# Patient Record
Sex: MALE | Race: BLACK OR AFRICAN AMERICAN | Employment: UNEMPLOYED | URBAN - METROPOLITAN AREA
[De-identification: names, ages, dates, MRNs, and addresses within clinical notes are randomized per-mention and may not be internally consistent; named-entity substitution may affect disease eponyms.]

---

## 2020-04-23 ENCOUNTER — APPOINTMENT (OUTPATIENT)
Dept: CT IMAGING | Age: 50
DRG: 045 | End: 2020-04-23
Attending: EMERGENCY MEDICINE
Payer: MEDICAID

## 2020-04-23 ENCOUNTER — HOSPITAL ENCOUNTER (INPATIENT)
Age: 50
LOS: 10 days | Discharge: HOME HEALTH | DRG: 045 | End: 2020-05-04
Attending: EMERGENCY MEDICINE | Admitting: INTERNAL MEDICINE
Payer: MEDICAID

## 2020-04-23 DIAGNOSIS — I63.339 CEREBROVASCULAR ACCIDENT (CVA) DUE TO THROMBOSIS OF POSTERIOR CEREBRAL ARTERY, UNSPECIFIED BLOOD VESSEL LATERALITY (HCC): ICD-10-CM

## 2020-04-23 DIAGNOSIS — R42 VERTIGO: Primary | ICD-10-CM

## 2020-04-23 DIAGNOSIS — R47.81 SLURRED SPEECH: ICD-10-CM

## 2020-04-23 DIAGNOSIS — F10.10 ALCOHOL ABUSE: ICD-10-CM

## 2020-04-23 DIAGNOSIS — Q21.12 PFO (PATENT FORAMEN OVALE): ICD-10-CM

## 2020-04-23 DIAGNOSIS — Z86.73 HISTORY OF TIAS: ICD-10-CM

## 2020-04-23 DIAGNOSIS — E78.00 HYPERCHOLESTEREMIA: ICD-10-CM

## 2020-04-23 DIAGNOSIS — R27.0 ATAXIA: ICD-10-CM

## 2020-04-23 DIAGNOSIS — M48.02 CERVICAL SPINAL STENOSIS: ICD-10-CM

## 2020-04-23 DIAGNOSIS — R29.898 BILATERAL LEG WEAKNESS: ICD-10-CM

## 2020-04-23 DIAGNOSIS — I65.23 BILATERAL CAROTID ARTERY STENOSIS: ICD-10-CM

## 2020-04-23 LAB
ALBUMIN SERPL-MCNC: 4.1 G/DL (ref 3.5–5)
ALBUMIN/GLOB SERPL: 1 {RATIO} (ref 1.1–2.2)
ALP SERPL-CCNC: 87 U/L (ref 45–117)
ALT SERPL-CCNC: 28 U/L (ref 12–78)
ANION GAP SERPL CALC-SCNC: 7 MMOL/L (ref 5–15)
AST SERPL-CCNC: 29 U/L (ref 15–37)
BASOPHILS # BLD: 0.1 K/UL (ref 0–0.1)
BASOPHILS NFR BLD: 2 % (ref 0–1)
BILIRUB SERPL-MCNC: 0.2 MG/DL (ref 0.2–1)
BUN SERPL-MCNC: 7 MG/DL (ref 6–20)
BUN/CREAT SERPL: 7 (ref 12–20)
CALCIUM SERPL-MCNC: 8.2 MG/DL (ref 8.5–10.1)
CHLORIDE SERPL-SCNC: 101 MMOL/L (ref 97–108)
CO2 SERPL-SCNC: 30 MMOL/L (ref 21–32)
CREAT SERPL-MCNC: 0.94 MG/DL (ref 0.7–1.3)
DIFFERENTIAL METHOD BLD: ABNORMAL
EOSINOPHIL # BLD: 0 K/UL (ref 0–0.4)
EOSINOPHIL NFR BLD: 0 % (ref 0–7)
ERYTHROCYTE [DISTWIDTH] IN BLOOD BY AUTOMATED COUNT: 14.2 % (ref 11.5–14.5)
ETHANOL SERPL-MCNC: 208 MG/DL
GLOBULIN SER CALC-MCNC: 4.1 G/DL (ref 2–4)
GLUCOSE BLD STRIP.AUTO-MCNC: 86 MG/DL (ref 65–100)
GLUCOSE SERPL-MCNC: 94 MG/DL (ref 65–100)
HCT VFR BLD AUTO: 47.1 % (ref 36.6–50.3)
HGB BLD-MCNC: 15.5 G/DL (ref 12.1–17)
IMM GRANULOCYTES # BLD AUTO: 0 K/UL (ref 0–0.04)
IMM GRANULOCYTES NFR BLD AUTO: 0 % (ref 0–0.5)
INR PPP: 1 (ref 0.9–1.1)
LYMPHOCYTES # BLD: 1.2 K/UL (ref 0.8–3.5)
LYMPHOCYTES NFR BLD: 31 % (ref 12–49)
MCH RBC QN AUTO: 30.6 PG (ref 26–34)
MCHC RBC AUTO-ENTMCNC: 32.9 G/DL (ref 30–36.5)
MCV RBC AUTO: 93.1 FL (ref 80–99)
MONOCYTES # BLD: 0.2 K/UL (ref 0–1)
MONOCYTES NFR BLD: 6 % (ref 5–13)
NEUTS SEG # BLD: 2.2 K/UL (ref 1.8–8)
NEUTS SEG NFR BLD: 61 % (ref 32–75)
NRBC # BLD: 0 K/UL (ref 0–0.01)
NRBC BLD-RTO: 0 PER 100 WBC
PLATELET # BLD AUTO: 268 K/UL (ref 150–400)
PMV BLD AUTO: 9.4 FL (ref 8.9–12.9)
POTASSIUM SERPL-SCNC: 4.2 MMOL/L (ref 3.5–5.1)
PROT SERPL-MCNC: 8.2 G/DL (ref 6.4–8.2)
PROTHROMBIN TIME: 10.2 SEC (ref 9–11.1)
RBC # BLD AUTO: 5.06 M/UL (ref 4.1–5.7)
SERVICE CMNT-IMP: NORMAL
SODIUM SERPL-SCNC: 138 MMOL/L (ref 136–145)
TROPONIN I SERPL-MCNC: <0.05 NG/ML
WBC # BLD AUTO: 3.7 K/UL (ref 4.1–11.1)

## 2020-04-23 PROCEDURE — 74011250636 HC RX REV CODE- 250/636: Performed by: EMERGENCY MEDICINE

## 2020-04-23 PROCEDURE — 74011000250 HC RX REV CODE- 250: Performed by: EMERGENCY MEDICINE

## 2020-04-23 PROCEDURE — 80307 DRUG TEST PRSMV CHEM ANLYZR: CPT

## 2020-04-23 PROCEDURE — 36415 COLL VENOUS BLD VENIPUNCTURE: CPT

## 2020-04-23 PROCEDURE — 80053 COMPREHEN METABOLIC PANEL: CPT

## 2020-04-23 PROCEDURE — 82962 GLUCOSE BLOOD TEST: CPT

## 2020-04-23 PROCEDURE — 85025 COMPLETE CBC W/AUTO DIFF WBC: CPT

## 2020-04-23 PROCEDURE — 70496 CT ANGIOGRAPHY HEAD: CPT

## 2020-04-23 PROCEDURE — 99285 EMERGENCY DEPT VISIT HI MDM: CPT

## 2020-04-23 PROCEDURE — 74011636320 HC RX REV CODE- 636/320: Performed by: EMERGENCY MEDICINE

## 2020-04-23 PROCEDURE — 99218 HC RM OBSERVATION: CPT

## 2020-04-23 PROCEDURE — 0042T CT CODE NEURO PERF W CBF: CPT

## 2020-04-23 PROCEDURE — 96374 THER/PROPH/DIAG INJ IV PUSH: CPT

## 2020-04-23 PROCEDURE — 70450 CT HEAD/BRAIN W/O DYE: CPT

## 2020-04-23 PROCEDURE — 85610 PROTHROMBIN TIME: CPT

## 2020-04-23 PROCEDURE — 84484 ASSAY OF TROPONIN QUANT: CPT

## 2020-04-23 PROCEDURE — 93005 ELECTROCARDIOGRAM TRACING: CPT

## 2020-04-23 RX ORDER — HEPARIN SODIUM 5000 [USP'U]/ML
5000 INJECTION, SOLUTION INTRAVENOUS; SUBCUTANEOUS EVERY 8 HOURS
Status: DISCONTINUED | OUTPATIENT
Start: 2020-04-23 | End: 2020-05-04 | Stop reason: HOSPADM

## 2020-04-23 RX ORDER — ACETAMINOPHEN 325 MG/1
650 TABLET ORAL
Status: DISCONTINUED | OUTPATIENT
Start: 2020-04-23 | End: 2020-04-24

## 2020-04-23 RX ORDER — DIAZEPAM 5 MG/1
10 TABLET ORAL ONCE
Status: ACTIVE | OUTPATIENT
Start: 2020-04-23 | End: 2020-04-24

## 2020-04-23 RX ORDER — DIAZEPAM 5 MG/1
10 TABLET ORAL
Status: DISCONTINUED | OUTPATIENT
Start: 2020-04-23 | End: 2020-04-24

## 2020-04-23 RX ORDER — ASPIRIN 325 MG/1
100 TABLET, FILM COATED ORAL DAILY
Status: DISCONTINUED | OUTPATIENT
Start: 2020-04-23 | End: 2020-04-24

## 2020-04-23 RX ORDER — ACETAMINOPHEN 325 MG/1
650 TABLET ORAL
Status: DISCONTINUED | OUTPATIENT
Start: 2020-04-23 | End: 2020-05-04 | Stop reason: HOSPADM

## 2020-04-23 RX ORDER — LABETALOL HYDROCHLORIDE 5 MG/ML
5 INJECTION, SOLUTION INTRAVENOUS
Status: DISCONTINUED | OUTPATIENT
Start: 2020-04-23 | End: 2020-04-28

## 2020-04-23 RX ORDER — DIAZEPAM 5 MG/1
20 TABLET ORAL ONCE
Status: DISCONTINUED | OUTPATIENT
Start: 2020-04-23 | End: 2020-04-23

## 2020-04-23 RX ORDER — ONDANSETRON 2 MG/ML
4 INJECTION INTRAMUSCULAR; INTRAVENOUS
Status: DISCONTINUED | OUTPATIENT
Start: 2020-04-23 | End: 2020-04-28

## 2020-04-23 RX ORDER — SODIUM CHLORIDE 9 MG/ML
125 INJECTION, SOLUTION INTRAVENOUS CONTINUOUS
Status: DISCONTINUED | OUTPATIENT
Start: 2020-04-23 | End: 2020-04-23

## 2020-04-23 RX ORDER — GUAIFENESIN 100 MG/5ML
81 LIQUID (ML) ORAL DAILY
Status: DISCONTINUED | OUTPATIENT
Start: 2020-04-24 | End: 2020-04-25

## 2020-04-23 RX ORDER — SODIUM CHLORIDE 0.9 % (FLUSH) 0.9 %
10 SYRINGE (ML) INJECTION
Status: COMPLETED | OUTPATIENT
Start: 2020-04-23 | End: 2020-04-23

## 2020-04-23 RX ORDER — ACETAMINOPHEN 650 MG/1
650 SUPPOSITORY RECTAL
Status: DISCONTINUED | OUTPATIENT
Start: 2020-04-23 | End: 2020-04-24

## 2020-04-23 RX ORDER — SODIUM CHLORIDE 9 MG/ML
75 INJECTION, SOLUTION INTRAVENOUS CONTINUOUS
Status: DISCONTINUED | OUTPATIENT
Start: 2020-04-23 | End: 2020-04-23

## 2020-04-23 RX ORDER — DIAZEPAM 5 MG/1
20 TABLET ORAL
Status: DISCONTINUED | OUTPATIENT
Start: 2020-04-23 | End: 2020-04-24

## 2020-04-23 RX ADMIN — Medication 10 ML: at 13:09

## 2020-04-23 RX ADMIN — IOPAMIDOL 100 ML: 755 INJECTION, SOLUTION INTRAVENOUS at 13:09

## 2020-04-23 RX ADMIN — FOLIC ACID: 5 INJECTION, SOLUTION INTRAMUSCULAR; INTRAVENOUS; SUBCUTANEOUS at 16:16

## 2020-04-23 NOTE — ED NOTES
TRANSFER - OUT REPORT:    Verbal report given to Adriana3(name) on Lillie Snell  being transferred to neuro(unit) for routine progression of care       Report consisted of patients Situation, Background, Assessment and   Recommendations(SBAR). Information from the following report(s) SBAR and ED Summary was reviewed with the receiving nurse. Lines:   Peripheral IV 04/23/20 Right Antecubital (Active)   Site Assessment Clean, dry, & intact 4/23/2020 12:59 PM   Phlebitis Assessment 0 4/23/2020 12:59 PM   Infiltration Assessment 0 4/23/2020 12:59 PM        Opportunity for questions and clarification was provided.       Patient transported with:   transport

## 2020-04-23 NOTE — ED PROVIDER NOTES
EMERGENCY DEPARTMENT HISTORY AND PHYSICAL EXAM      Date: 4/23/2020  Patient Name: Marielena Crabtree  Patient Age and Sex: 47 y.o. male     History of Presenting Illness     Chief Complaint   Patient presents with    Altered mental status     , patient states previous hx stroke in Jan 2020    Headache    Alcohol Problem     patient states has been drinking for few days    Fatigue     bilateral general weakness, moving all extremities       History Provided By: Patient, ems    HPI: Marielena Crabtree is a 53-year-old male presenting for headache and dizziness. Patient states that he does have a history of high blood pressure and a history of stroke back in January. Yesterday went to bed without any symptoms and then woke up this morning and felt dizzy like the room was spinning as well as unstable. Also states that he feels like his legs bilaterally are weak. When asked if he has been drinking, patient said to me that his last drink was about 3 days ago. Patient also states that he has been having a headache. EMS reported a high blood pressure in route but a normal glucose. When they did the [place] scale, it was negative. Patient did tell nurse that that he has been drinking for the past couple of days and yesterday had a 40 ounce beer. His last drink was yesterday. There are no other complaints, changes, or physical findings at this time. PCP: None    No current facility-administered medications on file prior to encounter. No current outpatient medications on file prior to encounter. Past History     Past Medical History:  No past medical history on file. Past Surgical History:  No past surgical history on file. Family History:  No family history on file.     Social History:  Social History     Tobacco Use    Smoking status: Never Smoker   Substance Use Topics    Alcohol use: Yes     Comment: social drinker    Drug use: No       Allergies:  No Known Allergies      Review of Systems   Review of Systems   Constitutional: Negative for chills and fever. Respiratory: Negative for cough and shortness of breath. Cardiovascular: Negative for chest pain. Gastrointestinal: Negative for abdominal pain, constipation, diarrhea, nausea and vomiting. Genitourinary: Negative for dysuria, frequency and hematuria. Neurological: Positive for dizziness, weakness and headaches. Negative for numbness. All other systems reviewed and are negative. Physical Exam   Physical Exam  Vitals signs and nursing note reviewed. Constitutional:       Appearance: He is well-developed. HENT:      Head: Normocephalic and atraumatic. Eyes:      Conjunctiva/sclera: Conjunctivae normal.   Neck:      Musculoskeletal: Normal range of motion and neck supple. Cardiovascular:      Rate and Rhythm: Normal rate and regular rhythm. Pulmonary:      Effort: Pulmonary effort is normal. No respiratory distress. Breath sounds: Normal breath sounds. Abdominal:      General: There is no distension. Palpations: Abdomen is soft. Tenderness: There is no abdominal tenderness. Musculoskeletal: Normal range of motion. Skin:     General: Skin is warm and dry. Neurological:      General: No focal deficit present. Mental Status: He is alert and oriented to person, place, and time. Mental status is at baseline. Comments: Patient is alert and oriented. No cranial nerve deficits. 5-5 strength in the upper extremities, no pronator drift of the arms. With his legs, has 4-5 strength in his bilateral lower extremities with slightly worsened on the right.   No tremors noted   Psychiatric:         Mood and Affect: Mood normal.          Diagnostic Study Results     Labs -     Recent Results (from the past 12 hour(s))   CBC WITH AUTOMATED DIFF    Collection Time: 04/23/20  1:26 PM   Result Value Ref Range    WBC 3.7 (L) 4.1 - 11.1 K/uL    RBC 5.06 4.10 - 5.70 M/uL    HGB 15.5 12.1 - 17.0 g/dL HCT 47.1 36.6 - 50.3 %    MCV 93.1 80.0 - 99.0 FL    MCH 30.6 26.0 - 34.0 PG    MCHC 32.9 30.0 - 36.5 g/dL    RDW 14.2 11.5 - 14.5 %    PLATELET 726 553 - 558 K/uL    MPV 9.4 8.9 - 12.9 FL    NRBC 0.0 0  WBC    ABSOLUTE NRBC 0.00 0.00 - 0.01 K/uL    NEUTROPHILS 61 32 - 75 %    LYMPHOCYTES 31 12 - 49 %    MONOCYTES 6 5 - 13 %    EOSINOPHILS 0 0 - 7 %    BASOPHILS 2 (H) 0 - 1 %    IMMATURE GRANULOCYTES 0 0.0 - 0.5 %    ABS. NEUTROPHILS 2.2 1.8 - 8.0 K/UL    ABS. LYMPHOCYTES 1.2 0.8 - 3.5 K/UL    ABS. MONOCYTES 0.2 0.0 - 1.0 K/UL    ABS. EOSINOPHILS 0.0 0.0 - 0.4 K/UL    ABS. BASOPHILS 0.1 0.0 - 0.1 K/UL    ABS. IMM. GRANS. 0.0 0.00 - 0.04 K/UL    DF AUTOMATED     METABOLIC PANEL, COMPREHENSIVE    Collection Time: 04/23/20  1:26 PM   Result Value Ref Range    Sodium 138 136 - 145 mmol/L    Potassium 4.2 3.5 - 5.1 mmol/L    Chloride 101 97 - 108 mmol/L    CO2 30 21 - 32 mmol/L    Anion gap 7 5 - 15 mmol/L    Glucose 94 65 - 100 mg/dL    BUN 7 6 - 20 MG/DL    Creatinine 0.94 0.70 - 1.30 MG/DL    BUN/Creatinine ratio 7 (L) 12 - 20      GFR est AA >60 >60 ml/min/1.73m2    GFR est non-AA >60 >60 ml/min/1.73m2    Calcium 8.2 (L) 8.5 - 10.1 MG/DL    Bilirubin, total 0.2 0.2 - 1.0 MG/DL    ALT (SGPT) 28 12 - 78 U/L    AST (SGOT) 29 15 - 37 U/L    Alk. phosphatase 87 45 - 117 U/L    Protein, total 8.2 6.4 - 8.2 g/dL    Albumin 4.1 3.5 - 5.0 g/dL    Globulin 4.1 (H) 2.0 - 4.0 g/dL    A-G Ratio 1.0 (L) 1.1 - 2.2     TROPONIN I    Collection Time: 04/23/20  1:26 PM   Result Value Ref Range    Troponin-I, Qt. <0.05 <0.05 ng/mL   ETHYL ALCOHOL    Collection Time: 04/23/20  1:26 PM   Result Value Ref Range    ALCOHOL(ETHYL),SERUM 208 (H) <10 MG/DL   GLUCOSE, POC    Collection Time: 04/23/20  1:34 PM   Result Value Ref Range    Glucose (POC) 86 65 - 100 mg/dL    Performed by Amber Ware        Radiologic Studies -   CTA CODE NEURO HEAD AND NECK W CONT   Final Result   Impression:      1. No large vessel occlusion.  No significant vascular disease. 2.  . Right frontal developmental venous anomaly. 3.  Incidental findings as above      CT CODE NEURO PERF W CBF   Final Result   Impression:      1. No large vessel occlusion. No significant vascular disease. 2.  . Right frontal developmental venous anomaly. 3.  Incidental findings as above      CT CODE NEURO HEAD WO CONTRAST   Final Result   IMPRESSION:      No evidence for acute intracranial abnormality         Findings were discussed with the ED        CT Results  (Last 48 hours)               04/23/20 1308  CTA CODE NEURO HEAD AND NECK W CONT Final result    Impression:  Impression:       1. No large vessel occlusion. No significant vascular disease. 2.  . Right frontal developmental venous anomaly. 3.  Incidental findings as above       Narrative:  INDICATION: Code Stroke       EXAMINATION:  CT ANGIOGRAPHY HEAD AND NECK        COMPARISON: None        TECHNIQUE:  Following the uneventful administration of iodinated contrast   material, axial CT angiography of the head and neck was performed. Delayed axial   images through the head were also obtained. Coronal and sagittal reconstructions   were obtained. Manual postprocessing of images was performed. 3-D  Sagittal   maximal intensity projection images were obtained. 3-D Coronal maximal   intensity projections were obtained. CT dose reduction was achieved through use   of a standardized protocol tailored for this examination and automatic exposure   control for dose modulation. CT brain perfusion was performed with generation of hemodynamic maps of multiple   parameters, including cerebral blood flow, cerebral blood volume, and MTT (mean   transit time). FINDINGS:       CTA NECK:       Aortic Arch: No significant abnormality. Right Common Carotid Artery: No significant abnormality. Right Internal Carotid Artery: No significant abnormality. NASCET Right: 0-25%.    Left Common Carotid Artery: No significant abnormality. Left Internal Carotid Artery: No significant abnormality. NASCET Left: 0-25%. .   Carotid stenosis determined using NASCET criteria. Right Vertebral Artery: No significant abnormality. Left Vertebral Artery: No significant abnormality. Cervical Soft Tissues: Mucosal thickening in the bilateral maxillary sinuses. .   Lung Apices: No significant abnormality. Bones: Degenerative changes in cervical spine. .   Additional Comments: N/A.       CTA HEAD:       Posterior Circulation: Bilateral posterior communicating arteries are noted. Sivakumar Larios Anterior Circulation: No flow limiting stenosis or occlusion. Additional Comments: Incidental note is made of a probable developmental venous   anomaly in the right frontal lobe. .       There are no regional areas of elevated Tmax, decreased cerebral blood flow or   blood volume. rCBF < 30% = 0 cc. Tmax > 6 seconds = 0 cc.           04/23/20 1308  CT CODE NEURO PERF W CBF Final result    Impression:  Impression:       1. No large vessel occlusion. No significant vascular disease. 2.  . Right frontal developmental venous anomaly. 3.  Incidental findings as above       Narrative:  INDICATION: Code Stroke       EXAMINATION:  CT ANGIOGRAPHY HEAD AND NECK        COMPARISON: None        TECHNIQUE:  Following the uneventful administration of iodinated contrast   material, axial CT angiography of the head and neck was performed. Delayed axial   images through the head were also obtained. Coronal and sagittal reconstructions   were obtained. Manual postprocessing of images was performed. 3-D  Sagittal   maximal intensity projection images were obtained. 3-D Coronal maximal   intensity projections were obtained. CT dose reduction was achieved through use   of a standardized protocol tailored for this examination and automatic exposure   control for dose modulation.        CT brain perfusion was performed with generation of hemodynamic maps of multiple   parameters, including cerebral blood flow, cerebral blood volume, and MTT (mean   transit time). FINDINGS:       CTA NECK:       Aortic Arch: No significant abnormality. Right Common Carotid Artery: No significant abnormality. Right Internal Carotid Artery: No significant abnormality. NASCET Right: 0-25%. Left Common Carotid Artery: No significant abnormality. Left Internal Carotid Artery: No significant abnormality. NASCET Left: 0-25%. .   Carotid stenosis determined using NASCET criteria. Right Vertebral Artery: No significant abnormality. Left Vertebral Artery: No significant abnormality. Cervical Soft Tissues: Mucosal thickening in the bilateral maxillary sinuses. .   Lung Apices: No significant abnormality. Bones: Degenerative changes in cervical spine. .   Additional Comments: N/A.       CTA HEAD:       Posterior Circulation: Bilateral posterior communicating arteries are noted. Cherl Spinner Anterior Circulation: No flow limiting stenosis or occlusion. Additional Comments: Incidental note is made of a probable developmental venous   anomaly in the right frontal lobe. .       There are no regional areas of elevated Tmax, decreased cerebral blood flow or   blood volume. rCBF < 30% = 0 cc. Tmax > 6 seconds = 0 cc.           04/23/20 1254  CT CODE NEURO HEAD WO CONTRAST Final result    Impression:  IMPRESSION:       No evidence for acute intracranial abnormality           Findings were discussed with the ED       Narrative:  INDICATION:   Generalized weakness, fatigue        EXAM:  HEAD CT WITHOUT CONTRAST       COMPARISON:  None       TECHNIQUE:  Routine noncontrast axial head CT was performed. Coronal and   sagittal multiplanar reconstructions were obtained. CT dose reduction was   achieved through use of a standardized protocol tailored for this examination   and automatic exposure control for dose modulation.         FINDINGS:       The ventricles and cortical sulci are within normal limits. No evidence for acute intracranial hemorrhage, midline shift, or mass effect. Mild bilateral subcortical and periventricular areas of patchy low attenuation   is nonspecific but likely related to changes of chronic small vessel ischemic   disease. The basal cisterns are patent. Mild mucosal thickening in the maxillary sinuses       No calvarial abnormality. CXR Results  (Last 48 hours)    None            Medical Decision Making   I am the first provider for this patient. I reviewed the vital signs, available nursing notes, past medical history, past surgical history, family history and social history. Vital Signs-Reviewed the patient's vital signs. Patient Vitals for the past 12 hrs:   Temp Pulse Resp BP SpO2   04/23/20 1320 98.4 °F (36.9 °C) 94 19 (!) 144/110 100 %       Records Reviewed: Nursing Notes and Old Medical Records    Provider Notes (Medical Decision Making):   Patient with a history of headache and stroke in the past presenting for dizziness described as room spinning and vertigo. Given his risk factors, will do CT and CTA to look for any signs of posterior stroke. Will get labs and alcohol level on him as well to make sure no electrolyte abnormalities or signs of intoxication causing the symptoms. Differential could also be due to malnourishment, Warnicke's encephalopathy, B vitamin deficiency or folic acid deficiency. ED Course:   Initial assessment performed. The patients presenting problems have been discussed, and they are in agreement with the care plan formulated and outlined with them. I have encouraged them to ask questions as they arise throughout their visit. ED Course as of Apr 23 1422   Thu Apr 23, 2020   1256 Received call from Dr. Maria Esther Agosto, Tele-Neurology, who agrees with getting CT and CTA on patient and she will call back with the results and will see patient.     [JS]   [id number] with Dr. Maria Esther Agosto who states that given his history of recent stroke this could potentially be a small stroke versus alcohol withdrawal.  Would recommend admission and agrees with banana bag. [JS]   0872 CIWA score 4    [JS]      ED Course User Index  [JS] Garrett Haddad MD     Critical Care Time:   0    Disposition:    Admission Note:  Patient is being admitted to the hospital by Dr. Shari Shah, Service: Hospitalist.  The results of their tests and reasons for their admission have been discussed with them and available family. They convey agreement and understanding for the need to be admitted and for their admission diagnosis. Diagnosis     Clinical Impression:   1. Vertigo    2. Slurred speech    3. Alcohol abuse        Attestations:    Curry Llamas M.D. Please note that this dictation was completed with [employer], the computer voice recognition software. Quite often unanticipated grammatical, syntax, homophones, and other interpretive errors are inadvertently transcribed by the computer software. Please disregard these errors. Please excuse any errors that have escaped final proofreading. Thank you.

## 2020-04-23 NOTE — H&P
Hospitalist Admission Note    NAME:  So Carmen   :   1970   MRN:   [id number]     Date of admit: 2020    PCP: None    Assessment/Plan:     Possible stroke versus peripheral vertigo POA  Ataxia and vertigo x1 day POA  Reports history of stroke in [place] 2020 with similar symptoms  Denies taking aspirin or blood pressure medicines at present time  1 day of feeling like he is staggering when he walks(ETOH 208), intermittent vertigo  Bilateral leg weakness, but no right or left-sided weakness, no visual changes or speech changes  Emergency room head CT scan and CTA negative  Tele neurology recommended admission for MRI and evaluation  Telemetry to rule out atrial fibrillation  Permissive hypertension, PRN labetalol  Start aspirin  Start Lipitor  Check MRI of the brain check   Echocardiogram  Neurology consult  Dysphagia evaluation before diet, speech consult  PT and OT consults    Essential hypertension POA  Not currently on therapy, elevated at admission 144/110  PRN  labetalol    Alcohol abuse POA  Patient states he is not \"not heavy\", said last drink 3 days ago  Alcohol level 208 in ED, then admitted to drinking a 40 ounce beer  P.o. thiamine  CIWA scale    Right frontal developmental venous anomaly noted on CTA  Unclear significance, suspect nothing to do  Neurology input    Homelessness  As case management for shelter referrals    Very poor dentition  Multiple caries and missing teeth, discussed with patient getting dental care     Body mass index is 23.79 kg/m². Given the patient's current clinical presentation, I have a high level of concern for decompensation if discharged from the emergency department. My assessment of this patient's clinical condition and my plan of care is as noted above.     DVT prophylaxis with heparin SQ    Code status: Full code  NOK: brother Tona Bucio    History     CHIEF COMPLAINT: I felt like I was staggering today when I walked    HISTORY OF PRESENT ILLNESS:    70-year-old -American male    Originally from [place], currently is homeless    Says he had a stroke in [facility] in January 2020  Presented with similar symptoms to today  Says he is not currently taking an aspirin or any prescription medications    Awoke this morning suddenly felt unsteady when he walked \"like I was staggering\"  Initially denied drinking but had an alcohol level of 208 in the emergency room  No headache  No speech changes or visual changes  No focal weakness but did admit to bilateral leg weakness  No sensory changes  No neck or back pain  No fevers chills, cough, shortness of breath, chest pain, nausea vomiting, diarrhea, abdominal pain    Called EMS because of the unsteady gait    Emergency room neuro exam nonfocal  Hemodynamically stable  Head CT scan negative  CTA head and neck with no large vessel occlusion and no significant vascular disease   Right frontal developmental venous anomaly  Tele neurology consulted, recommended admission for MRI and further evaluation  We will call    Past medical history:  1. Possible stroke January 2020 in [facility]  2. Essential hypertension  3.   Alcohol use      Social History     Tobacco Use    Smoking status: Never Smoker   Substance Use Topics    Alcohol use: Yes     Comment: social drinker   Says \"not a heavy drinker\" not daily initially said last drink was 3 days ago  We discussed alcohol level he corrected that he did have a 40 ounce beer the night before admission  Currently homeless    Family history:  No children of own  No family history of stroke    No Known Allergies     Prior to Admission medications       Takes no prescription medications    Review of symptoms:     POSITIVE= Bold  Negative = not bold  General:  fever, chills, sweats,  Eyes:    blurred vision, eye pain, double vision  ENT:    Coryza, sore throat, trouble swallowing  Respiratory:   cough, sputum, SOB  Cardiology:   chest pain, orthopnea, PND, edema  Gastrointestinal:  abdominal pain , N/V, diarrhea, constipation, melena or BRBPR  Genitourinary:  Urgency, dysuria, hematuria  Muskuloskeletal :  Joint redness, swelling or acute joint pain, myalgias  Hematology:  easy bruising, nose or gum bleeding  Dermatological: rash, ulceration  Endocrine:   Polyuria or polydipsia, heat or hold intolerance  Neurological:  Headache, bilateral leg weakness     Speech difficulties, memory loss, unsteady gait     Vertigo  Psychological: depression, agitation      Objective:   VITALS:    Patient Vitals for the past 24 hrs:   Temp Pulse Resp BP SpO2   20 2352 98.7 °F (37.1 °C) 73 16 115/83 97 %   20 2019 98.7 °F (37.1 °C) 83 16 157/90 96 %   20 1855 99.3 °F (37.4 °C) 97 16 167/82 96 %   20 1745  (!) 103 14 (!) 148/93 94 %   20 1320 98.4 °F (36.9 °C) 94 19 (!) 144/110 100 %     Temp (24hrs), Av.4 °F (36.9 °C), Min:98.4 °F (36.9 °C), Max:98.4 °F (36.9 °C)      O2 Device: Room air    Wt Readings from Last 12 Encounters:   20 77.4 kg (170 lb 9 oz)   05/13/10 98.9 kg (218 lb 0.6 oz)         PHYSICAL EXAM:   General:    Alert, cooperative in no distress     HEENT: Normocephalic, atraumatic    PERRL,  Sclera no icterus    Nasal mucosa without masses or discharge  Hearing intact to voice    Oropharynx without erythema or exudate  Very poor dentition with missing teeth and caries  Neck:  No meningismus, trachea midline, no carotid bruits     Thyroid not enlarged, no nodules or tenderness  Lungs:   Clear to auscultation bilaterally. No wheezing or rales    No accessory muscle use or retractions. Heart:   Regular rate and rhythm,  no murmur or gallop. No LE edema  Abdomen:   Soft, non-tender. Not distended. Bowel sounds normal.     No masses, No Hepatosplenomegaly, No Rebound or guarding  Lymph nodes: No cervical or inguinal UMANG  Musculoskeletal:  No Joint swelling, erythema, warmth.  No Cyanosis or clubbing  Skin:      No rashes     Not Jaundiced   No nodules or thickening  Neurologic: Alert and oriented X 3, follows commands     Cranial nerves 2 to 12 intact, smile symmetric    Symmetric motor strength bilaterally, no pronator drift    F to N without dysmetria       LAB DATA REVIEWED:    Recent Results (from the past 12 hour(s))   CBC WITH AUTOMATED DIFF    Collection Time: 04/23/20  1:26 PM   Result Value Ref Range    WBC 3.7 (L) 4.1 - 11.1 K/uL    RBC 5.06 4.10 - 5.70 M/uL    HGB 15.5 12.1 - 17.0 g/dL    HCT 47.1 36.6 - 50.3 %    MCV 93.1 80.0 - 99.0 FL    MCH 30.6 26.0 - 34.0 PG    MCHC 32.9 30.0 - 36.5 g/dL    RDW 14.2 11.5 - 14.5 %    PLATELET 629 841 - 971 K/uL    MPV 9.4 8.9 - 12.9 FL    NRBC 0.0 0  WBC    ABSOLUTE NRBC 0.00 0.00 - 0.01 K/uL    NEUTROPHILS 61 32 - 75 %    LYMPHOCYTES 31 12 - 49 %    MONOCYTES 6 5 - 13 %    EOSINOPHILS 0 0 - 7 %    BASOPHILS 2 (H) 0 - 1 %    IMMATURE GRANULOCYTES 0 0.0 - 0.5 %    ABS. NEUTROPHILS 2.2 1.8 - 8.0 K/UL    ABS. LYMPHOCYTES 1.2 0.8 - 3.5 K/UL    ABS. MONOCYTES 0.2 0.0 - 1.0 K/UL    ABS. EOSINOPHILS 0.0 0.0 - 0.4 K/UL    ABS. BASOPHILS 0.1 0.0 - 0.1 K/UL    ABS. IMM. GRANS. 0.0 0.00 - 0.04 K/UL    DF AUTOMATED     METABOLIC PANEL, COMPREHENSIVE    Collection Time: 04/23/20  1:26 PM   Result Value Ref Range    Sodium 138 136 - 145 mmol/L    Potassium 4.2 3.5 - 5.1 mmol/L    Chloride 101 97 - 108 mmol/L    CO2 30 21 - 32 mmol/L    Anion gap 7 5 - 15 mmol/L    Glucose 94 65 - 100 mg/dL    BUN 7 6 - 20 MG/DL    Creatinine 0.94 0.70 - 1.30 MG/DL    BUN/Creatinine ratio 7 (L) 12 - 20      GFR est AA >60 >60 ml/min/1.73m2    GFR est non-AA >60 >60 ml/min/1.73m2    Calcium 8.2 (L) 8.5 - 10.1 MG/DL    Bilirubin, total 0.2 0.2 - 1.0 MG/DL    ALT (SGPT) 28 12 - 78 U/L    AST (SGOT) 29 15 - 37 U/L    Alk.  phosphatase 87 45 - 117 U/L    Protein, total 8.2 6.4 - 8.2 g/dL    Albumin 4.1 3.5 - 5.0 g/dL    Globulin 4.1 (H) 2.0 - 4.0 g/dL    A-G Ratio 1.0 (L) 1.1 - 2.2     TROPONIN I Collection Time: 04/23/20  1:26 PM   Result Value Ref Range    Troponin-I, Qt. <0.05 <0.05 ng/mL   ETHYL ALCOHOL    Collection Time: 04/23/20  1:26 PM   Result Value Ref Range    ALCOHOL(ETHYL),SERUM 208 (H) <10 MG/DL   GLUCOSE, POC    Collection Time: 04/23/20  1:34 PM   Result Value Ref Range    Glucose (POC) 86 65 - 100 mg/dL    Performed by Marcelo Gregg        EKG as read by me shows normal sinus rhythm rate 90 normal axis, no hypertrophy,   no ST-T wave changes, normal intervals    CT scan head read by radiology FINDINGS:  The ventricles and cortical sulci are within normal limits. No evidence for acute intracranial hemorrhage, midline shift, or mass effect. Mild bilateral subcortical and periventricular areas of patchy low attenuation  is nonspecific but likely related to changes of chronic small vessel ischemic  disease. The basal cisterns are patent. Mild mucosal thickening in the maxillary sinuses  No calvarial abnormality. IMPRESSION:  No evidence for acute intracranial abnormality    CTA head and neck read by radiology FINDINGS:  CTA NECK:  Aortic Arch: No significant abnormality. Right Common Carotid Artery: No significant abnormality. Right Internal Carotid Artery: No significant abnormality. NASCET Right: 0-25%. Left Common Carotid Artery: No significant abnormality. Left Internal Carotid Artery: No significant abnormality. NASCET Left: 0-25%. .  Carotid stenosis determined using NASCET criteria. Right Vertebral Artery: No significant abnormality. Left Vertebral Artery: No significant abnormality. Cervical Soft Tissues: Mucosal thickening in the bilateral maxillary sinuses. .  Lung Apices: No significant abnormality. Bones: Degenerative changes in cervical spine. .  Additional Comments: N/A.  CTA HEAD:  Posterior Circulation: Bilateral posterior communicating arteries are noted. Blanchie Bevels Anterior Circulation: No flow limiting stenosis or occlusion.   Additional Comments: Incidental note is made of a probable developmental venous  anomaly in the right frontal lobe. .  There are no regional areas of elevated Tmax, decreased cerebral blood flow or  blood volume. rCBF < 30% = 0 cc. Tmax > 6 seconds = 0 cc. Impression:  1. No large vessel occlusion. No significant vascular disease.     2. . Right frontal developmental venous anomaly.     3. Incidental findings as above      I saw the patient personally, took a history and did a complete physical exam at the bedside. I performed complex decision making in coming up with a diagnostic and treatment plan for the patient. I reviewed the patient's past medical records, current laboratory and radiology results, and actual Xray films/EKG. I have also discussed this case with the involved ED physician.     Care Plan discussed with:    Patient,  ED Doc    Risk of deterioration:  High    Total Time Coordinating Admission: 60    minutes    Total Critical Care Time:         Joe Ag MD

## 2020-04-23 NOTE — PROGRESS NOTES
Please fill out MRI screening sheet and fax to MRI department once completed and signed.  Augustine Gabriel 12

## 2020-04-23 NOTE — PROGRESS NOTES
Nurse Clarification of the Prior to Admission Medication Regimen     The patient was interviewed regarding clarification of the prior to admission medication regimen. The individual(s) listed above were questioned regarding the patient's use of any other inhalers, topical products, over the counter medications, herbal medications, vitamin products or ophthalmic/nasal/otic medication use. Information Obtained From: Patient    Recommendations/Findings:    The following amendments were made to the patient's active medication list on file at [facility]:     1) Additions:    None    2) Removals:    None    3) Changes:   None       PTA medication list was corrected to the following:     None        Thank you,  Pa Chaudhry RN

## 2020-04-24 ENCOUNTER — APPOINTMENT (OUTPATIENT)
Dept: MRI IMAGING | Age: 50
DRG: 045 | End: 2020-04-24
Attending: INTERNAL MEDICINE
Payer: MEDICAID

## 2020-04-24 ENCOUNTER — APPOINTMENT (OUTPATIENT)
Dept: NON INVASIVE DIAGNOSTICS | Age: 50
DRG: 045 | End: 2020-04-24
Attending: INTERNAL MEDICINE
Payer: MEDICAID

## 2020-04-24 ENCOUNTER — APPOINTMENT (OUTPATIENT)
Dept: VASCULAR SURGERY | Age: 50
DRG: 045 | End: 2020-04-24
Attending: PSYCHIATRY & NEUROLOGY
Payer: MEDICAID

## 2020-04-24 PROBLEM — E78.00 HYPERCHOLESTEREMIA: Status: ACTIVE | Noted: 2020-04-24

## 2020-04-24 PROBLEM — F10.10 ALCOHOL ABUSE: Status: ACTIVE | Noted: 2020-04-24

## 2020-04-24 PROBLEM — R27.0 ATAXIA: Status: ACTIVE | Noted: 2020-04-24

## 2020-04-24 PROBLEM — I10 HTN (HYPERTENSION): Status: ACTIVE | Noted: 2020-04-24

## 2020-04-24 PROBLEM — Z86.73 HX-TIA (TRANSIENT ISCHEMIC ATTACK): Status: ACTIVE | Noted: 2020-04-24

## 2020-04-24 PROBLEM — I65.23 BILATERAL CAROTID ARTERY STENOSIS: Status: ACTIVE | Noted: 2020-04-24

## 2020-04-24 LAB
25(OH)D3 SERPL-MCNC: <9 NG/ML (ref 30–100)
APPEARANCE UR: CLEAR
ATRIAL RATE: 90 BPM
BACTERIA URNS QL MICRO: ABNORMAL /HPF
BILIRUB UR QL CFM: NEGATIVE
CALCULATED P AXIS, ECG09: 53 DEGREES
CALCULATED R AXIS, ECG10: 66 DEGREES
CALCULATED T AXIS, ECG11: 73 DEGREES
CHOLEST SERPL-MCNC: 204 MG/DL
CK SERPL-CCNC: 140 U/L (ref 39–308)
COLOR UR: ABNORMAL
DIAGNOSIS, 93000: NORMAL
ECHO AO ROOT DIAM: 3.14 CM
ECHO LV E' LATERAL VELOCITY: 14.72 CM/S
ECHO LV E' SEPTAL VELOCITY: 10.96 CM/S
ECHO RA AREA 4C: 17.43 CM2
EPITH CASTS URNS QL MICRO: ABNORMAL /LPF
ERYTHROCYTE [SEDIMENTATION RATE] IN BLOOD: 3 MM/HR (ref 0–20)
EST. AVERAGE GLUCOSE BLD GHB EST-MCNC: 120 MG/DL
FOLATE SERPL-MCNC: 15.1 NG/ML (ref 5–21)
GLUCOSE UR STRIP.AUTO-MCNC: NEGATIVE MG/DL
HBA1C MFR BLD: 5.8 % (ref 4–5.6)
HDLC SERPL-MCNC: 109 MG/DL
HDLC SERPL: 1.9 {RATIO} (ref 0–5)
HEMOCCULT STL QL: NEGATIVE
HGB UR QL STRIP: NEGATIVE
HYALINE CASTS URNS QL MICRO: ABNORMAL /LPF (ref 0–5)
KETONES UR QL STRIP.AUTO: ABNORMAL MG/DL
LDLC SERPL CALC-MCNC: 83.4 MG/DL (ref 0–100)
LEFT CCA DIST DIAS: 25.6 CM/S
LEFT CCA DIST SYS: 90.4 CM/S
LEFT CCA PROX DIAS: 25.6 CM/S
LEFT CCA PROX SYS: 131.8 CM/S
LEFT ECA DIAS: 15.3 CM/S
LEFT ECA SYS: 61.9 CM/S
LEFT ICA DIST DIAS: 22.4 CM/S
LEFT ICA DIST SYS: 69.8 CM/S
LEFT ICA MID DIAS: 25.6 CM/S
LEFT ICA MID SYS: 69.7 CM/S
LEFT ICA PROX DIAS: 15.3 CM/S
LEFT ICA PROX SYS: 82.6 CM/S
LEFT ICA/CCA SYS: 0.9
LEFT SUBCLAVIAN DIAS: 20.5 CM/S
LEFT SUBCLAVIAN SYS: 186.2 CM/S
LEFT VERTEBRAL DIAS: 12.7 CM/S
LEFT VERTEBRAL SYS: 51.5 CM/S
LEUKOCYTE ESTERASE UR QL STRIP.AUTO: NEGATIVE
LIPID PROFILE,FLP: ABNORMAL
NITRITE UR QL STRIP.AUTO: NEGATIVE
P-R INTERVAL, ECG05: 144 MS
PH UR STRIP: 8 [PH] (ref 5–8)
PROT UR STRIP-MCNC: 30 MG/DL
Q-T INTERVAL, ECG07: 376 MS
QRS DURATION, ECG06: 98 MS
QTC CALCULATION (BEZET), ECG08: 459 MS
RBC #/AREA URNS HPF: ABNORMAL /HPF (ref 0–5)
RIGHT CCA DIST DIAS: 21.7 CM/S
RIGHT CCA DIST SYS: 87.6 CM/S
RIGHT CCA PROX DIAS: 10.8 CM/S
RIGHT CCA PROX SYS: 81 CM/S
RIGHT ECA DIAS: 15.1 CM/S
RIGHT ECA SYS: 85.4 CM/S
RIGHT ICA DIST DIAS: 17.3 CM/S
RIGHT ICA DIST SYS: 57.4 CM/S
RIGHT ICA MID DIAS: 12.9 CM/S
RIGHT ICA MID SYS: 59 CM/S
RIGHT ICA PROX DIAS: 12.9 CM/S
RIGHT ICA PROX SYS: 43.7 CM/S
RIGHT ICA/CCA SYS: 0.7
RIGHT SUBCLAVIAN DIAS: 0 CM/S
RIGHT SUBCLAVIAN SYS: 113.9 CM/S
RIGHT VERTEBRAL DIAS: 10.8 CM/S
RIGHT VERTEBRAL SYS: 43.7 CM/S
SP GR UR REFRACTOMETRY: 1.03 (ref 1–1.03)
TRIGL SERPL-MCNC: 58 MG/DL (ref ?–150)
TSH SERPL DL<=0.05 MIU/L-ACNC: 3.69 UIU/ML (ref 0.36–3.74)
UROBILINOGEN UR QL STRIP.AUTO: 1 EU/DL (ref 0.2–1)
VENTRICULAR RATE, ECG03: 90 BPM
VIT B12 SERPL-MCNC: 568 PG/ML (ref 193–986)
VLDLC SERPL CALC-MCNC: 11.6 MG/DL
WBC URNS QL MICRO: ABNORMAL /HPF (ref 0–4)

## 2020-04-24 PROCEDURE — 74011250637 HC RX REV CODE- 250/637: Performed by: INTERNAL MEDICINE

## 2020-04-24 PROCEDURE — 93880 EXTRACRANIAL BILAT STUDY: CPT

## 2020-04-24 PROCEDURE — 82550 ASSAY OF CK (CPK): CPT

## 2020-04-24 PROCEDURE — 97116 GAIT TRAINING THERAPY: CPT

## 2020-04-24 PROCEDURE — 85652 RBC SED RATE AUTOMATED: CPT

## 2020-04-24 PROCEDURE — 65660000000 HC RM CCU STEPDOWN

## 2020-04-24 PROCEDURE — 82784 ASSAY IGA/IGD/IGG/IGM EACH: CPT

## 2020-04-24 PROCEDURE — 97161 PT EVAL LOW COMPLEX 20 MIN: CPT

## 2020-04-24 PROCEDURE — 70551 MRI BRAIN STEM W/O DYE: CPT

## 2020-04-24 PROCEDURE — 99218 HC RM OBSERVATION: CPT

## 2020-04-24 PROCEDURE — 82746 ASSAY OF FOLIC ACID SERUM: CPT

## 2020-04-24 PROCEDURE — 87086 URINE CULTURE/COLONY COUNT: CPT

## 2020-04-24 PROCEDURE — 97165 OT EVAL LOW COMPLEX 30 MIN: CPT

## 2020-04-24 PROCEDURE — 82607 VITAMIN B-12: CPT

## 2020-04-24 PROCEDURE — 83036 HEMOGLOBIN GLYCOSYLATED A1C: CPT

## 2020-04-24 PROCEDURE — 97535 SELF CARE MNGMENT TRAINING: CPT

## 2020-04-24 PROCEDURE — 82306 VITAMIN D 25 HYDROXY: CPT

## 2020-04-24 PROCEDURE — 97530 THERAPEUTIC ACTIVITIES: CPT

## 2020-04-24 PROCEDURE — 93306 TTE W/DOPPLER COMPLETE: CPT

## 2020-04-24 PROCEDURE — 81001 URINALYSIS AUTO W/SCOPE: CPT

## 2020-04-24 PROCEDURE — 80061 LIPID PANEL: CPT

## 2020-04-24 PROCEDURE — 84443 ASSAY THYROID STIM HORMONE: CPT

## 2020-04-24 PROCEDURE — 82272 OCCULT BLD FECES 1-3 TESTS: CPT

## 2020-04-24 PROCEDURE — 72141 MRI NECK SPINE W/O DYE: CPT

## 2020-04-24 PROCEDURE — 92610 EVALUATE SWALLOWING FUNCTION: CPT

## 2020-04-24 PROCEDURE — 36415 COLL VENOUS BLD VENIPUNCTURE: CPT

## 2020-04-24 PROCEDURE — 86038 ANTINUCLEAR ANTIBODIES: CPT

## 2020-04-24 PROCEDURE — 74011250636 HC RX REV CODE- 250/636: Performed by: INTERNAL MEDICINE

## 2020-04-24 PROCEDURE — 80307 DRUG TEST PRSMV CHEM ANLYZR: CPT

## 2020-04-24 PROCEDURE — 96372 THER/PROPH/DIAG INJ SC/IM: CPT

## 2020-04-24 RX ORDER — METOPROLOL TARTRATE 25 MG/1
12.5 TABLET, FILM COATED ORAL EVERY 12 HOURS
Status: DISCONTINUED | OUTPATIENT
Start: 2020-04-24 | End: 2020-05-04 | Stop reason: HOSPADM

## 2020-04-24 RX ORDER — ATORVASTATIN CALCIUM 40 MG/1
40 TABLET, FILM COATED ORAL
Status: DISCONTINUED | OUTPATIENT
Start: 2020-04-24 | End: 2020-05-04 | Stop reason: HOSPADM

## 2020-04-24 RX ORDER — DIAZEPAM 5 MG/1
5 TABLET ORAL
Status: DISCONTINUED | OUTPATIENT
Start: 2020-04-24 | End: 2020-05-04 | Stop reason: HOSPADM

## 2020-04-24 RX ORDER — THERA TABS 400 MCG
1 TAB ORAL DAILY
Status: DISCONTINUED | OUTPATIENT
Start: 2020-04-24 | End: 2020-05-04 | Stop reason: HOSPADM

## 2020-04-24 RX ORDER — DIAZEPAM 5 MG/1
2.5 TABLET ORAL
Status: DISCONTINUED | OUTPATIENT
Start: 2020-04-24 | End: 2020-05-04 | Stop reason: HOSPADM

## 2020-04-24 RX ORDER — FOLIC ACID 1 MG/1
1 TABLET ORAL DAILY
Status: DISCONTINUED | OUTPATIENT
Start: 2020-04-24 | End: 2020-05-04 | Stop reason: HOSPADM

## 2020-04-24 RX ORDER — ASPIRIN 325 MG/1
100 TABLET, FILM COATED ORAL DAILY
Status: DISCONTINUED | OUTPATIENT
Start: 2020-04-24 | End: 2020-05-04 | Stop reason: HOSPADM

## 2020-04-24 RX ADMIN — METOPROLOL TARTRATE 12.5 MG: 25 TABLET, FILM COATED ORAL at 21:00

## 2020-04-24 RX ADMIN — HEPARIN SODIUM 5000 UNITS: 5000 INJECTION INTRAVENOUS; SUBCUTANEOUS at 21:00

## 2020-04-24 RX ADMIN — HEPARIN SODIUM 5000 UNITS: 5000 INJECTION INTRAVENOUS; SUBCUTANEOUS at 00:02

## 2020-04-24 RX ADMIN — HEPARIN SODIUM 5000 UNITS: 5000 INJECTION INTRAVENOUS; SUBCUTANEOUS at 17:25

## 2020-04-24 RX ADMIN — FOLIC ACID 1 MG: 1 TABLET ORAL at 09:47

## 2020-04-24 RX ADMIN — HEPARIN SODIUM 5000 UNITS: 5000 INJECTION INTRAVENOUS; SUBCUTANEOUS at 08:30

## 2020-04-24 RX ADMIN — ATORVASTATIN CALCIUM 40 MG: 40 TABLET, FILM COATED ORAL at 21:00

## 2020-04-24 RX ADMIN — THERA TABS 1 TABLET: TAB at 09:47

## 2020-04-24 RX ADMIN — ASPIRIN 81 MG 81 MG: 81 TABLET ORAL at 08:29

## 2020-04-24 RX ADMIN — METOPROLOL TARTRATE 12.5 MG: 25 TABLET, FILM COATED ORAL at 09:46

## 2020-04-24 RX ADMIN — THIAMINE HCL TAB 100 MG 100 MG: 100 TAB at 09:46

## 2020-04-24 NOTE — PROGRESS NOTES
Problem: Self Care Deficits Care Plan (Adult)  Goal: *Acute Goals and Plan of Care (Insert Text)  Description: FUNCTIONAL STATUS PRIOR TO ADMISSION: Patient was independent and active without use of DME. Pt reports being previously independent in ADLs, does not own any equipment, and has few resources. HOME SUPPORT: Patient is homeless and reports no local supports available. Pt has no equipment    Occupational Therapy Goals  Initiated 4/24/2020   1. Patient will perform standing BUE grooming with modified independence within 7 day(s). 2.  Patient will perform toilet transfers with modified independence within 7 day(s). 3.  Patient will perform all aspects of toileting with independence within 7 day(s). 4.  Patient will participate in upper extremity therapeutic exercise/activities with independence for 10 minutes within 7 day(s). 5.  Patient will utilize energy conservation techniques during functional activities with verbal cues within 7 day(s). Outcome: Progressing Towards Goal     OCCUPATIONAL THERAPY EVALUATION  Patient: Marichuy Tavares (18 y.o. male)  Date: 4/24/2020  Primary Diagnosis: Vertigo [R42]  Vertigo [R42]        Precautions: Seizure    ASSESSMENT  Based on the objective data described below, the patient presents with generalized weakness (proximal > distal UEs and Les), impaired balance, B hand tremor, and slight dysmetria noted during UE coordination testing. Seated EOB, pt able to adjust B socks with increased time and difficulty, CGA for balance. Pt upper body strength is sufficient for brief upper body ADLs, but generally decreased  with LUE being slightly weaker than RUE. Pt able to complete bathroom mobility and stand at sink to brush his teeth with CGA and RW for stability. Pt requiring verbal cues for appropriate use of RW during mobility and for safe hand placements during transfer.  No anticipated OT needs at discharge, but agree that pt would benefit from PT pending pt disposition and available resources. Current Level of Function Impacting Discharge (ADLs/self-care): supervision for bed mobility; CGA for functional mobility and transfers with RW; generally unsteady but no overt LOB    Functional Outcome Measure: The patient scored Total A-D  Total A-D (Motor Function): 57/66 on the Fugl-Perez Assessment which is indicative of mild impairment in upper extremity functional status. Other factors to consider for discharge: homeless, disposition unknown     Patient will benefit from skilled therapy intervention to address the above noted impairments. PLAN :  Recommendations and Planned Interventions: functional mobility training, therapeutic exercise, balance training, endurance activities, neuromuscular re-education, patient education, and home safety training    Frequency/Duration: Patient will be followed by occupational therapy 4 times a week to address goals. Recommendation for discharge: (in order for the patient to meet his/her long term goals)  To be determined: Pt is currently homeless     This discharge recommendation:  Has not yet been discussed the attending provider and/or case management    IF patient discharges home will need the following DME: TBD       SUBJECTIVE:   Patient stated my hands are shaking.     OBJECTIVE DATA SUMMARY:   HISTORY:   Past Medical History:   Diagnosis Date    Alcohol abuse     Bilateral leg weakness     Developmental venous anomaly     History of TIAs    History reviewed. No pertinent surgical history.   Expanded or extensive additional review of patient history:     Home Situation  Home Environment: Other (comment)(homeless)  One/Two Story Residence: Other (Comment)(homeless)  Living Alone: Yes  Support Systems: None(pt unable to verbalize local supports)  Patient Expects to be Discharged to[de-identified] Shelter  Current DME Used/Available at Home: None    Hand dominance: Right    EXAMINATION OF PERFORMANCE DEFICITS:  Cognitive/Behavioral Status:  Neurologic State: Alert; Appropriate for age  Orientation Level: Oriented X4  Cognition: Follows commands; Appropriate for age attention/concentration; Appropriate decision making  Perception: Appears intact  Perseveration: No perseveration noted  Safety/Judgement: Awareness of environment; Fall prevention; Insight into deficits    Hearing: Auditory  Auditory Impairment: None    Vision/Perceptual:    Acuity: Within Defined Limits    Corrective Lenses: Glasses    Range of Motion:  AROM: Generally decreased, functional  PROM: Generally decreased, functional    Strength:  Strength: Generally decreased, functional(grossly 3+/5 except Df 4/5)     Coordination:  Coordination: Generally decreased, functional  Fine Motor Skills-Upper: Left Impaired;Right Impaired    Gross Motor Skills-Upper: Left Intact; Right Intact    Balance:  Sitting: Intact  Standing: Impaired  Standing - Static: Constant support; Fair  Standing - Dynamic : Constant support; Fair    Functional Mobility and Transfers for ADLs:  Bed Mobility:  Rolling: Independent  Supine to Sit: Supervision  Scooting: Supervision    Transfers:  Sit to Stand: Contact guard assistance (cues for hand placement on stable surface)  Stand to Sit: Contact guard assistance (cues for hand placement on stable surface)  Bed to Chair: Contact guard assistance  Bathroom Mobility: Contact guard assistance  Toilet Transfer : Contact guard assistance    ADL Assessment:  Feeding: Setup;Stand-by assistance (2/2 fine motor deficits)    Oral Facial Hygiene/Grooming: Setup;Contact guard assistance (standing at sink)    Upper Body Dressing: Stand-by assistance    Lower Body Dressing: Contact guard assistance; Additional time (seated EOB)    Toileting: Contact guard assistance    ADL Intervention and task modifications:   Cognitive Retraining  Safety/Judgement: Awareness of environment; Fall prevention; Insight into deficits    Functional Measure:  Fugl-Perez Assessment of Motor Recovery after Stroke:   Reflex Activity  Flexors/Biceps/Fingers: Can be elicited  Extensors/Triceps: Can be elicited  Reflex Subtotal: 4    Volitional Movement Within Synergies  Shoulder Retraction: Partial  Shoulder Elevation: Partial  Shoulder Abduction (90 degrees): Partial  Shoulder External Rotation: Full  Elbow Flexion: Full  Forearm Supination: Full  Shoulder Adduction/Internal Rotation: Full  Elbow Extension: Full  Forearm Pronation: Full  Subtotal: 15    Volitional Movement Mixing Synergies  Hand to Lumbar Spine: Full  Shoulder Flexion (0-90 degrees): Full  Pronation-Supination: Full  Subtotal: 6    Volitional Movement With Little or No Synergy  Shoulder Abduction (0-90 degrees): Partial  Shoulder Flexion ( degrees): Partial  Pronation/Supination: Full  Subtotal : 4    Normal Reflex Activity  Biceps, Triceps, Finger Flexors: Full  Subtotal : 2    Upper Extremity Total   Upper Extremity Total: 31    Wrist  Stability at 15 Degree Dorsiflexion: Full  Repeated Dorsiflexion/ Volar Flexion: Full  Stability at 15 Degree Dorsiflexion: Full  Repeated Dorsiflexion/ Volar Flexion: Full  Circumduction: Full  Wrist Total: 10    Hand  Mass Flexion: Full  Mass Extension: Full  Grasp A: Partial  Grasp B: Full  Grasp C: Full  Grasp D: Full  Grasp E: Full  Hand Total: 13    Coordination/Speed  Tremor: Slight  Dysmetria: Slight  Time: 2-5s  Coordination/Speed Total : 3    Total A-D  Total A-D (Motor Function): 57/66     This is a reliable/valid measure of arm function after a neurological event. It has established value to characterize functional status and for measuring spontaneous and therapy-induced recovery; tests proximal and distal motor functions. Fugl-Perez Assessment  UE scores recorded between five and 30 days post neurologic event can be used to predict UE recovery at six months post neurologic event.   Severe = 0-21 points   Moderately Severe = 22-33 points   Moderate = 34-47 points Mild = 48-66 points  KATI Rob, HAYLEY Heard, & MITUL Munoz (1992). Measurement of motor recovery after stroke: Outcome assessment and sample size requirements. Stroke, 23, pp. 4205-9949. Occupational Therapy Evaluation Charge Determination   History Examination Decision-Making   LOW Complexity : Brief history review  LOW Complexity : 1-3 performance deficits relating to physical, cognitive , or psychosocial skils that result in activity limitations and / or participation restrictions  LOW Complexity : No comorbidities that affect functional and no verbal or physical assistance needed to complete eval tasks       Based on the above components, the patient evaluation is determined to be of the following complexity level: LOW   Pain Rating:  Pt not reporting pain this session. Activity Tolerance:   Good  Please refer to the flowsheet for vital signs taken during this treatment. After treatment patient left in no apparent distress:    Sitting in chair, Call bell within reach, and Bed / chair alarm activated    COMMUNICATION/EDUCATION:   The patients plan of care was discussed with: Physical therapist and Registered nurse. Home safety education was provided and the patient/caregiver indicated understanding., Patient/family have participated as able in goal setting and plan of care. , and Patient/family agree to work toward stated goals and plan of care.     Thank you for this referral.  Finesse Pandey OT  Time Calculation: 29 mins

## 2020-04-24 NOTE — PROGRESS NOTES
SPEECH PATHOLOGY BEDSIDE SWALLOW EVALUATION/DISCHARGE  Patient: Clyde Vallejo (46 y.o. male)  Date: 4/24/2020  Primary Diagnosis: Vertigo [R42]       Precautions:        ASSESSMENT :  Based on the objective data described below, the patient presents with no oral or pharyngeal dysphagia and no overt s/s aspiration observed. Skilled acute therapy provided by a speech-language pathologist is not indicated at this time. PLAN :  Recommendations:  -Continue regular/thin liquid diet, straws ok, meds as tolerated  Discharge Recommendations: None     SUBJECTIVE:   Patient denied decline in swallowing, speech, language, or cognition. Oriented x4. OBJECTIVE:   No past medical history on file. No past surgical history on file. Prior Level of Function/Home Situation:   Home Situation  Home Environment: Other (comment)(homeless)  One/Two Story Residence: Other (Comment)(homeless)  Living Alone: Yes  Support Systems: Family member(s)  Patient Expects to be Discharged to[de-identified] Shelter  Current DME Used/Available at Home: None  Diet prior to admission: regular/thin  Current Diet:  Regular/thin   Cognitive and Communication Status:  Neurologic State: Alert, Appropriate for age  Orientation Level: Oriented X4  Cognition: Follows commands  Perception: Appears intact  Perseveration: No perseveration noted  Safety/Judgement: Awareness of environment  Oral Assessment:  Oral Assessment  Labial: No impairment  Dentition: Natural  Oral Hygiene: moist oral mucosa  Lingual: No impairment  Velum: No impairment  Mandible: No impairment  P.O. Trials:  Patient Position: upright in bed  Vocal quality prior to P.O.: No impairment  Consistency Presented: Thin liquid; Solid  How Presented: Self-fed/presented;Cup/sip;Straw;Successive swallows     Bolus Acceptance: No impairment  Bolus Formation/Control: No impairment     Propulsion: No impairment  Oral Residue: None  Initiation of Swallow: No impairment  Laryngeal Elevation: Functional  Aspiration Signs/Symptoms: None  Pharyngeal Phase Characteristics: No impairment, issues, or problems   Effective Modifications: None  Cues for Modifications: None       Oral Phase Severity: No impairment  Pharyngeal Phase Severity : No impairment  NOMS:   The NOMS functional outcome measure was used to quantify this patient's level of swallowing impairment. Based on the NOMS, the patient was determined to be at level 7 for swallow function     NOMS Swallowing Levels:  Level 1 (CN): NPO  Level 2 (CM): NPO but takes consistency in therapy  Level 3 (CL): Takes less than 50% of nutrition p.o. and continues with nonoral feedings; and/or safe with mod cues; and/or max diet restriction  Level 4 (CK): Safe swallow but needs mod cues; and/or mod diet restriction; and/or still requires some nonoral feeding/supplements  Level 5 (CJ): Safe swallow with min diet restriction; and/or needs min cues  Level 6 (CI): Independent with p.o.; rare cues; usually self cues; may need to avoid some foods or needs extra time  Level 7 ([address] [place] [address]): Independent for all p.o.  [place]. (2003). National Outcomes Measurement System (NOMS): Adult Speech-Language Pathology User's Guide. Pain:           After treatment:   Patient left in no apparent distress in bed, Call bell within reach and Nursing notified    COMMUNICATION/EDUCATION:   The patient's plan of care including recommendations, planned interventions, and recommended diet changes were discussed with: Registered nurse.      Thank you for this referral.  Jordy Benavidez SLP  Time Calculation: 15 mins

## 2020-04-24 NOTE — PROGRESS NOTES

## 2020-04-24 NOTE — PROGRESS NOTES
Please complete MRI screening form and fax to [phone] when complete. Will call you with a time once the form is completed and faxed.  tacos BOO

## 2020-04-24 NOTE — PROGRESS NOTES
Problem: Mobility Impaired (Adult and Pediatric)  Goal: *Acute Goals and Plan of Care (Insert Text)  Description:   FUNCTIONAL STATUS PRIOR TO ADMISSION: Patient is homeless, unclear where he had been sleeping and does not have a car. Patient reports he ambulated without AD up until day of hospitalization. HOME SUPPORT PRIOR TO ADMISSION: Patient report no friends or family to provide assistance. Physical Therapy Goals  Initiated 4/24/2020    1. Patient will transfer from bed to chair and chair to bed with supervision/set-up using the least restrictive device within 7 day(s). 2.  Patient will perform sit to stand with supervision/set-up within 7 day(s). 3.  Patient will ambulate with supervision/set-up for 200 feet with the least restrictive device within 7 day(s). 4.  Patient will improve Monson Balance score by 7 points within 7 days. Outcome: Not Met   PHYSICAL THERAPY EVALUATION- NEURO POPULATION  Patient: Marcihuy Tavares (35 y.o. male)  Date: 4/24/2020  Primary Diagnosis: Vertigo [R42]  Vertigo [R42]        Precautions:  Seizure      ASSESSMENT  Based on the objective data described below, the patient presents with weakness of LEs, balance deficits which increase falls risk, ataxic gait, and decreased ability to safety transfer and ambulate. Patient received in bed and agreeable to participate, able to demonstrate bed mobility at indep level and sitting balance is intact. Patient came to stand with CGA and wide CHIVO and ambulated into bathroom with hand held assist and unsteady gait, mild ataxia noted. Patient educated on use of RW and was able to use it coming out of bathroom, required cuing for correct sequencing but gait steadier. Patient left sitting up in chair with call bell in reach and chair alarm. Patient will likely require PT post discharge, but at this time he is homeless so therapy  recommendations will depend on where he will be discharging.      Current Level of Function Impacting Discharge (mobility/balance): CGA for mobility with RW due to mild ataxia    Functional Outcome Measure: The patient scored Total: 26/56 on the Monson Balance Assessment which is indicative of moderate fall risk. Other factors to consider for discharge: currently homeless, falls risk     Patient will benefit from skilled therapy intervention to address the above noted impairments. PLAN :  Recommendations and Planned Interventions: bed mobility training, transfer training, gait training, therapeutic exercises, neuromuscular re-education, patient and family training/education, and therapeutic activities      Frequency/Duration: Patient will be followed by physical therapy:  4 times a week to address goals. Recommendation for discharge: (in order for the patient to meet his/her long term goals)  To be determined: will depend on where patient is going to live     This discharge recommendation:  A follow-up discussion with the attending provider and/or case management is planned    IF patient discharges home will need the following DME: rolling walker         SUBJECTIVE:   Patient stated my legs feel weak.     OBJECTIVE DATA SUMMARY:   HISTORY:    Past Medical History:   Diagnosis Date    Alcohol abuse     Bilateral leg weakness     Developmental venous anomaly     History of TIAs    History reviewed. No pertinent surgical history.     Personal factors and/or comorbidities impacting plan of care: ETOH+, no family support, homeless    Home Situation  Home Environment: Other (comment)(homeless)  One/Two Story Residence: Other (Comment)(homeless)  Living Alone: Yes  Support Systems: None(pt unable to verbalize local supports)  Patient Expects to be Discharged to[de-identified] Shelter  Current DME Used/Available at Home: None    EXAMINATION/PRESENTATION/DECISION MAKING:   Critical Behavior:  Neurologic State: Alert, Appropriate for age  Orientation Level: Oriented X4  Cognition: Follows commands, Appropriate for age attention/concentration, Appropriate decision making  Safety/Judgement: Awareness of environment, Fall prevention, Insight into deficits  Hearing: Auditory  Auditory Impairment: None  Range Of Motion:  AROM: Generally decreased, functional           PROM: Generally decreased, functional           Strength:    Strength: Generally decreased, functional(grossly 3+/5 except Df 4/5)                    Tone & Sensation:                                  Coordination:  Coordination: Generally decreased, functional  Vision:   Acuity: Within Defined Limits  Corrective Lenses: Glasses  Functional Mobility:  Bed Mobility:  Rolling: Independent  Supine to Sit: Independent        Transfers:  Sit to Stand: Contact guard assistance  Stand to Sit: Contact guard assistance        Bed to Chair: Contact guard assistance              Balance:   Sitting: Intact  Standing: Impaired  Standing - Static: Constant support; Fair  Standing - Dynamic : Constant support; Fair  Ambulation/Gait Training:                                                         Stairs:               Therapeutic Exercises:   Instructed in LAQ and seated marching    Functional Measure  Monson Balance Test:    Sitting to Standing: 3  Standing Unsupported: 2  Sitting with Back Unsupported: 4  Standing to Sitting: 3  Transfers: 3  Standing Unsupported with Eyes Closed: 2  Standing Unsupported with Feet Together: 2  Reach Forward with Outstretched Arm: 3   Object: 1  Turn to Look Over Shoulders: 2  Turn 360 Degrees: 1  Alternate Foot on Step/Stool: 0  Standing Unsupported One Foot in Front: 0  Stand on One Le  Total: 26/56         56=Maximum possible score;   0-20=High fall risk  21-40=Moderate fall risk   41-56=Low fall risk        Physical Therapy Evaluation Charge Determination   History Examination Presentation Decision-Making   LOW Complexity : Zero comorbidities / personal factors that will impact the outcome / POC LOW Complexity : 1-2 Standardized tests and measures addressing body structure, function, activity limitation and / or participation in recreation  LOW Complexity : Stable, uncomplicated  LOW Complexity : FOTO score of       Based on the above components, the patient evaluation is determined to be of the following complexity level: LOW     Pain Rating:      Activity Tolerance:   Good  Please refer to the flowsheet for vital signs taken during this treatment. After treatment patient left in no apparent distress:   Sitting in chair, Call bell within reach, and Bed / chair alarm activated    COMMUNICATION/EDUCATION:   The patients plan of care was discussed with: Occupational therapist and Registered nurse. Patient was educated regarding his deficit(s) of LE weakness and balance deficits. He demonstrated Good understanding as evidenced by discussion. Fall prevention education was provided and the patient/caregiver indicated understanding. and Patient/family agree to work toward stated goals and plan of care.     Thank you for this referral.  Adriel Granado, PT   Time Calculation: 32 mins

## 2020-04-24 NOTE — PROGRESS NOTES
Initial Nutrition Assessment:    INTERVENTIONS/RECOMMENDATIONS:   · Continue Regular diet     ASSESSMENT:   Patient medically noted for vertigo/ataxia, alcohol abuse, HTN. PMH Stroke. Receiving a regular diet. Patient reports eating well at lunch today. Appetite improved compared to PTA. He knows he has lost weight but is unsure of amount or timeframe. Discussed availability of supplements as needed. For now, patient feels he is eating well. Encouraged intake of meals. Diet Order: Regular  % Eaten:  No data found. Pertinent Medications: [x]Reviewed []Other: Atorvastatin, Folic Acid, Lopressor, MVI, Thiamine   Pertinent Labs: [x]Reviewed []Other:   Food Allergies: [x]None []Yes:    Last BM: 4/23   []Active     []Hyperactive  []Hypoactive       [] Absent BS  Skin:    [x] Intact   [] Incision  [] Breakdown: [] Edema []Other:    Anthropometrics:   Height: 5' 11\" (180.3 cm) Weight: 77.1 kg (170 lb)   IBW (%IBW):   ( ) UBW (%UBW):   (  %)   Last Weight Metrics:  Weight Loss Metrics 4/24/2020 5/13/2010   Today's Wt 170 lb 218 lb 0.6 oz   BMI 23.71 kg/m2 30.42 kg/m2       BMI: Body mass index is 23.71 kg/m². This BMI is indicative of:   []Underweight    [x]Normal    []Overweight    [] Obesity   [] Extreme Obesity (BMI>40)     Estimated Nutrition Needs (Based on):   2148 Kcals/day(BMR (1652) x 1. 3AF) , 77 g(-92g (1.0-1.2 g/kg bw)) Protein  Carbohydrate:  At Least 130 g/day  Fluids: 2150 mL/day (1ml/kcal)    Pt expected to meet estimated nutrient needs: [x]Yes []No    NUTRITION DIAGNOSES:   Problem:  No nutritional diagnosis at this time      Etiology: related to       Signs/Symptoms: as evidenced by        NUTRITION INTERVENTIONS:  Meals/Snacks: General/healthful diet                  GOAL:   PO intake >70% of meals next 5-7 days    LEARNING NEEDS (Diet, Food/Nutrient-Drug Interaction):    [x] None Identified   [] Identified and Education Provided/Documented   [] Identified and Pt declined/was not appropriate Cultural, Methodist, OR Ethnic Dietary Needs:    [x] None Identified   [] Identified and Addressed     [x] Interdisciplinary Care Plan Reviewed/Documented    [x] Discharge Planning:   Regular diet     MONITORING /EVALUATION:   Food/Nutrient Intake Outcomes:  Total energy intake  Physical Signs/Symptoms Outcomes: Weight/weight change, Electrolyte and renal profile    NUTRITION RISK:    [] Patient At Nutritional Risk              [x] Patient Not at Nutritional Risk    PT SEEN FOR:    []  MD Consult: []Calorie Count      []Diabetic Diet Education        []Diet Education     []Electrolyte Management     []General Nutrition Management and Supplements     []Management of Tube Feeding     []TPN Recommendations    [x]  RN Referral:  [x]MST score >=2     []Enteral/Parenteral Nutrition PTA     []Pregnant: Gestational DM or Multigestation     []Pressure Ulcer/Wound Care needs        []  Low BMI  []  LOS Saddie Kehr [phone]  Pager [phone]    Weekend Pager [phone]

## 2020-04-24 NOTE — PHYSICIAN ADVISORY
Letter of Status Determination:  
Recommend hospitalization status upgraded from OBSERVATION  to INPATIENT  Status Pt Name:  Mercedez Talavera MR#  
LETITIA # [id number] / 
[id number] Payor: Sandy Yasemin / Plan: [address] / Product Type: Managed Care Medicaid /   
[facility]#  [id number] Room and Hospital  3121/01  @ [facility] Hospitalization date  4/23/2020 12:46 PM  
Current Attending Physician  Tuan Gloria MD  
Principal diagnosis  Ataxia Clinicals  54-year-old -American male Originally from [facility], currently is homeless Says he had a stroke in [facility] in January 2020 Presented with similar symptoms to today Says he is not currently taking an aspirin or any prescription medications Awoke this morning suddenly felt unsteady when he walked \"like I was staggering\" Initially denied drinking but had an alcohol level of 208 in the emergency room No headache No speech changes or visual changes No focal weakness but did admit to bilateral leg weakness No sensory changes No neck or back pain No fevers chills, cough, shortness of breath, chest pain, nausea vomiting, diarrhea, abdominal pain Called EMS because of the unsteady gait 
 Hemodynamically stable Head CT scan negative CTA head and neck with no large vessel occlusion and no significant vascular disease Right frontal developmental venous anomaly Tele neurology consulted, recommended admission for MRI and further evaluation 
likely alcohol and BPPV,  Was acutely intoxicated on admit. risk of withdrawal.  For now continue CIWA protocol with low dose PO ativan. Sz precautions. Cannot discharge as he may be high risk. Goody vitamins PO. Advise cessation. [place] (MCG) criteria Does  NOT apply STATUS DETERMINATION  INPATIENT The final decision of the patient's hospitalization status depends on the attending physician's judgment Additional comments Payor: [facility] MEDICAID / Plan: [address] / Product Type: Managed Care Medicaid /   
  
 
Greg Camejo MD 
Cell: [phone] Physician Advisor

## 2020-04-24 NOTE — PROGRESS NOTES
Spiritual Care Assessment/Progress Note  [facility]      NAME: Yevgeniy Forrest      MRN: [id number]  AGE: 47 y.o.  SEX: male  Worship Affiliation: Rastafari   Language: English     4/24/2020     Total Time (in minutes): 40     Spiritual Assessment begun in [facility] NEUROSCIENCE TELEMETRY through conversation with:         [x]Patient        [] Family    [] Friend(s)        Reason for Consult: Advance medical directive consult     Spiritual beliefs: (Please include comment if needed)     [] Identifies with a tariq tradition:         [] Supported by a tariq community:            [] Claims no spiritual orientation:           [] Seeking spiritual identity:                [x] Adheres to an individual form of spirituality:           [] Not able to assess:                           Identified resources for coping:      [x] Prayer                               [] Music                  [] Guided Imagery     [] Family/friends                 [] Pet visits     [] Devotional reading                         [] Unknown     [] Other:                                            Interventions offered during this visit: (See comments for more details)    Patient Interventions: Advance medical directive consult, Affirmation of tariq, End of life issues discussed, Iconic (affirming the presence of God/Higher Power)           Plan of Care:     [x] Support spiritual and/or cultural needs    [x] Support AMD and/or advance care planning process      [] Support grieving process   [] Coordinate Rites and/or Rituals    [] Coordination with community clergy   [] No spiritual needs identified at this time   [] Detailed Plan of Care below (See Comments)  [] Make referral to Music Therapy  [] Make referral to Pet Therapy     [] Make referral to Addiction services  [] Make referral to [facility]  [] Make referral to Spiritual Care Partner  [] No future visits requested        [x] Follow up visits as needed     Comments: Responded to in-basket request for advance medical directive consult. No visitors present. Consulted with nurse, Cassandra Moulton [phone]. Explained advance medical directive to patient and answered questions he had. Patient has never considered end of life wishes. He also has not thought who he would want to be his medical decision maker. Left document for his review and encouraged him to have end of life conversation with his siblings. Patient shared he has good spiritual support, but currently has no place to live. He was receptive to assurance of prayer.     Nelsy Guzman, MPS, [address] [facility], [address] [place] [address] [employer] Paging Service  [phone])

## 2020-04-24 NOTE — PROGRESS NOTES
Spiritual Care Assessment/Progress Note [address] 
 
 
NAME: Ed Ding      MRN: [id number] AGE: 47 y.o. SEX: male Temple Affiliation: [employer]  
Language: [place] 4/24/2020     Total Time (in minutes): 1 Spiritual Assessment begun in [facility] NEUROSCIENCE TELEMETRY through conversation with: 
  
    []Patient        [] Family    [] Friend(s) Reason for Consult: Advance medical directive consult Spiritual beliefs: (Please include comment if needed) 
   [] Identifies with a tariq tradition:     
   [] Supported by a tariq community:        
   [] Claims no spiritual orientation:       
   [] Seeking spiritual identity:            
   [] Adheres to an individual form of spirituality:       
   [x] Not able to assess:                   
 
    
Identified resources for coping:  
   [] Prayer                           
   [] Music                  [] Guided Imagery 
   [] Family/friends                 [] Pet visits [] Devotional reading                         [x] Unknown 
   [] Other:                                         
 
 
Interventions offered during this visit: (See comments for more details) Patient Interventions: Initial visit, Other (comment)(consulted with Ankit Dempsey RN) Plan of Care: 
 
 [x] Support spiritual and/or cultural needs  
 [] Support AMD and/or advance care planning process    
 [] Support grieving process 
 [] Coordinate Rites and/or Rituals  
 [] Coordination with community clergy [] No spiritual needs identified at this time 
 [] Detailed Plan of Care below (See Comments)  [] Make referral to Music Therapy 
[] Make referral to Pet Therapy    
[] Make referral to Addiction services 
[] Make referral to [facility] 
[] Make referral to Spiritual Care Partner 
[] No future visits requested       
[x] Follow up visits as needed Comments:   Attempted Advance Medical Directive consult in Neuro unit. Consulted with Niya Haas RN. Patient is off unit at this time. Left AMD document for patient with note to have Niya Haas page  if he wants assistance completing the document. Chaplains will follow as able and/or as needed. GUICHO Quinteros, [facility], Staff  [facility]  Paging Service  [phone])

## 2020-04-24 NOTE — PROGRESS NOTES
[facility]    Medical Progress Note      NAME: Josesito Brown   :  1970  MRM:  [id number]    Date/Time of service 2020  8:19 AM          Assessment and Plan:     Ataxia / Vertigo - POA, likely alcohol and BPPV. May have some degree of malingering. He reports CVA based on MRI in January, we will repeat it. Neurology consult. Continue ASA, atorvastatin. Check ECHO. Check A1c, TSH and lipids panel. PT/OT eval.      Alcohol abuse - Was acutely intoxicated on admit. Unclear risk of withdrawal.  For now continue CIWA protocol with low dose PO ativan. Sz precautions. Cannot discharge as he may be high risk. Goody vitamins PO. Advise cessation. HTN (hypertension) - Held meds for permissive HTN. Resume metoprolol today. Hypercholesteremia - Continue atovastatin    Non compliance - Consult CM. He is homeless and could not afford his medications and has no follow up. Subjective:     Chief Complaint:  Still reports left leg is a bit weak    ROS:  (bold if positive, if negative)    Tolerating some PT  Tolerating Diet        Objective:     Last 24hrs VS reviewed since prior progress note.  Most recent are:    Visit Vitals  /82   Pulse 62   Temp 97.8 °F (36.6 °C)   Resp 18   Ht 5' 11\" (1.803 m)   Wt 77.4 kg (170 lb 9 oz)   SpO2 97%   BMI 23.79 kg/m²     SpO2 Readings from Last 6 Encounters:   20 97%        No intake or output data in the 24 hours ending 20 0819     Physical Exam:    Gen:  Well-developed, well-nourished, in no acute distress  HEENT:  Pink conjunctivae, PERRL, hearing intact to voice, moist mucous membranes  Neck:  Supple, without masses, thyroid non-tender  Resp:  No accessory muscle use, clear breath sounds without wheezes rales or rhonchi  Card:  No murmurs, normal S1, S2 without thrills, bruits or peripheral edema  Abd:  Soft, non-tender, non-distended, normoactive bowel sounds are present, no mass  Lymph:  No cervical or inguinal adenopathy  Musc:  No cyanosis or clubbing  Skin:  No rashes or ulcers, skin turgor is good  Neuro:  Cranial nerves are grossly intact, no focal motor weakness, follows commands   Psych:  Poor insight, oriented to person, place and time, alert    Telemetry reviewed:   normal sinus rhythm  __________________________________________________________________  Medications Reviewed: (see below)  Medications:     Current Facility-Administered Medications   Medication Dose Route Frequency    atorvastatin (LIPITOR) tablet 40 mg  40 mg Oral QHS    thiamine mononitrate (B-1) tablet 100 mg  100 mg Oral DAILY    folic acid (FOLVITE) tablet 1 mg  1 mg Oral DAILY    therapeutic multivitamin (THERAGRAN) tablet 1 Tab  1 Tab Oral DAILY    acetaminophen (TYLENOL) tablet 650 mg  650 mg Oral Q6H PRN    ondansetron (ZOFRAN) injection 4 mg  4 mg IntraVENous Q6H PRN    labetaloL (NORMODYNE;TRANDATE) injection 5 mg  5 mg IntraVENous Q10MIN PRN    acetaminophen (TYLENOL) tablet 650 mg  650 mg Oral Q4H PRN    aspirin chewable tablet 81 mg  81 mg Oral DAILY    heparin (porcine) injection 5,000 Units  5,000 Units SubCUTAneous Q8H    thiamine mononitrate (B-1) tablet 100 mg  100 mg Oral DAILY    diazePAM (VALIUM) tablet 10 mg  10 mg Oral Q1H PRN    diazePAM (VALIUM) tablet 20 mg  20 mg Oral Q1H PRN        Lab Data Reviewed: (see below)  Lab Review:     Recent Labs     04/23/20  1326   WBC 3.7*   HGB 15.5   HCT 47.1        Recent Labs     04/23/20  1624 04/23/20  1326   NA  --  138   K  --  4.2   CL  --  101   CO2  --  30   GLU  --  94   BUN  --  7   CREA  --  0.94   CA  --  8.2*   ALB  --  4.1   TBILI  --  0.2   SGOT  --  29   ALT  --  28   INR 1.0  --      Lab Results   Component Value Date/Time    Glucose (POC) 86 04/23/2020 01:34 PM     No results for input(s): PH, PCO2, PO2, HCO3, FIO2 in the last 72 hours.   Recent Labs     04/23/20  1624   INR 1.0     All Micro Results     Procedure Component Value Units Date/Time CULTURE, URINE [[id number]]     Order Status:  Sent Specimen:  Urine from Clean catch           Other pertinent lab: none    Total time spent with patient: 39 Minutes I personally reviewed chart, notes, data and current medications in the medical record. I have personally examined and treated the patient at bedside during this period.                  Care Plan discussed with: Patient, Care Manager, Nursing Staff, Consultant/Specialist and >50% of time spent in counseling and coordination of care    Discussed:  Care Plan and D/C Planning    Prophylaxis:  H2B/PPI    Disposition:   PT, OT, RN           ___________________________________________________    Attending Physician: Alberto Ovalle MD

## 2020-04-24 NOTE — PROGRESS NOTES
CM attempted to speak to patient regarding discharge needs. No phone in patient's room - nursing getting a phone from maintenance so that CM can follow patient. CM providing care via telephone due to Covid-19 restrictions.     Salvatore Stephens, RN, BSN, [address] [place] [address]    Coordinator  [phone]

## 2020-04-24 NOTE — ROUTINE PROCESS
Bedside and Verbal shift change report given to Ruth (oncoming nurse) by Roberto Fernando (offgoing nurse). Report included the following information SBAR, Kardex, Intake/Output and MAR. Zone Phone:   [phone] Significant changes during shift:  none Patient Information Carlos Castaneda 
47 y.o. 
4/23/2020 12:46 PM by Kecia Buenrostro MD. Carlos Castaneda was admitted from Home 
 
Problem List 
 
Patient Active Problem List  
 Diagnosis Date Noted  Alcohol abuse 04/24/2020  
 HTN (hypertension) 04/24/2020  Hypercholesteremia 04/24/2020  Ataxia 04/24/2020  Bilateral carotid artery stenosis 04/24/2020  
 Hx-TIA (transient ischemic attack) 04/24/2020  
 History of TIAs  Bilateral leg weakness  Vertigo 04/23/2020 Past Medical History:  
Diagnosis Date  Alcohol abuse  Bilateral leg weakness  Developmental venous anomaly  History of TIAs Core Measures: CVA: Yes Yes Activity Status: OOB to Chair Yes Ambulated this shift Yes Bed Rest No 
 
DVT prophylaxis: DVT prophylaxis Med- Yes DVT prophylaxis SCD or CARMITA- No  
 
Wounds: (If Applicable) Wounds- No 
 
Patient Safety: 
 
Falls Score Total Score: 3 Safety Level_______ Bed Alarm On? Yes Sitter? No 
 
Plan for upcoming shift: safety, neuro checks, Discharge Plan: Yes when ready Active Consults: 
IP CONSULT TO NEUROLOGY 
IP CONSULT TO NEUROSURGERY

## 2020-04-24 NOTE — PROGRESS NOTES
Patient has severe spinal stenosis at C4-5, with cord edema, and this most likely is causing his leg weakness and paraplegia and patient is going to need surgery because he is clearly symptomatic. He has a questionable vascular small lacunar infarct in the posterior splenium of corpus callosum, which is a very strange place to have a stroke, and may actually be more metabolic but even so he can just continue the aspirin and statin, his main problem is the spinal stenosis causing the wobbly and weak legs and unsteady gait  His carotid Dopplers were normal.  We will follow as needed, this is more a neurosurgical problem and he can just empirically take the aspirin and statin for his questionable microinfarct  We can see him back in the office in 2 to 4 weeks in the stroke clinic.

## 2020-04-24 NOTE — PROGRESS NOTES
Reason for Admission:  Bilateral lower extremity weakness; ataxia                    RUR Score:          9%           Plan for utilizing home health:      Patient currently homeless; requesting shelter or Medical Respite    PCP: First and Last name:  None   Name of Practice:    Are you a current patient: Yes/No:    Approximate date of last visit:                     Current Advanced Directive/Advance Care Plan: Patient has no current ACP -                          Transition of Care Plan:        Patient currently homeless;  States he lost his job \"when they closed the doors due to [employer]". Patient states he does not have a job or a place to live - patient states he is open to going to homeless shelter, but feels getting some strengthening and help prior to shelter would be beneficial.  Patient open to Medical Respite if accepted. YVONNE Plan:  1) [facility] - referral to be sent once discharge summary available  2) [facility] if not accepted to Medical Respite  3) Patient has active Medicaid to pay for all medications  4) Patient will need transportation at discharge    Care Management Interventions  PCP Verified by CM: No  Transition of Care Consult (CM Consult):  Other(CM Assessment - homeless)  Current Support Network: Mk Castillo RN, BSN, [address] [place] [address]    Coordinator  [phone]

## 2020-04-24 NOTE — CONSULTS
Consult  REFERRED BY:  None    CHIEF COMPLAINT: Bilateral leg weakness      Subjective:     Sosa Garcia is a 47 y.o. right-handed male seen as a new patient to us, for evaluation of new problem bilateral leg weakness and unsteadiness that has been present for the last 2 to 3 days, and he was admitted to the hospital had a normal CT of the head and CTA of the head last night on admission. He denies any weakness in his arms, but does have a history of drinking somewhat heavily at times, said he stopped 2 days ago but still had a blood alcohol level of 208. He is scheduled for an MRI scan carotid Dopplers and MRA of the brain. He does have hyperactive reflexes in his legs, and for that reason we will check an MRI of the cervical spine and a B12 level. Patient did have a history of a TIA 1 year ago in January 2019, that was thought to be due to dehydration, alcohol, and not eating properly. Patient denies any chest pain or palpitations, denies any fever or meningismus, denies any head trauma, and denies any major neck pain, denies any other cause of his symptoms. Patient was supposed to be on a baby aspirin but has not been taking his medications. He has no history of atrial fibrillation    No past medical history on file. No past surgical history on file. No family history on file.    Social History     Tobacco Use    Smoking status: Never Smoker   Substance Use Topics    Alcohol use: Yes     Comment: social drinker         Current Facility-Administered Medications:     atorvastatin (LIPITOR) tablet 40 mg, 40 mg, Oral, QHS, Reyes Toribio MD    thiamine mononitrate (B-1) tablet 100 mg, 100 mg, Oral, DAILY, Monroe Griffiths MD, 100 mg at 51/43/33 4899    folic acid (FOLVITE) tablet 1 mg, 1 mg, Oral, DAILY, Monroe Griffiths MD, 1 mg at 04/24/20 0947    therapeutic multivitamin [facility]) tablet 1 Tab, 1 Tab, Oral, DAILY, Monroe Griffiths MD, 1 Tab at 04/24/20 0947    metoprolol tartrate (LOPRESSOR) tablet 12.5 mg, 12.5 mg, Oral, Q12H, Surinder White MD, 12.5 mg at 04/24/20 0946    diazePAM (VALIUM) tablet 2.5 mg, 2.5 mg, Oral, Q1H PRN, Surinder White MD    diazePAM (VALIUM) tablet 5 mg, 5 mg, Oral, Q1H PRN, Surinder White MD    acetaminophen (TYLENOL) tablet 650 mg, 650 mg, Oral, Q6H PRN, Tim Leo MD    ondansetron [facility]) injection 4 mg, 4 mg, IntraVENous, Q6H PRN, Robbie Toribio MD    labetaloL (NORMODYNE;TRANDATE) injection 5 mg, 5 mg, IntraVENous, Q10MIN PRN, Robbie Montoya MD    aspirin chewable tablet 81 mg, 81 mg, Oral, DAILY, Robbie Toribio MD, 81 mg at 04/24/20 0829    heparin (porcine) injection 5,000 Units, 5,000 Units, SubCUTAneous, Q8H, Robbie Toribio MD, 5,000 Units at 04/24/20 0830        No Known Allergies   MRI Results (most recent):  No results found for this or any previous visit. No results found for this or any previous visit. Review of Systems:  A comprehensive review of systems was negative except for: Neurological: positive for paresthesia, coordination problems, gait problems and weakness  Behvioral/Psych: positive for anxiety and depression   Vitals:    04/23/20 2352 04/24/20 0518 04/24/20 0756 04/24/20 1104   BP: 115/83 158/68 171/82 171/82   Pulse: 73 64 62    Resp: 16 18 18    Temp: 98.7 °F (37.1 °C) 98.3 °F (36.8 °C) 97.8 °F (36.6 °C)    SpO2: 97% 98% 97%    Weight:    170 lb (77.1 kg)   Height:    5' 11\" (1.803 m)     Objective:     I      NEUROLOGICAL EXAM:    Appearance: The patient is fairly developed and nourished, provides a fair history and is in no acute distress. Mental Status: Oriented to time, place and person, and the president, cognitive function is normal and speech is fluent and no aphasia or dysarthria. Mood and affect appropriate. Cranial Nerves:   Intact visual fields. Fundi are benign, disc are flat, no lesions seen on funduscopy. MUSHTAQ, EOM's full, no nystagmus, no ptosis.  Facial sensation is normal. Corneal reflexes are not tested. Facial movement is symmetric. Hearing is normal bilaterally. Palate is midline with normal sternocleidomastoid and trapezius muscles are normal. Tongue is midline. Neck without meningismus or bruits  Temporal arteries are not tender or enlarged  TMJ areas are not tender on palpation   Motor:  4/5 strength in upper and lower proximal and distal muscles. Normal bulk and tone. No fasciculations. Rapid alternating movement is symmetric and intact bilaterally   Reflexes:   Deep tendon reflexes 2+/4 and symmetrical in the upper extremity, and 3+ knee jerks bilaterally, and 1+ ankle jerks bilaterally. No babinski or clonus present   Sensory:   Abnormal to touch, pinprick and vibration and temperature in his feet. DSS is intact   Gait:  Abnormal gait with patient mildly weak and unsteady with wide-based gait. Tremor:   No tremor noted. Cerebellar:   Mildly abnormal cerebellar signs present on Romberg and tandem testing and finger-nose-finger exam.   Neurovascular:  Normal heart sounds and regular rhythm, peripheral pulses decreased, and no carotid bruits. Assessment:       ICD-10-CM ICD-9-CM    1. Vertigo R42 780.4    2. Slurred speech R47.81 784.59    3. Alcohol abuse F10.10 305.00      Active Problems:    Vertigo (4/23/2020)      Alcohol abuse (4/24/2020)      HTN (hypertension) (4/24/2020)      Hypercholesteremia (4/24/2020)      Ataxia (4/24/2020)        Plan:     Patient with some brisk reflexes at his knees, which raises the possibility of some type of cervical problem for his leg weakness will include an MRI of the neck to rule out spinal stenosis. Patient most likely has the weakness in his leg secondary to poor nutrition, and alcohol excess and dehydration. We will check an MRI of the brain and carotid Dopplers just to make sure.   We will check his metabolic parameters N72 thyroid looking for any type of nutritional causes for his symptoms. PT OT and speech therapy to see the patient, continue the aspirin therapy, and possibly a statin. Complete neurovascular and neurologic evaluation ordered. He may have a component of mild cerebellar ataxia secondary to his drinking. We will follow carefully with you.   Continue vitamin supplementation and watch for DTs    Signed By: Rosalie Nunez MD     April 24, 2020       CC: None  FAX: None

## 2020-04-24 NOTE — PROGRESS NOTES
Radiology  MRI Head  IMPRESSION:   1. Focal restricted diffusion and T2 hyperintensity in the splenium of the corpus callosum. See above. 2. Mild generalized volume loss. 3. No other acute intracranial abnormality demonstrated.

## 2020-04-25 ENCOUNTER — APPOINTMENT (OUTPATIENT)
Dept: ULTRASOUND IMAGING | Age: 50
DRG: 045 | End: 2020-04-25
Attending: INTERNAL MEDICINE
Payer: MEDICAID

## 2020-04-25 PROBLEM — M48.02 CERVICAL SPINAL STENOSIS: Status: ACTIVE | Noted: 2020-04-25

## 2020-04-25 PROBLEM — Q21.12 PFO (PATENT FORAMEN OVALE): Status: ACTIVE | Noted: 2020-04-25

## 2020-04-25 LAB
AMPHETAMINES UR QL SCN: NEGATIVE NG/ML
ANA SER QL: NEGATIVE
BACTERIA SPEC CULT: NORMAL
BARBITURATES UR QL SCN: NEGATIVE NG/ML
BENZODIAZ UR QL: NEGATIVE NG/ML
BZE UR QL: NEGATIVE NG/ML
CANNABINOIDS UR QL SCN: NEGATIVE NG/ML
OPIATES UR QL: NEGATIVE NG/ML
PCP UR QL: NEGATIVE NG/ML
SERVICE CMNT-IMP: NORMAL
VIT B12 SERPL-MCNC: 515 PG/ML (ref 193–986)

## 2020-04-25 PROCEDURE — 74011250637 HC RX REV CODE- 250/637: Performed by: NEUROLOGICAL SURGERY

## 2020-04-25 PROCEDURE — 93970 EXTREMITY STUDY: CPT

## 2020-04-25 PROCEDURE — 74011250637 HC RX REV CODE- 250/637: Performed by: INTERNAL MEDICINE

## 2020-04-25 PROCEDURE — 65660000000 HC RM CCU STEPDOWN

## 2020-04-25 PROCEDURE — 36415 COLL VENOUS BLD VENIPUNCTURE: CPT

## 2020-04-25 PROCEDURE — 74011250636 HC RX REV CODE- 250/636: Performed by: INTERNAL MEDICINE

## 2020-04-25 PROCEDURE — 82607 VITAMIN B-12: CPT

## 2020-04-25 RX ORDER — ERGOCALCIFEROL 1.25 MG/1
50000 CAPSULE ORAL
Status: DISCONTINUED | OUTPATIENT
Start: 2020-04-25 | End: 2020-05-04 | Stop reason: HOSPADM

## 2020-04-25 RX ORDER — ASPIRIN 81 MG/1
81 TABLET ORAL DAILY
Status: DISCONTINUED | OUTPATIENT
Start: 2020-04-25 | End: 2020-04-25

## 2020-04-25 RX ADMIN — ATORVASTATIN CALCIUM 40 MG: 40 TABLET, FILM COATED ORAL at 21:59

## 2020-04-25 RX ADMIN — FOLIC ACID 1 MG: 1 TABLET ORAL at 08:35

## 2020-04-25 RX ADMIN — ASPIRIN 81 MG: 81 TABLET ORAL at 16:27

## 2020-04-25 RX ADMIN — METOPROLOL TARTRATE 12.5 MG: 25 TABLET, FILM COATED ORAL at 21:59

## 2020-04-25 RX ADMIN — THIAMINE HCL TAB 100 MG 100 MG: 100 TAB at 08:36

## 2020-04-25 RX ADMIN — THERA TABS 1 TABLET: TAB at 08:36

## 2020-04-25 RX ADMIN — METOPROLOL TARTRATE 12.5 MG: 25 TABLET, FILM COATED ORAL at 08:35

## 2020-04-25 RX ADMIN — HEPARIN SODIUM 5000 UNITS: 5000 INJECTION INTRAVENOUS; SUBCUTANEOUS at 08:36

## 2020-04-25 RX ADMIN — HEPARIN SODIUM 5000 UNITS: 5000 INJECTION INTRAVENOUS; SUBCUTANEOUS at 16:27

## 2020-04-25 RX ADMIN — ERGOCALCIFEROL 50000 UNITS: 1.25 CAPSULE ORAL at 08:36

## 2020-04-25 NOTE — CONSULTS
Nico Camacho is a 47 y.o. male who was seen by synchronous (real-time) audio-video technology on 4/25/2020      Date of  Admission: 4/23/2020 12:46 PM     Admission type:Emergency    Consult for:  PFO, ? CVA  Consulted by: Dr. Dodie Peabody     Subjective:     Gallo Cortez is a 47 y.o. male admitted for unsteady gait. Dr. Brian Spencer of neurology consulted and felt that most of his symptoms were consistent with severe cervical spinal stenosis which was confirmed by imaging. His brain imaging did reveal a questionable small CVA in a very atypical location. It sounds like Dr. Dior Rodriguez did not feel that this was necessarily an active problem and that it was indeed questionable at all. For that he just recommended aspirin and statin. We are consulted because echocardiogram with bubble study revealed evidence of a patent foramen ovale by saline contrast study. We are consulted for management of PFO in the setting of questionable CVA. The patient denies chest pain/ shortness of breath, orthopnea, PND, LE edema, palpitations, syncope, or presyncope.       Patient Active Problem List    Diagnosis Date Noted    PFO (patent foramen ovale) 04/25/2020    Cervical spinal stenosis 04/25/2020    Alcohol abuse 04/24/2020    HTN (hypertension) 04/24/2020    Hypercholesteremia 04/24/2020    Ataxia 04/24/2020    Bilateral carotid artery stenosis 04/24/2020    Hx-TIA (transient ischemic attack) 04/24/2020    History of TIAs     Bilateral leg weakness     Vertigo 04/23/2020      None  Past Medical History:   Diagnosis Date    Alcohol abuse     Bilateral leg weakness     Developmental venous anomaly     History of TIAs        Social History     Socioeconomic History    Marital status: SINGLE     Spouse name: Not on file    Number of children: Not on file    Years of education: Not on file    Highest education level: Not on file   Tobacco Use    Smoking status: Never Smoker    Smokeless tobacco: Never Used   Substance and Sexual Activity    Alcohol use: Yes     Comment: social drinker    Drug use: No     No Known Allergies   Family History   Problem Relation Age of Onset    Lung Cancer Mother     COPD Mother     Lung Cancer Father     COPD Father       Current Facility-Administered Medications   Medication Dose Route Frequency    ergocalciferol capsule 50,000 Units  50,000 Units Oral Q7D    atorvastatin (LIPITOR) tablet 40 mg  40 mg Oral QHS    thiamine mononitrate (B-1) tablet 100 mg  100 mg Oral DAILY    folic acid (FOLVITE) tablet 1 mg  1 mg Oral DAILY    therapeutic multivitamin (THERAGRAN) tablet 1 Tab  1 Tab Oral DAILY    metoprolol tartrate (LOPRESSOR) tablet 12.5 mg  12.5 mg Oral Q12H    diazePAM (VALIUM) tablet 2.5 mg  2.5 mg Oral Q1H PRN    diazePAM (VALIUM) tablet 5 mg  5 mg Oral Q1H PRN    acetaminophen (TYLENOL) tablet 650 mg  650 mg Oral Q6H PRN    ondansetron (ZOFRAN) injection 4 mg  4 mg IntraVENous Q6H PRN    labetaloL (NORMODYNE;TRANDATE) injection 5 mg  5 mg IntraVENous Q10MIN PRN    heparin (porcine) injection 5,000 Units  5,000 Units SubCUTAneous Q8H         Review of Symptoms:  11 systems reviewed, negative other than as stated in HPI     Subjective:        Physical Exam:    Vitals:    04/25/20 0735 04/25/20 1125 04/25/20 1530 04/25/20 1830   BP: 149/80 (!) 151/101 (!) 154/98 (!) 151/95   Pulse: (!) 53 60 62 65   Resp: 16 16 16 16   Temp: 98.4 °F (36.9 °C) 98 °F (36.7 °C) 98.4 °F (36.9 °C) 96.8 °F (36 °C)   SpO2: 97% 98% 99% 100%   Weight:       Height:         Body mass index is 23.71 kg/m². General PE  Gen:  NAD  Mental Status - Alert. General Appearance - Not in acute distress. HEENT:  PER, EOM, no JVD  Chest and Lung Exam   Inspection: Accessory muscles - No use of accessory muscles in breathing. No audible wheezing. Normal effort in breathing. Symmetric excursion.   Cardiovascular:  No JVD  Upper Extremity: Inspection - Bilateral - No Cyanotic nailbeds or Digital clubbing. Lower Extremity:   Palpation: Edema - Bilateral - No edema. Neuro: A&O times 3, CN and motor grossly WNL  Skin: no rashes or lesions on exposed skin, dry, intact  Psych: normal mood, affect. Speech clear. Data Review:   Recent Labs     04/23/20  1326   WBC 3.7*   HGB 15.5   HCT 47.1        Recent Labs     04/23/20  1624 04/23/20  1326   NA  --  138   K  --  4.2   CL  --  101   CO2  --  30   GLU  --  94   BUN  --  7   CREA  --  0.94   CA  --  8.2*   ALB  --  4.1   TBILI  --  0.2   SGOT  --  29   ALT  --  28   INR 1.0  --        Recent Labs     04/24/20  0916 04/23/20  1326   TROIQ  --  <0.05     --        Lab Results   Component Value Date/Time    Cholesterol, total 204 (H) 04/24/2020 04:08 AM    HDL Cholesterol 109 04/24/2020 04:08 AM    LDL, calculated 83.4 04/24/2020 04:08 AM    VLDL, calculated 11.6 04/24/2020 04:08 AM    Triglyceride 58 04/24/2020 04:08 AM    CHOL/HDL Ratio 1.9 04/24/2020 04:08 AM           Intake/Output Summary (Last 24 hours) at 4/25/2020 1957  Last data filed at 4/25/2020 1144  Gross per 24 hour   Intake 120 ml   Output 450 ml   Net -330 ml        Cardiographics    Telemetry: will review tomorrow    ECG: normal EKG, normal sinus rhythm    Echocardiogram:  · Normal cavity size, systolic function (ejection fraction normal) and diastolic function. Moderate concentric hypertrophy. Estimated left ventricular ejection fraction is 55 - 60%. · There was a right to left shunt, with provocative maneuvers to increase right atrial pressure. · Mild mitral valve regurgitation is present. · Saline contrast was given to evaluate for intracardiac shunt. · Bubble study was positive.      Assessment:       Principal Problem:    Bilateral leg weakness ()    Active Problems:    Vertigo (4/23/2020)      Alcohol abuse (4/24/2020)      HTN (hypertension) (4/24/2020)      Hypercholesteremia (4/24/2020)      Ataxia (4/24/2020)      History of TIAs ()      Bilateral carotid artery stenosis (4/24/2020)      Hx-TIA (transient ischemic attack) (4/24/2020)      PFO (patent foramen ovale) (4/25/2020)      Overview: Echo 02/24/20 - positive bubble study       Cervical spinal stenosis (4/25/2020)         Plan:     Bianca Loco is a 47 y.o. male admitted for unsteady gait. Dr. Vanna Yu of neurology consulted and felt that most of his symptoms were consistent with severe cervical spinal stenosis which was confirmed by imaging. His brain imaging did reveal a questionable small CVA in a very atypical location. It sounds like Dr. Rao Figueroa did not feel that this was necessarily an active problem and that it was indeed questionable at all. For that he just recommended aspirin and statin. We are consulted because echocardiogram with bubble study revealed evidence of a patent foramen ovale by saline contrast study. We are consulted for management of PFO in the setting of questionable CVA. Check lower extremity venous Dopplers to rule out DVT which could be a source for paradoxical embolus. Low clinical suspicion with no leg pain or edema. I will discuss with the neurologist on call tomorrow whether he is concerned about evidence of any significant embolic stroke. If symptoms are most likely explained by cervical spinal stenosis and not felt to be consistent with any significant stroke, and if stroke is questionable, I would agree with aspirin and statin only, no indication for closure of PFO. If there is high suspicion for a potentially cardioembolic stroke via paradoxical embolism, the patient may qualify for PFO closure. Please recall the patent foramen ovale is patent in 25% of all patients, and in the average patient is more likely to be an innocent bystander than an actual cause of CVA. This virtual visit was conducted with audiovisual connection using [employer] services.     Pursuant to the emergency declaration under the [address] Emergencies Act, 1135 waiver authority and the Coronavirus Preparedness and Response Supplemental Appropriations Act, this Virtual Visit was conducted, with patient's consent, to reduce the patient's risk of exposure to COVID-19 and provide continuity of care for an established patient. Services were provided through a video synchronous discussion virtually to substitute for in-person visit. Received consult for questionable small CVA with PFO visible on transthoracic echocardiogram.  It appears per Dr. Jessica Del Angel note that the primary problem is cervical spinal stenosis and that the stroke is have questionable significance. Will discuss further with neurology. Will check for lower extremity DVTs, although he has no swelling or leg pain to suggest that.

## 2020-04-25 NOTE — PROGRESS NOTES
Progress Note          Pt Name  Mercedez Talavera   Date of Birth 1970   Medical Record Number  [id number]      Age  47 y.o. PCP None   Admit date:  4/23/2020    Room Number  3121/01  @ [facility]   Date of Service  4/25/2020     Admission Diagnoses:  Vertigo      History of present illness (copied from H&P)  \" 26-year-old -American male     Originally from [place], currently is homeless     Says he had a stroke in [facility] in January 2020  Presented with similar symptoms to today  Says he is not currently taking an aspirin or any prescription medications     Awoke this morning suddenly felt unsteady when he walked \"like I was staggering\"  Initially denied drinking but had an alcohol level of 208 in the emergency room  No headache  No speech changes or visual changes  No focal weakness but did admit to bilateral leg weakness  No sensory changes  No neck or back pain  No fevers chills, cough, shortness of breath, chest pain, nausea vomiting, diarrhea, abdominal pain     Called EMS because of the unsteady gait     Emergency room neuro exam nonfocal  Hemodynamically stable  Head CT scan negative  CTA head and neck with no large vessel occlusion and no significant vascular disease              Right frontal developmental venous anomaly  Tele neurology consulted, recommended admission for MRI and further evaluation  We will call \"       Assessment and plan:     Present on Admission:   Vertigo   Alcohol abuse   HTN (hypertension)   Hypercholesteremia   Ataxia   History of TIAs   Bilateral leg weakness   Bilateral carotid artery stenosis   Hx-TIA (transient ischemic attack)       Acute CVA  Presenting with   Ataxia / Vertigo   CIWA scores are normal.   Echo shows PFO with positive bubble study   Appreciate help and guidance from neurologist     Paraperesis   Pt reports improvement in symptoms      Alcohol abuse - Was acutely intoxicated on admit.   Unclear risk of withdrawal.  For now continue CIWA protocol with low dose PO ativan. Sz precautions. Cannot discharge as he may be high risk. Goody vitamins PO. Advise cessation.     HTN (hypertension) - Held meds for permissive HTN. Resume metoprolol today.     Hypercholesteremia - Continue atovastatin     Non compliance - Consult CM. He is homeless and could not afford his medications and has no follow up. Body mass index is 23.71 kg/m². -             CODE STATUS   Full   Functional Status  Pt resided in [facility] ; worked as a  at a [employer]       Surrogate decision maker:  His brother who resides in [address] [place] [address] Close   Discharge Plan:  SNF/LTC,  ?      Misc   Benefit:  Payor: [facility] MEDICAID / Plan: Brandon William / Product Type: [employer] Medicaid /    Isolation :  There are currently no Active Isolations   ADT status:  INPATIENT      Query   None noted today    Prognosis      Social issues  Date  Comment                        Subjective Data     \"I feel a lot better \"  Review of Systems - History obtained from the patient  Respiratory ROS: no cough, shortness of breath, or wheezing  Cardiovascular ROS: no chest pain or dyspnea on exertion  Neurological ROS: improved strength of both lower extremities     Objective Data       Comments  Pleasant gentleman lying in bed in no distress      Patient Vitals for the past 24 hrs:   BP   04/25/20 1125 (!) 151/101   04/25/20 0735 149/80   04/25/20 0345 155/88   04/24/20 2355 156/85   04/24/20 1949 156/84   04/24/20 1523 150/85      Patient Vitals for the past 24 hrs:   Pulse   04/25/20 1125 60   04/25/20 0735 (!) 53   04/25/20 0345 (!) 58   04/24/20 2355 63   04/24/20 1949 62   04/24/20 1523 61      Patient Vitals for the past 24 hrs:   Resp   04/25/20 1125 16   04/25/20 0735 16   04/25/20 0345 16   04/24/20 2355 16   04/24/20 1949 16   04/24/20 1523 16      Patient Vitals for the past 24 hrs:   Temp   04/25/20 1125 98 °F (36.7 °C)   04/25/20 0735 98.4 °F (36.9 °C)   04/25/20 0345 98.2 °F (36.8 °C)   04/24/20 2355 98.3 °F (36.8 °C)   04/24/20 1949 98.6 °F (37 °C)   04/24/20 1523 98 °F (36.7 °C)        SpO2 Readings from Last 6 Encounters:   04/25/20 98%          O2 Device: Room air Body mass index is 23.71 kg/m². -  Wt Readings from Last 10 Encounters:   04/24/20 77.1 kg (170 lb)   05/13/10 98.9 kg (218 lb 0.6 oz)        Physical Exam:             General:  Alert, cooperative,   well noursished,   well developed,   appears stated age    Ears/Eyes:  Hearing intact  Sclera anicteric.    Pupils equal   Mouth/Throat:  Mucous membranes normal pink and moist     Neck:     Lungs:  Trachea midline  Chest excursion symmetrical   Auscultation B/L Symmetrical with   Vesicular breath sounds     No Crepitations noted          CVS:  Regular rate and rhythm   no  murmur,   No click, rub or gallop  S1 normal   S2 normal      Abdomen:    Soft, non-tender  Bowel sounds normal     Extremities:    No cyanosis, jaundice  No edema noted   No sign of DVT/cord like lesion on palpation  No sign of acute trauma    Skin:    Skin color, texture, turgor normal. no acute rash or lesions    Lymph nodes:     Musculoskeletal Muscle bulk B/L symmetrical   Neuro Cranial nerves are intact,   motor movement b/l symmetrical,   Sensory evaluation b/l symmetrical    Psych:  Alert and oriented,   normal mood & affect          Medications reviewed     Current Facility-Administered Medications   Medication Dose Route Frequency    ergocalciferol capsule 50,000 Units  50,000 Units Oral Q7D    atorvastatin (LIPITOR) tablet 40 mg  40 mg Oral QHS    thiamine mononitrate (B-1) tablet 100 mg  100 mg Oral DAILY    folic acid (FOLVITE) tablet 1 mg  1 mg Oral DAILY    therapeutic multivitamin (THERAGRAN) tablet 1 Tab  1 Tab Oral DAILY    metoprolol tartrate (LOPRESSOR) tablet 12.5 mg  12.5 mg Oral Q12H    diazePAM (VALIUM) tablet 2.5 mg  2.5 mg Oral Q1H PRN    diazePAM (VALIUM) tablet 5 mg  5 mg Oral Q1H PRN    acetaminophen (TYLENOL) tablet 650 mg  650 mg Oral Q6H PRN    ondansetron (ZOFRAN) injection 4 mg  4 mg IntraVENous Q6H PRN    labetaloL (NORMODYNE;TRANDATE) injection 5 mg  5 mg IntraVENous Q10MIN PRN    heparin (porcine) injection 5,000 Units  5,000 Units SubCUTAneous Q8H       Relevant other informations: Other medical conditions listed in [facility] hospital problem list section; all of these and other pertinent data were taken into consideration when treatment plan is developed and customized to this patient's unique overall circumstances and needs. We have reviewed available old medical records within the constraints of this admission process. Data Review:   Recent Days:  All Micro Results     Procedure Component Value Units Date/Time    CULTURE, URINE [[id number]] Collected:  04/24/20 1113    Order Status:  Completed Specimen:  Urine from Clean catch Updated:  04/24/20 1746          Recent Labs     04/23/20  1326   WBC 3.7*   HGB 15.5   HCT 47.1        Recent Labs     04/23/20  1624 04/23/20  1326   NA  --  138   K  --  4.2   CL  --  101   CO2  --  30   GLU  --  94   BUN  --  7   CREA  --  0.94   CA  --  8.2*   ALB  --  4.1   TBILI  --  0.2   SGOT  --  29   ALT  --  28   INR 1.0  --       Lab Results   Component Value Date/Time    TSH 3.69 04/24/2020 09:16 AM            Care Plan discussed with:Patient/Family and Nurse   Other medical conditions are listed in the active hospital problem list section; these and other pertinent data were taken into consideration when the treatment plan was developed and customized to this patient's unique overall circumstances and needs. High complexity decision making was performed for this patient who is at high risk for decompensation with multiple organ involvement.    Today total floor/unit time was 35 minutes while caring for this patient and greater than 50% of that time was spent with patient (and/or family) coordinating patients clinical issues; this includes time spent during multidisciplinary rounds.         Toni Jefferson MD MPH FACP    4/25/2020

## 2020-04-25 NOTE — PROGRESS NOTES
Pt seen and examined. Consult dictated. 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 Will send b12. Will need surgical decompression. Feels better since admission.

## 2020-04-25 NOTE — PROGRESS NOTES
Bedside and Verbal shift change report given to Sherif (oncoming nurse) by Samuel Nelson (offgoing nurse). Report included the following information SBAR, Kardex, Intake/Output and MAR.     Zone Phone:   [phone]        Significant changes during shift:  none           Patient Information     Rupinder Marquez  47 y.o.  4/23/2020 12:46 PM by Marsha Lopez MD. Rupinder Marquez was admitted from Home     Problem List          Patient Active Problem List     Diagnosis Date Noted    Alcohol abuse 04/24/2020    HTN (hypertension) 04/24/2020    Hypercholesteremia 04/24/2020    Ataxia 04/24/2020    Bilateral carotid artery stenosis 04/24/2020    Hx-TIA (transient ischemic attack) 04/24/2020    History of TIAs      Bilateral leg weakness      Vertigo 04/23/2020           Past Medical History:   Diagnosis Date    Alcohol abuse      Bilateral leg weakness      Developmental venous anomaly      History of TIAs              Core Measures:     CVA: Yes Yes     Activity Status:     OOB to Chair Yes  Ambulated this shift Yes   Bed Rest No     DVT prophylaxis:     DVT prophylaxis Med- Yes  DVT prophylaxis SCD or CARMITA- No      Wounds: (If Applicable)     Wounds- No     Patient Safety:     Falls Score Total Score: 3  Safety Level_______  Bed Alarm On? Yes  Sitter?  No     Plan for upcoming shift: safety, neuro checks,            Discharge Plan: Yes when ready     Active Consults:  IP CONSULT TO NEUROLOGY  IP CONSULT TO NEUROSURGERY

## 2020-04-26 PROCEDURE — 65660000000 HC RM CCU STEPDOWN

## 2020-04-26 PROCEDURE — 74011250637 HC RX REV CODE- 250/637: Performed by: EMERGENCY MEDICINE

## 2020-04-26 PROCEDURE — 74011250636 HC RX REV CODE- 250/636: Performed by: INTERNAL MEDICINE

## 2020-04-26 PROCEDURE — 74011250637 HC RX REV CODE- 250/637: Performed by: INTERNAL MEDICINE

## 2020-04-26 RX ADMIN — METOPROLOL TARTRATE 12.5 MG: 25 TABLET, FILM COATED ORAL at 08:56

## 2020-04-26 RX ADMIN — FOLIC ACID 1 MG: 1 TABLET ORAL at 08:56

## 2020-04-26 RX ADMIN — METOPROLOL TARTRATE 12.5 MG: 25 TABLET, FILM COATED ORAL at 22:55

## 2020-04-26 RX ADMIN — ACETAMINOPHEN 650 MG: 325 TABLET ORAL at 10:47

## 2020-04-26 RX ADMIN — HEPARIN SODIUM 5000 UNITS: 5000 INJECTION INTRAVENOUS; SUBCUTANEOUS at 17:07

## 2020-04-26 RX ADMIN — ATORVASTATIN CALCIUM 40 MG: 40 TABLET, FILM COATED ORAL at 22:55

## 2020-04-26 RX ADMIN — THERA TABS 1 TABLET: TAB at 08:56

## 2020-04-26 RX ADMIN — HEPARIN SODIUM 5000 UNITS: 5000 INJECTION INTRAVENOUS; SUBCUTANEOUS at 08:56

## 2020-04-26 RX ADMIN — HEPARIN SODIUM 5000 UNITS: 5000 INJECTION INTRAVENOUS; SUBCUTANEOUS at 00:47

## 2020-04-26 RX ADMIN — THIAMINE HCL TAB 100 MG 100 MG: 100 TAB at 08:56

## 2020-04-26 NOTE — ROUTINE PROCESS
Bedside and Verbal shift change report given to [facility] by Kylah (offgoing nurse). Report included the following information SBAR, Kardex, Intake/Output and MAR. 
  
Zone Phone:   [phone] 
  
  
Significant changes during shift:  one episode of dizziness, lightheadedness and HA 
  
  
  
Patient Information 
  
Yevgeniy Forrest 
[address] y.o. 
4/23/2020 12:46 PM by Angelo Richards MD. Nick JADA Iain Hurst admitted from Home 
  
Problem List 
  
       
Patient Active Problem List  
  Diagnosis Date Noted  Alcohol abuse 04/24/2020  
 HTN (hypertension) 04/24/2020  Hypercholesteremia 04/24/2020  Ataxia 04/24/2020  Bilateral carotid artery stenosis 04/24/2020  
 Hx-TIA (transient ischemic attack) 04/24/2020  
 History of TIAs    
 Bilateral leg weakness    
 Vertigo 04/23/2020  
  
       
Past Medical History:  
Diagnosis Date  Alcohol abuse    
 Bilateral leg weakness    
 Developmental venous anomaly    
 History of TIAs    
  
  
  
Core Measures: 
  
CVA: Yes Yes 
  
Activity Status: 
  
OOB to Mazree Ambulated this shift Yes  
Bed Rest No 
  
DVT prophylaxis: 
  
DVT prophylaxis Med- Yes DVT prophylaxis SCD or CARMITA- No  
  
Wounds: (If Applicable) 
  
Wounds- No 
  
Patient Safety: 
  
Falls Score Total Score: 3 Safety Level_______ Bed Alarm On? Yes Sitter? No 
  
Plan for upcoming shift: safety, neuro checks,  
  
  
  
Discharge Plan: Yes when ready 
  
Active Consults: 
IP CONSULT TO NEUROLOGY 
IP CONSULT TO NEUROSURGERY

## 2020-04-26 NOTE — PROGRESS NOTES
Problem: Falls - Risk of  Goal: *Absence of Falls  Description: Document Brian Rangel Fall Risk and appropriate interventions in the flowsheet.   Outcome: Progressing Towards Goal  Note: Fall Risk Interventions:  Mobility Interventions: Bed/chair exit alarm         Medication Interventions: Bed/chair exit alarm    Elimination Interventions: Call light in reach    History of Falls Interventions: Bed/chair exit alarm         Problem: Patient Education: Go to Patient Education Activity  Goal: Patient/Family Education  Outcome: Progressing Towards Goal

## 2020-04-26 NOTE — PROGRESS NOTES
Progress Note          Pt Name  Marielena Crabtree   Date of Birth 1970   Medical Record Number  [id number]      Age  47 y.o. PCP None   Admit date:  4/23/2020    Room Number  3121/01  @ [facility]   Date of Service  4/26/2020     Admission Diagnoses:  Vertigo      History of present illness (copied from H&P)  \" 58-year-old -American male     Originally from [place], currently is homeless     Says he had a stroke in [facility] in January 2020  Presented with similar symptoms to today  Says he is not currently taking an aspirin or any prescription medications     Awoke this morning suddenly felt unsteady when he walked \"like I was staggering\"  Initially denied drinking but had an alcohol level of 208 in the emergency room  No headache  No speech changes or visual changes  No focal weakness but did admit to bilateral leg weakness  No sensory changes  No neck or back pain  No fevers chills, cough, shortness of breath, chest pain, nausea vomiting, diarrhea, abdominal pain     Called EMS because of the unsteady gait     Emergency room neuro exam nonfocal  Hemodynamically stable  Head CT scan negative  CTA head and neck with no large vessel occlusion and no significant vascular disease              Right frontal developmental venous anomaly  Tele neurology consulted, recommended admission for MRI and further evaluation  We will call \"       Assessment and plan:      Acute CVA  Presenting with   Ataxia / Vertigo   CIWA scores are normal.   Echo shows PFO with positive bubble study - appreciate guidance from Dr. Mojgan Martinez help and guidance from neurologist     Paraperesis   Pt reports improvement in symptoms      Alcohol abuse - Was acutely intoxicated on admit. Unclear risk of withdrawal.  For now continue CIWA protocol with low dose PO ativan. Sz precautions. Cannot discharge as he may be high risk. Goody vitamins PO.  Advise cessation.     HTN (hypertension) - Held meds for permissive HTN. Resume metoprolol today.     Hypercholesteremia - Continue atovastatin     Non compliance - Consult CM. He is homeless and could not afford his medications and has no follow up. Present on Admission:   Vertigo   Alcohol abuse   HTN (hypertension)   Hypercholesteremia   Ataxia   History of TIAs   Bilateral leg weakness   Bilateral carotid artery stenosis   Hx-TIA (transient ischemic attack)   PFO (patent foramen ovale)        Body mass index is 23.71 kg/m². -             CODE STATUS   Full   Functional Status  Pt resided in [facility] ; worked as a  at a [employer]       Surrogate decision maker:  His brother who resides in [address] [place] [address]   Discharge Plan:  SNF/LTC,  ?      Misc   Benefit:  Payor: [facility] MEDICAID / Plan: [employer] / Product Type: [employer] Medicaid /    Isolation :  There are currently no Active Isolations   ADT status:  INPATIENT      Query   None noted today    Prognosis      Social issues  Date  Comment                        Subjective Data     \"I feel a lot better \"  Review of Systems - History obtained from the patient  Respiratory ROS: no cough, shortness of breath, or wheezing  Cardiovascular ROS: no chest pain or dyspnea on exertion  Neurological ROS: improved strength of both lower extremities     Objective Data       Comments  Pleasant gentleman lying in bed in no distress      Patient Vitals for the past 24 hrs:   BP   04/26/20 1122 144/88   04/26/20 1037 (!) 145/92   04/26/20 0748 124/75   04/26/20 0028 131/89   04/25/20 1830 (!) 151/95      Patient Vitals for the past 24 hrs:   Pulse   04/26/20 1122 (!) 59   04/26/20 1037 (!) 57   04/26/20 0748 65   04/26/20 0028 (!) 55   04/25/20 1830 65      Patient Vitals for the past 24 hrs:   Resp   04/26/20 1122 18   04/26/20 1037 18   04/26/20 0748 16   04/26/20 0028 16   04/25/20 1830 16      Patient Vitals for the past 24 hrs:   Temp 04/26/20 1122 98.4 °F (36.9 °C)   04/26/20 1037 98 °F (36.7 °C)   04/26/20 0748 98.1 °F (36.7 °C)   04/26/20 0028 98.2 °F (36.8 °C)   04/25/20 1830 96.8 °F (36 °C)        SpO2 Readings from Last 6 Encounters:   04/26/20 97%          O2 Device: Room air Body mass index is 23.71 kg/m². -  Wt Readings from Last 10 Encounters:   04/24/20 77.1 kg (170 lb)   05/13/10 98.9 kg (218 lb 0.6 oz)        Physical Exam:             General:  Alert, cooperative,   well noursished,   well developed,   appears stated age    Ears/Eyes:  Hearing intact  Sclera anicteric.    Pupils equal   Mouth/Throat:  Mucous membranes normal pink and moist     Neck:     Lungs:  Trachea midline  Chest excursion symmetrical   Auscultation B/L Symmetrical with   Vesicular breath sounds     No Crepitations noted          CVS:  Regular rate and rhythm   no  murmur,   No click, rub or gallop  S1 normal   S2 normal      Abdomen:    Soft, non-tender  Bowel sounds normal     Extremities:    No cyanosis, jaundice  No edema noted   No sign of DVT/cord like lesion on palpation  No sign of acute trauma    Skin:    Skin color, texture, turgor normal. no acute rash or lesions    Lymph nodes:     Musculoskeletal Muscle bulk B/L symmetrical   Neuro Cranial nerves are intact,   motor movement b/l symmetrical,   Sensory evaluation b/l symmetrical    Psych:  Alert and oriented,   normal mood & affect          Medications reviewed     Current Facility-Administered Medications   Medication Dose Route Frequency    ergocalciferol capsule 50,000 Units  50,000 Units Oral Q7D    atorvastatin (LIPITOR) tablet 40 mg  40 mg Oral QHS    thiamine mononitrate (B-1) tablet 100 mg  100 mg Oral DAILY    folic acid (FOLVITE) tablet 1 mg  1 mg Oral DAILY    therapeutic multivitamin (THERAGRAN) tablet 1 Tab  1 Tab Oral DAILY    metoprolol tartrate (LOPRESSOR) tablet 12.5 mg  12.5 mg Oral Q12H    diazePAM (VALIUM) tablet 2.5 mg  2.5 mg Oral Q1H PRN    diazePAM (VALIUM) tablet 5 mg  5 mg Oral Q1H PRN    acetaminophen (TYLENOL) tablet 650 mg  650 mg Oral Q6H PRN    ondansetron (ZOFRAN) injection 4 mg  4 mg IntraVENous Q6H PRN    labetaloL (NORMODYNE;TRANDATE) injection 5 mg  5 mg IntraVENous Q10MIN PRN    heparin (porcine) injection 5,000 Units  5,000 Units SubCUTAneous Q8H       Relevant other informations: Other medical conditions listed in [facility] problem list section; all of these and other pertinent data were taken into consideration when treatment plan is developed and customized to this patient's unique overall circumstances and needs. We have reviewed available old medical records within the constraints of this admission process. Data Review:   Recent Days:  All Micro Results     Procedure Component Value Units Date/Time    CULTURE, URINE [[id number]] Collected:  04/24/20 1113    Order Status:  Completed Specimen:  Urine from Clean catch Updated:  04/25/20 1449     Special Requests: NO SPECIAL REQUESTS        Culture result: No growth (<1,000 CFU/ML)             No results for input(s): WBC, HGB, HCT, PLT, HGBEXT, HCTEXT, PLTEXT, HGBEXT, HCTEXT, PLTEXT in the last 72 hours. Recent Labs     04/23/20  1624   INR 1.0      Lab Results   Component Value Date/Time    TSH 3.69 04/24/2020 09:16 AM            Care Plan discussed with:Patient/Family and Nurse   Other medical conditions are listed in the active hospital problem list section; these and other pertinent data were taken into consideration when the treatment plan was developed and customized to this patient's unique overall circumstances and needs. High complexity decision making was performed for this patient who is at high risk for decompensation with multiple organ involvement.    Today total floor/unit time was 35 minutes while caring for this patient and greater than 50% of that time was spent with patient (and/or family) coordinating patients clinical issues; this includes time spent during multidisciplinary rounds.         Laura Arriaza MD MPH FACP    4/26/2020
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
## 2020-04-26 NOTE — PROGRESS NOTES
Teresa Ma is a 47 y.o. male who was seen by synchronous (real-time) audio-video technology on 4/26/2020 4/26/2020 11:21 AM    Admit Date: 4/23/2020    Admit Diagnosis:   Vertigo [R42]; Vertigo [R42]    Subjective:     Teresa Ma denies chest pain or shortness of breath. Rhythm NSR. Current Facility-Administered Medications   Medication Dose Route Frequency    ergocalciferol capsule 50,000 Units  50,000 Units Oral Q7D    atorvastatin (LIPITOR) tablet 40 mg  40 mg Oral QHS    thiamine mononitrate (B-1) tablet 100 mg  100 mg Oral DAILY    folic acid (FOLVITE) tablet 1 mg  1 mg Oral DAILY    therapeutic multivitamin (THERAGRAN) tablet 1 Tab  1 Tab Oral DAILY    metoprolol tartrate (LOPRESSOR) tablet 12.5 mg  12.5 mg Oral Q12H    diazePAM (VALIUM) tablet 2.5 mg  2.5 mg Oral Q1H PRN    diazePAM (VALIUM) tablet 5 mg  5 mg Oral Q1H PRN    acetaminophen (TYLENOL) tablet 650 mg  650 mg Oral Q6H PRN    ondansetron (ZOFRAN) injection 4 mg  4 mg IntraVENous Q6H PRN    labetaloL (NORMODYNE;TRANDATE) injection 5 mg  5 mg IntraVENous Q10MIN PRN    heparin (porcine) injection 5,000 Units  5,000 Units SubCUTAneous Q8H         Objective:      Physical Exam:    Vitals:    04/25/20 1830 04/26/20 0028 04/26/20 0748 04/26/20 1037   BP: (!) 151/95 131/89 124/75 (!) 145/92   Pulse: 65 (!) 55 65 (!) 57   Resp: 16 16 16 18   Temp: 96.8 °F (36 °C) 98.2 °F (36.8 °C) 98.1 °F (36.7 °C) 98 °F (36.7 °C)   SpO2: 100% 96% 100% 99%   Weight:       Height:         Body mass index is 23.71 kg/m². General PE  Gen:  NAD  Mental Status - Alert. General Appearance - Not in acute distress. HEENT:  PER, EOM, no JVD  Chest and Lung Exam   Inspection: Accessory muscles - No use of accessory muscles in breathing. No audible wheezing. Normal effort in breathing. Symmetric excursion. Cardiovascular:  No JVD  Upper Extremity: Inspection - Bilateral - No Cyanotic nailbeds or Digital clubbing.    Lower Extremity:   Palpation: Edema - Bilateral - No edema. Neuro: A&O times 3, CN and motor grossly WNL  Skin: no rashes or lesions on exposed skin, dry, intact  Psych: normal mood, affect. Speech clear. Data Review:   Recent Labs     04/23/20  1326   WBC 3.7*   HGB 15.5   HCT 47.1        Recent Labs     04/23/20  1624 04/23/20  1326   NA  --  138   K  --  4.2   CL  --  101   CO2  --  30   GLU  --  94   BUN  --  7   CREA  --  0.94   CA  --  8.2*   ALB  --  4.1   TBILI  --  0.2   SGOT  --  29   ALT  --  28   INR 1.0  --        Recent Labs     04/24/20  0916 04/23/20  1326   TROIQ  --  <0.05     --          Intake/Output Summary (Last 24 hours) at 4/26/2020 1121  Last data filed at 4/25/2020 1144  Gross per 24 hour   Intake    Output 125 ml   Net -125 ml        Telemetry: normal sinus rhythm    Assessment:     Principal Problem:    Bilateral leg weakness ()    Active Problems:    Vertigo (4/23/2020)      Alcohol abuse (4/24/2020)      HTN (hypertension) (4/24/2020)      Hypercholesteremia (4/24/2020)      Ataxia (4/24/2020)      History of TIAs ()      Bilateral carotid artery stenosis (4/24/2020)      Hx-TIA (transient ischemic attack) (4/24/2020)      PFO (patent foramen ovale) (4/25/2020)      Overview: Echo 02/24/20 - positive bubble study       Cervical spinal stenosis (4/25/2020)        Plan:     David Morton is a 47 y.o. male admitted for unsteady gait. Dr. Cecilia Rodriguez of neurology consulted and felt that most of his symptoms were consistent with severe cervical spinal stenosis which was confirmed by imaging. His brain imaging did reveal a small CVA in an atypical location. Lower extremity venous Dopplers ruled out DVT.    I will discuss with the neurologist on call tomorrow whether he is concerned about evidence of any significant embolic stroke. I spoke with Dr. Belkis Hall who reviewed imaging and feels the CVA appears real and may warrant consideration of PFO closure. Discussed with Dr. Pema Mccracken.   Continue aspirin and statin in the meantime, no additional anticoagulation needed at this time, and follow-up for virtual visit with Dr. Sanjeev Pride in the near future. Thanks for the consult. We appreciate the opportunity to participate in this patient's care. Follow-up with Dr. Sanjeev Pride within 2 weeks, virtual visit. Added to discharge instructions. Please call if we can assist further during this hospitalization. This virtual visit was conducted with audiovisual connection using [place] [address] telemedicine services. Pursuant to the emergency declaration under the [facility], [facility] waiver authority and the [employer] and [employer] Act, this Virtual Visit was conducted, with patient's consent, to reduce the patient's risk of exposure to COVID-19 and provide continuity of care for an established patient. Services were provided through a video synchronous discussion virtually to substitute for in-person visit.

## 2020-04-26 NOTE — PROGRESS NOTES
Bedside and Verbal shift change report given to Kylah RN (oncoming nurse) by [employer] nurse).  Report included the following information SBAR, Kardex, Intake/Output and MAR.     Zone Phone:   [phone]        Significant changes during shift:  none           Patient Information     Chong Jolley  47 y.o.  4/23/2020 12:46 PM by Damián Norton MD. [facility] admitted from Home     Problem List             Patient Active Problem List     Diagnosis Date Noted    Alcohol abuse 04/24/2020    HTN (hypertension) 04/24/2020    Hypercholesteremia 04/24/2020    Ataxia 04/24/2020    Bilateral carotid artery stenosis 04/24/2020    Hx-TIA (transient ischemic attack) 04/24/2020    History of TIAs      Bilateral leg weakness      Vertigo 04/23/2020              Past Medical History:   Diagnosis Date    Alcohol abuse      Bilateral leg weakness      Developmental venous anomaly      History of TIAs              Core Measures:     CVA: Yes Yes     Activity Status:     OOB to Chair Yes  Ambulated this shift Yes   Bed Rest No     DVT prophylaxis:     DVT prophylaxis Med- Yes  DVT prophylaxis SCD or CARMITA- No      Wounds: (If Applicable)     Wounds- No     Patient Safety:     Falls Score Total Score: 3  Safety Level_______  Bed Alarm On? Yes  Sitter? No     Plan for upcoming shift: safety, neuro checks,            Discharge Plan: Yes when ready     Active Consults:  IP CONSULT TO NEUROLOGY  IP CONSULT TO NEUROSURGERY

## 2020-04-26 NOTE — PROGRESS NOTES
YVONNE Plan:  1)         [facility] - referral to be sent once discharge summary available  2)         [facility] if not accepted to [facility]  3)         Patient has active Medicaid to pay for all medications  4)         Patient will need transportation at discharge    CM acknowledges consult re: can't afford medication or follow up. Per chart review, pt has Medicaid. No further identified CM needs at this time. Referral to [facility] to be placed once discharge summary is available. CM will continue to remain available for support, discharge planning as needed.      Paula Lindsay, MSW, LSW  Supervisee in Social Work  [facility]  [phone]

## 2020-04-27 LAB
IGA SERPL-MCNC: 519 MG/DL (ref 90–386)
IGG SERPL-MCNC: 1226 MG/DL (ref 603–1613)
IGM SERPL-MCNC: 175 MG/DL (ref 20–172)
PROT PATTERN SERPL IFE-IMP: ABNORMAL

## 2020-04-27 PROCEDURE — 74011250637 HC RX REV CODE- 250/637: Performed by: INTERNAL MEDICINE

## 2020-04-27 PROCEDURE — 74011250636 HC RX REV CODE- 250/636: Performed by: INTERNAL MEDICINE

## 2020-04-27 PROCEDURE — 97116 GAIT TRAINING THERAPY: CPT | Performed by: PHYSICAL THERAPIST

## 2020-04-27 PROCEDURE — 65660000000 HC RM CCU STEPDOWN

## 2020-04-27 PROCEDURE — 97535 SELF CARE MNGMENT TRAINING: CPT | Performed by: OCCUPATIONAL THERAPIST

## 2020-04-27 RX ORDER — GUAIFENESIN 100 MG/5ML
81 LIQUID (ML) ORAL DAILY
Status: DISCONTINUED | OUTPATIENT
Start: 2020-04-27 | End: 2020-05-04 | Stop reason: HOSPADM

## 2020-04-27 RX ADMIN — HEPARIN SODIUM 5000 UNITS: 5000 INJECTION INTRAVENOUS; SUBCUTANEOUS at 18:14

## 2020-04-27 RX ADMIN — THERA TABS 1 TABLET: TAB at 10:10

## 2020-04-27 RX ADMIN — METOPROLOL TARTRATE 12.5 MG: 25 TABLET, FILM COATED ORAL at 23:12

## 2020-04-27 RX ADMIN — ASPIRIN 81 MG 81 MG: 81 TABLET ORAL at 12:17

## 2020-04-27 RX ADMIN — ATORVASTATIN CALCIUM 40 MG: 40 TABLET, FILM COATED ORAL at 23:12

## 2020-04-27 RX ADMIN — HEPARIN SODIUM 5000 UNITS: 5000 INJECTION INTRAVENOUS; SUBCUTANEOUS at 02:40

## 2020-04-27 RX ADMIN — HEPARIN SODIUM 5000 UNITS: 5000 INJECTION INTRAVENOUS; SUBCUTANEOUS at 10:10

## 2020-04-27 RX ADMIN — THIAMINE HCL TAB 100 MG 100 MG: 100 TAB at 10:10

## 2020-04-27 RX ADMIN — FOLIC ACID 1 MG: 1 TABLET ORAL at 10:10

## 2020-04-27 NOTE — PROGRESS NOTES
Follow-up Visit    Name Peggy Medley Age 47 y.o. MRN [id number]  1970       Chief Complaint:  stroke    Continues to have trouble walking. Discussed the plan for him after being discharged from the hospital.  Explained the significance of his PFO as well as his cervical stenosis. He reports that his walking has improved since admission. Assesment and Plan    1. Stroke  Risk factors for stroke include hyperlipidemia, hypertension, alcohol use and PFO. Discussed with Dr. Josh Hebert . He would likely benefit from non urgent closure of possible PFO. Continue aspirin      2. Cervical myelopathy  Would recommend postponing cervical decompression for at least 3 months    3. Hyperlipidemia  Continue atorvastatin    4. Ataxia  Continue physical therapy  Would benefit from inpatient physical therapy    5. Alcohol dependence  Continue thiamine    Allergies  Patient has no known allergies.      Medications  Current Facility-Administered Medications   Medication Dose Route Frequency    aspirin chewable tablet 81 mg  81 mg Oral DAILY    ergocalciferol capsule 50,000 Units  50,000 Units Oral Q7D    atorvastatin (LIPITOR) tablet 40 mg  40 mg Oral QHS    thiamine mononitrate (B-1) tablet 100 mg  100 mg Oral DAILY    folic acid (FOLVITE) tablet 1 mg  1 mg Oral DAILY    therapeutic multivitamin (THERAGRAN) tablet 1 Tab  1 Tab Oral DAILY    metoprolol tartrate (LOPRESSOR) tablet 12.5 mg  12.5 mg Oral Q12H    diazePAM (VALIUM) tablet 2.5 mg  2.5 mg Oral Q1H PRN    diazePAM (VALIUM) tablet 5 mg  5 mg Oral Q1H PRN    acetaminophen (TYLENOL) tablet 650 mg  650 mg Oral Q6H PRN    ondansetron (ZOFRAN) injection 4 mg  4 mg IntraVENous Q6H PRN    labetaloL (NORMODYNE;TRANDATE) injection 5 mg  5 mg IntraVENous Q10MIN PRN    heparin (porcine) injection 5,000 Units  5,000 Units SubCUTAneous Q8H        Medical History  Past Medical History:   Diagnosis Date    Alcohol abuse     Bilateral leg weakness     Developmental venous anomaly     History of TIAs        Review of Systems   Constitutional: Negative for chills and fever. HENT: Negative for ear pain. Eyes: Negative for pain and discharge. Respiratory: Negative for cough and hemoptysis. Cardiovascular: Negative for chest pain and claudication. Gastrointestinal: Negative for constipation and diarrhea. Genitourinary: Negative for flank pain and hematuria. Musculoskeletal: Negative for back pain and myalgias. Skin: Negative for itching and rash. Neurological: Positive for sensory change and weakness. Negative for headaches. Endo/Heme/Allergies: Negative for environmental allergies. Does not bruise/bleed easily. Psychiatric/Behavioral: Negative for depression and hallucinations. Exam:  Visit Vitals  /73   Pulse 84   Temp 98.4 °F (36.9 °C)   Resp 16   Ht 5' 11\" (1.803 m)   Wt 170 lb (77.1 kg)   SpO2 99%   BMI 23.71 kg/m²        General: Well developed, well nourished. Patient in no apparent distress   Head: Normocephalic, atraumatic, anicteric sclera   Neck Normal ROM, No thyromegally   Lungs:  Clear to auscultation bilaterally, No wheezes or rubs   Cardiac: Regular rate and rhythm with no murmurs. Abd: Bowel sounds were audible. No tenderness on palpation   Ext: No pedal edema   Skin: Supple no rash     NeurologicExam:  Mental Status: Alert and oriented to person place and time   Speech: Fluent no aphasia or dysarthria. Cranial Nerves:  II - XII Intact   Motor:  Full and symmetric strength of upper and lower ext    Reflexes:   Deep tendon reflexes 3+/4 lower extremities no clonus. Sensory:   Symmetric and intact    Gait:  Gait is ataxic   Tremor:   No tremor noted. Cerebellar:  Has difficult with heel over shin . Neurovascular: No carotid bruits.  No JVD          Lab Review  Lab Results   Component Value Date/Time    WBC 3.7 (L) 04/23/2020 01:26 PM    HCT 47.1 04/23/2020 01:26 PM    HGB 15.5 04/23/2020 01:26 PM PLATELET 947 96/26/9726 01:26 PM       Lab Results   Component Value Date/Time    Sodium 138 04/23/2020 01:26 PM    Potassium 4.2 04/23/2020 01:26 PM    Chloride 101 04/23/2020 01:26 PM    CO2 30 04/23/2020 01:26 PM    Glucose 94 04/23/2020 01:26 PM    BUN 7 04/23/2020 01:26 PM    Creatinine 0.94 04/23/2020 01:26 PM    Calcium 8.2 (L) 04/23/2020 01:26 PM         Lab Results   Component Value Date/Time    Hemoglobin A1c 5.8 (H) 04/24/2020 04:08 AM        Lab Results   Component Value Date/Time    Vitamin B12 515 04/25/2020 05:34 PM    Folate 15.1 04/24/2020 09:16 AM       Lab Results   Component Value Date/Time    Cholesterol, total 204 (H) 04/24/2020 04:08 AM    HDL Cholesterol 109 04/24/2020 04:08 AM    LDL, calculated 83.4 04/24/2020 04:08 AM    VLDL, calculated 11.6 04/24/2020 04:08 AM    Triglyceride 58 04/24/2020 04:08 AM    CHOL/HDL Ratio 1.9 04/24/2020 04:08 AM

## 2020-04-27 NOTE — PROGRESS NOTES
Problem: Mobility Impaired (Adult and Pediatric)  Goal: *Acute Goals and Plan of Care (Insert Text)  Description:   FUNCTIONAL STATUS PRIOR TO ADMISSION: Patient is homeless, unclear where he had been sleeping and does not have a car. Patient reports he ambulated without AD up until day of hospitalization. HOME SUPPORT PRIOR TO ADMISSION: Patient report no friends or family to provide assistance. Physical Therapy Goals  Initiated 4/24/2020    1. Patient will transfer from bed to chair and chair to bed with supervision/set-up using the least restrictive device within 7 day(s). 2.  Patient will perform sit to stand with supervision/set-up within 7 day(s). 3.  Patient will ambulate with supervision/set-up for 200 feet with the least restrictive device within 7 day(s). 4.  Patient will improve Monson Balance score by 7 points within 7 days. Outcome: Progressing Towards Goal   PHYSICAL THERAPY TREATMENT  Patient: Jessica Krishnamurthy (48 y.o. male)  Date: 4/27/2020  Diagnosis: Vertigo [R42]  Vertigo [R42]   Bilateral leg weakness       Precautions: Seizure  Chart, physical therapy assessment, plan of care and goals were reviewed. ASSESSMENT  Patient continues with skilled PT services and is progressing towards goals. Patient limited today by reports of \"light headedness\" but otherwise moving fairly well. No difficulty with bed mobility but needs CGA for ambulation using RW. Initially without difficulty ambulating but reports feelings of \"light headedness\" assisted through walk and requires return to room. BP assessed and WNLs and does not appear to be orthostatic. Suggest patient spend more time out of bed and mobilize as tolerate with nursing. Current Level of Function Impacting Discharge (mobility/balance):  CGA for amb with RW    Other factors to consider for discharge: at risk for falls, currently homeless, has no assist/support at home         PLAN :  Patient continues to benefit from skilled intervention to address the above impairments. Continue treatment per established plan of care. to address goals. Recommendation for discharge: (in order for the patient to meet his/her long term goals)  Physical therapy at least 2 days/week in the home vs none pending progress or DC disposition      IF patient discharges home will need the following DME: rolling walker       SUBJECTIVE:   Patient stated I was feeling fine until we started walking.     OBJECTIVE DATA SUMMARY:   Critical Behavior:  Neurologic State: Alert, Appropriate for age  Orientation Level: Oriented X4  Cognition: Follows commands  Safety/Judgement: Awareness of environment, Fall prevention, Insight into deficits  Functional Mobility Training:  Bed Mobility:  Rolling: Modified independent  Supine to Sit: Modified independent              Transfers:  Sit to Stand: Contact guard assistance  Stand to Sit: Contact guard assistance                             Balance:  Standing: Impaired  Standing - Static: Constant support;Good  Standing - Dynamic : Constant support; Fair  Ambulation/Gait Training:  Distance (ft): 150 Feet (ft)  Assistive Device: Gait belt;Walker, rolling  Ambulation - Level of Assistance: Contact guard assistance        Gait Abnormalities: Decreased step clearance        Base of Support: Narrowed     Speed/Vandana: Pace decreased (<100 feet/min); Slow  Step Length: Left shortened;Right shortened       Pain Rating:  No c/o pain    Activity Tolerance:   Fair and requires rest breaks  Please refer to the flowsheet for vital signs taken during this treatment. After treatment patient left in no apparent distress:   Sitting in chair, Call bell within reach, chair alarm on    COMMUNICATION/COLLABORATION:   The patients plan of care was discussed with: Physical therapist, Occupational therapist, and Registered nurse.      Lucio Arciniega PT, DPT   Time Calculation: 18 mins

## 2020-04-27 NOTE — PROGRESS NOTES
Bedside and Verbal shift change report given to Grace Leos RN (oncoming nurse) by Cole Szymanski RN (offgoing nurse).  Report included the following information SBAR, Kardex, Intake/Output and MAR.     Zone Phone:   [phone]        Significant changes during shift:  None           Patient Information     Lillie Snell  47 y.o.  4/23/2020 12:46 PM by Kin Vega MD. [facility] admitted from Home     Problem List             Patient Active Problem List     Diagnosis Date Noted    Alcohol abuse 04/24/2020    HTN (hypertension) 04/24/2020    Hypercholesteremia 04/24/2020    Ataxia 04/24/2020    Bilateral carotid artery stenosis 04/24/2020    Hx-TIA (transient ischemic attack) 04/24/2020    History of TIAs      Bilateral leg weakness      Vertigo 04/23/2020              Past Medical History:   Diagnosis Date    Alcohol abuse      Bilateral leg weakness      Developmental venous anomaly      History of TIAs              Core Measures:     CVA: Yes Yes     Activity Status:     OOB to Chair Yes  Ambulated this shift Yes   Bed Rest No     DVT prophylaxis:     DVT prophylaxis Med- Yes  DVT prophylaxis SCD or CARMITA- No      Wounds: (If Applicable)     Wounds- No     Patient Safety:     Falls Score Total Score: 3  Safety Level_______  Bed Alarm On? Yes  Sitter? No     Plan for upcoming shift: safety, neuro checks,            Discharge Plan: Yes when ready     Active Consults:  IP CONSULT TO NEUROLOGY  IP CONSULT TO NEUROSURGERY

## 2020-04-27 NOTE — PROGRESS NOTES
* No surgery found *  * No surgery found *  Bedside shift change report given to Nikki (oncoming nurse) by Chitra Mtz (offgoing nurse). Report included the following information SBAR and Kardex. Zone Phone:   [phone]      Significant changes during shift:  No significant changes during shift        Patient Information    Ed Ding  47 y.o.  4/23/2020 12:46 PM by Snehal Carrillo MD. Ed Ding was admitted from ED    Problem List    Patient Active Problem List    Diagnosis Date Noted    PFO (patent foramen ovale) 04/25/2020    Cervical spinal stenosis 04/25/2020    Alcohol abuse 04/24/2020    HTN (hypertension) 04/24/2020    Hypercholesteremia 04/24/2020    Ataxia 04/24/2020    Bilateral carotid artery stenosis 04/24/2020    Hx-TIA (transient ischemic attack) 04/24/2020    History of TIAs     Bilateral leg weakness     Vertigo 04/23/2020     Past Medical History:   Diagnosis Date    Alcohol abuse     Bilateral leg weakness     Developmental venous anomaly     History of TIAs          Core Measures:    CVA: Yes Yes  CHF:No No  PNA:No No    Activity Status:    OOB to Chair Yes  Ambulated this shift Yes   Bed Rest No        DVT prophylaxis:    DVT prophylaxis Med- Yes  DVT prophylaxis SCD or CARMITA- No     Wounds: (If Applicable)    Wounds- No    Location     Patient Safety:    Falls Score Total Score: 3  Safety Level_______  Bed Alarm On? No  Sitter?  No    Plan for upcoming shift: Safety and monitoring         Discharge Plan: Yes discharge TBD    Active Consults:  IP CONSULT TO NEUROLOGY  IP CONSULT TO NEUROSURGERY  IP CONSULT TO CARDIOLOGY

## 2020-04-27 NOTE — PROGRESS NOTES
Problem: Falls - Risk of  Goal: *Absence of Falls  Description: Document Gillian Leung Fall Risk and appropriate interventions in the flowsheet.   Outcome: Progressing Towards Goal  Note: Fall Risk Interventions:  Mobility Interventions: Bed/chair exit alarm         Medication Interventions: Bed/chair exit alarm    Elimination Interventions: Bed/chair exit alarm    History of Falls Interventions: Bed/chair exit alarm         Problem: Patient Education: Go to Patient Education Activity  Goal: Patient/Family Education  Outcome: Progressing Towards Goal

## 2020-04-27 NOTE — PROGRESS NOTES
Problem: Self Care Deficits Care Plan (Adult)  Goal: *Acute Goals and Plan of Care (Insert Text)  Description: FUNCTIONAL STATUS PRIOR TO ADMISSION: Patient was independent and active without use of DME. Pt reports being previously independent in ADLs, does not own any equipment, and has few resources. HOME SUPPORT: Patient is homeless and reports no local supports available. Pt has no equipment    Occupational Therapy Goals  Initiated 4/24/2020   1. Patient will perform standing BUE grooming with modified independence within 7 day(s). 2.  Patient will perform toilet transfers with modified independence within 7 day(s). 3.  Patient will perform all aspects of toileting with independence within 7 day(s). 4.  Patient will participate in upper extremity therapeutic exercise/activities with independence for 10 minutes within 7 day(s). 5.  Patient will utilize energy conservation techniques during functional activities with verbal cues within 7 day(s). Outcome: Progressing Towards Goal   OCCUPATIONAL THERAPY TREATMENT  Patient: Tatiana Hall (85 y.o. male)  Date: 4/27/2020  Diagnosis: Vertigo [R42]  Vertigo [R42]   Bilateral leg weakness       Precautions: Seizure  Chart, occupational therapy assessment, plan of care, and goals were reviewed. ASSESSMENT  Patient continues with skilled OT services and is progressing towards goals. Pt was educated in using RW safely during standing adls and simulated IADLs. Pt demonstrated and verbalized understanding. Pt required supervision/verbal cues and stand by assistance only for adls this date. He reported concern that earlier this date, he felt lightheaded during mobility. No complaints this afternoon. Pt reported that he'd been up in the chair for several hours this afternoon. Pt continues to progress and will likely not need OT services at discharge.        Current Level of Function Impacting Discharge (ADLs): supervision/stand by assistance    Other factors to consider for discharge: pt is homeless         PLAN :  Patient continues to benefit from skilled intervention to address the above impairments. Continue treatment per established plan of care. to address goals. Recommend with staff: continue to encourage OOB mobility with supervision    Recommend next OT session: simulated IADLs    Recommendation for discharge: (in order for the patient to meet his/her long term goals)  No skilled occupational therapy/ follow up rehabilitation needs identified at this time. This discharge recommendation:  Has not yet been discussed the attending provider and/or case management    IF patient discharges home will need the following DME: none       SUBJECTIVE:   Patient stated I got lightheaded today walking down the [place].     OBJECTIVE DATA SUMMARY:   Cognitive/Behavioral Status:  Neurologic State: Alert; Appropriate for age  Orientation Level: Appropriate for age  Cognition: Follows commands  Perception: Appears intact  Perseveration: No perseveration noted  Safety/Judgement: Awareness of environment; Fall prevention;Home safety; Insight into deficits    Functional Mobility and Transfers for ADLs:  Transfers:  Sit to Stand: supervision  Functional Transfers  Bathroom Mobility: Supervision/set up(pt using RW)  Toilet Transfer : Supervision       Balance:  Sitting: Intact  Standing: Impaired  Standing - Static: Constant support;Good  Standing - Dynamic : Constant support;Good;Fair(no LOB-using RW--educated in safe technique)    ADL Intervention:       Grooming  Grooming Assistance: Stand-by assistance  Position Performed: Standing  Washing Hands: Stand-by assistance  Adaptive Equipment: (RW, educated on safe technique for standing adls. )                   Lower Body Dressing Assistance  Socks:  Independent  Leg Crossed Method Used: Yes  Position Performed: Seated in chair    Toileting  Clothing Management: Stand-by assistance  Cues: Verbal cues provided(for safe technique)  Adaptive Equipment: Grab bars    Cognitive Retraining  Safety/Judgement: Awareness of environment; Fall prevention;Home safety; Insight into deficits    Therapeutic Exercises:   Encouraged OOB activities and seated exercises. Pain:  No pain    Pt reports that he has numbness and tingling in B hands/UEs, states that his left hand (dominant) is weaker than his right. Provided yellow and blue resistive sponges and educated in using to increase  strength. Activity Tolerance:   Good--no complaints   Please refer to the flowsheet for vital signs taken during this treatment. After treatment patient left in no apparent distress:   Sitting in chair, Call bell within reach, Bed / chair alarm activated, and nursing notified     COMMUNICATION/COLLABORATION:   The patients plan of care was discussed with: Registered nurse.      SUSHIL Mendez/L  Time Calculation: 28 mins

## 2020-04-27 NOTE — PROGRESS NOTES
Hospitalist Progress Note    NAME: Raisa Ledezma   :  1970   MRN:  [id number]     Interim Hospital Summary: 47 y.o. male whom presented on 2020 with      Assessment / Plan:    CVA  PFO  Ataxia or gait difficulty  -MRI brain:  IMPRESSION:   Focal restricted diffusion and T2 hyperintensity in the splenium of the corpus callosum. Mild generalized volume loss.  -Echo EF 55-60%, positive bubble study demonstrating right to left atrial shunt / PFO  -LE duplex no DVT  -Follow up with Dr Yuri Guerrero for non urgent closure of possible PFO  -needs outpatient or home PT   -ASA    Cervical myelopathy  -MRI C spine  1. C4-C5 severe central spinal canal stenosis, cord compression, and cord  myelomalacia more likely than cord contusion. 2. C6-C7 moderate central spinal canal stenosis. 3. Degenerative disease is superimposed on a congenitally slender cervical  spinal canal. Stenoses are detailed above.  -needs to follow up with NSGY for elective cervical decompression in 3 months    ETOH abuse  -continue thiamine and folate  -he says that he used to drink heavier    HLD  -continue lipitor      18.5 - 24.9 Normal weight / Body mass index is 23.71 kg/m². Code status: Full  Prophylaxis: Hep SQ  Recommended Disposition:  PT, OT, RN       Subjective:     Chief Complaint / Reason for Physician Visit  Follow up of CVA, cervical myelopathy, ETOH, HTN  Chart reviewed in detail. Discussed with RN events overnight. Felt dizzy and unsteady today with PT    Review of Systems:  Symptom Y/N Comments  Symptom Y/N Comments   Fever/Chills    Chest Pain     Poor Appetite    Edema     Cough    Abdominal Pain     Sputum    Joint Pain     SOB/DIAZ    Pruritis/Rash     Nausea/vomit    Tolerating PT/OT     Diarrhea    Tolerating Diet     Constipation    Other       Could NOT obtain due to:      PO intake: No data found.   Objective:     VITALS:   Last 24hrs VS reviewed since prior progress note. Most recent are:  Patient Vitals for the past 24 hrs:   Temp Pulse Resp BP SpO2   04/27/20 1116 98 °F (36.7 °C) 60 16 116/78 98 %   04/27/20 0849 98.1 °F (36.7 °C) 60 16 141/84 98 %   04/27/20 0626 98.1 °F (36.7 °C) (!) 53 20 (!) 139/92 97 %   04/26/20 2253 98.2 °F (36.8 °C) (!) 59 18 123/84    04/26/20 1500 98 °F (36.7 °C) 67 16 148/76      No intake or output data in the 24 hours ending 04/27/20 1132     PHYSICAL EXAM:  General: WD, WN. Alert, cooperative, no acute distress    EENT:  EOMI. Anicteric sclerae. MMM  Resp:  CTA bilaterally, no wheezing or rales. No accessory muscle use  CV:  Regular  rhythm,  No edema  GI:  Soft, Non distended, Non tender.  +Bowel sounds  Neurologic:  Alert and oriented X 3, normal speech, weak LE but non focal.  Feels unsteady  Psych:   Good insight. Not anxious nor agitated  Skin:  No rashes. No jaundice    Reviewed most current lab test results and cultures  YES  Reviewed most current radiology test results   YES  Review and summation of old records today    NO  Reviewed patient's current orders and MAR    YES  PMH/SH reviewed - no change compared to H&P  ________________________________________________________________________  Care Plan discussed with:    Comments   Patient x    Family      RN x    Care Manager     Consultant                        Multidiciplinary team rounds were held today with , nursing, pharmacist and clinical coordinator. Patient's plan of care was discussed; medications were reviewed and discharge planning was addressed.      ________________________________________________________________________  Total NON critical care TIME:   25   Minutes    Total CRITICAL CARE TIME Spent:   Minutes non procedure based      Comments   >50% of visit spent in counseling and coordination of care x     This includes time during multidisciplinary rounds if indicated above   ________________________________________________________________________  Olivier Mckinney Dejon Mondragon MD     Procedures: see electronic medical records for all procedures/Xrays and details which were not copied into this note but were reviewed prior to creation of Plan. LABS:  I reviewed today's most current labs and imaging studies. Pertinent labs include:  No results for input(s): WBC, HGB, HCT, PLT, HGBEXT, HCTEXT, PLTEXT in the last 72 hours. No results for input(s): NA, K, CL, CO2, GLU, BUN, CREA, CA, MG, PHOS, ALB, TBIL, TBILI, SGOT, ALT, INR, INREXT in the last 72 hours.

## 2020-04-27 NOTE — PROGRESS NOTES
Agree with Dr. Anthony Yip. Hold off on cervical decompression if possible for at least 3 months.   Recommend follow up in my office in 3-4 months as outpt

## 2020-04-28 ENCOUNTER — APPOINTMENT (OUTPATIENT)
Dept: GENERAL RADIOLOGY | Age: 50
DRG: 045 | End: 2020-04-28
Attending: INTERNAL MEDICINE
Payer: MEDICAID

## 2020-04-28 PROCEDURE — 97530 THERAPEUTIC ACTIVITIES: CPT | Performed by: OCCUPATIONAL THERAPIST

## 2020-04-28 PROCEDURE — 74011250636 HC RX REV CODE- 250/636: Performed by: INTERNAL MEDICINE

## 2020-04-28 PROCEDURE — 97116 GAIT TRAINING THERAPY: CPT

## 2020-04-28 PROCEDURE — 94760 N-INVAS EAR/PLS OXIMETRY 1: CPT

## 2020-04-28 PROCEDURE — 71045 X-RAY EXAM CHEST 1 VIEW: CPT

## 2020-04-28 PROCEDURE — 74011250637 HC RX REV CODE- 250/637: Performed by: INTERNAL MEDICINE

## 2020-04-28 PROCEDURE — 65270000029 HC RM PRIVATE

## 2020-04-28 RX ORDER — ASPIRIN 325 MG/1
100 TABLET, FILM COATED ORAL DAILY
Qty: 30 TAB | Refills: 1 | Status: SHIPPED | OUTPATIENT
Start: 2020-04-29 | End: 2020-05-04 | Stop reason: SDUPTHER

## 2020-04-28 RX ORDER — ONDANSETRON 4 MG/1
4 TABLET, ORALLY DISINTEGRATING ORAL
Status: DISCONTINUED | OUTPATIENT
Start: 2020-04-28 | End: 2020-05-04 | Stop reason: HOSPADM

## 2020-04-28 RX ORDER — ATORVASTATIN CALCIUM 40 MG/1
40 TABLET, FILM COATED ORAL
Qty: 30 TAB | Refills: 1 | Status: SHIPPED | OUTPATIENT
Start: 2020-04-28 | End: 2020-04-30 | Stop reason: SDUPTHER

## 2020-04-28 RX ORDER — GUAIFENESIN 100 MG/5ML
81 LIQUID (ML) ORAL DAILY
Qty: 30 TAB | Refills: 2 | Status: SHIPPED | OUTPATIENT
Start: 2020-04-29 | End: 2020-05-04 | Stop reason: SDUPTHER

## 2020-04-28 RX ORDER — FOLIC ACID 1 MG/1
1 TABLET ORAL DAILY
Qty: 30 TAB | Refills: 1 | Status: SHIPPED | OUTPATIENT
Start: 2020-04-29 | End: 2020-05-04 | Stop reason: SDUPTHER

## 2020-04-28 RX ORDER — METOPROLOL TARTRATE 25 MG/1
12.5 TABLET, FILM COATED ORAL EVERY 12 HOURS
Qty: 30 TAB | Refills: 1 | Status: SHIPPED | OUTPATIENT
Start: 2020-04-28 | End: 2020-05-04

## 2020-04-28 RX ADMIN — METOPROLOL TARTRATE 12.5 MG: 25 TABLET, FILM COATED ORAL at 21:46

## 2020-04-28 RX ADMIN — THIAMINE HCL TAB 100 MG 100 MG: 100 TAB at 09:30

## 2020-04-28 RX ADMIN — FOLIC ACID 1 MG: 1 TABLET ORAL at 09:30

## 2020-04-28 RX ADMIN — HEPARIN SODIUM 5000 UNITS: 5000 INJECTION INTRAVENOUS; SUBCUTANEOUS at 10:22

## 2020-04-28 RX ADMIN — ATORVASTATIN CALCIUM 40 MG: 40 TABLET, FILM COATED ORAL at 21:46

## 2020-04-28 RX ADMIN — ASPIRIN 81 MG 81 MG: 81 TABLET ORAL at 09:30

## 2020-04-28 RX ADMIN — HEPARIN SODIUM 5000 UNITS: 5000 INJECTION INTRAVENOUS; SUBCUTANEOUS at 05:04

## 2020-04-28 RX ADMIN — HEPARIN SODIUM 5000 UNITS: 5000 INJECTION INTRAVENOUS; SUBCUTANEOUS at 18:22

## 2020-04-28 RX ADMIN — THERA TABS 1 TABLET: TAB at 09:30

## 2020-04-28 RX ADMIN — METOPROLOL TARTRATE 12.5 MG: 25 TABLET, FILM COATED ORAL at 09:29

## 2020-04-28 NOTE — PROGRESS NOTES
* No surgery found *  * No surgery found *  Bedside shift change report given to [facility] (oncoming nurse) by Kandi Cid (offgoing nurse). Report included the following information SBAR and Kardex. Zone Phone:   [phone]      Significant changes during shift:  No significant changes         Patient Information    Gallo Cortez  47 y.o.  4/23/2020 12:46 PM by Mesha Lantigua MD. Gallo Cortez was admitted from Home    Problem List    Patient Active Problem List    Diagnosis Date Noted    PFO (patent foramen ovale) 04/25/2020    Cervical spinal stenosis 04/25/2020    Alcohol abuse 04/24/2020    HTN (hypertension) 04/24/2020    Hypercholesteremia 04/24/2020    Ataxia 04/24/2020    Bilateral carotid artery stenosis 04/24/2020    Hx-TIA (transient ischemic attack) 04/24/2020    History of TIAs     Bilateral leg weakness     Vertigo 04/23/2020     Past Medical History:   Diagnosis Date    Alcohol abuse     Bilateral leg weakness     Developmental venous anomaly     History of TIAs          Core Measures:    CVA: Yes Yes    Activity Status:    OOB to Chair Yes  Ambulated this shift Yes   Bed Rest No    Supplemental O2: (If Applicable)    NC No  NRB No  Venti-mask No      DVT prophylaxis:    DVT prophylaxis Med- Yes  DVT prophylaxis SCD or ACRMITA- No       Patient Safety:    Falls Score Total Score: 3  Safety Level_______  Bed Alarm On? No  Sitter?  No    Plan for upcoming shift: Safety and monitoring         Discharge Plan: Yes going [facility] when ready     Active Consults:  IP CONSULT TO NEUROLOGY  IP CONSULT TO NEUROSURGERY  IP CONSULT TO CARDIOLOGY

## 2020-04-28 NOTE — PROGRESS NOTES
Hospitalist Progress Note    NAME: Sosa Garcia   :  1970   MRN:  [id number]     Interim Hospital Summary: 47 y.o. male whom presented on 2020 with      Assessment / Plan:    CVA  PFO  Ataxia or gait difficulty  -MRI brain:  IMPRESSION:   Focal restricted diffusion and T2 hyperintensity in the splenium of the corpus callosum. Mild generalized volume loss.  -Echo EF 55-60%, positive bubble study demonstrating right to left atrial shunt / PFO  -LE duplex no DVT  -Follow up with Dr Kaylyn Harmon for non urgent closure of possible PFO  -needs outpatient or home PT   -continue ASA  -DC planning. Discussed with CM, patient is homeless and was stating in a hotel prior to this admission. 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 He used to stay at a shelter in [facility]. [facility] the [facility]     Cervical myelopathy  -MRI C spine  1. C4-C5 severe central spinal canal stenosis, cord compression, and cord  myelomalacia more likely than cord contusion. 2. C6-C7 moderate central spinal canal stenosis. 3. Degenerative disease is superimposed on a congenitally slender cervical  spinal canal. Stenoses are detailed above.  -needs to follow up with NSGY for elective cervical decompression in 3 months    ETOH abuse  -continue thiamine and folate. No issues with withdrawal or DT  -he says that he used to drink heavier    HLD  -continue lipitor      18.5 - 24.9 Normal weight / Body mass index is 23.71 kg/m². Code status: Full  Prophylaxis: Hep SQ  Recommended Disposition:  PT, OT, RN       Subjective:     Chief Complaint / Reason for Physician Visit  Follow up of CVA, cervical myelopathy, ETOH, HTN  Chart reviewed in detail. Discussed with RN events overnight. Less dizzy today.   Ambulating with walker    Review of Systems:  Symptom Y/N Comments  Symptom Y/N Comments Fever/Chills    Chest Pain     Poor Appetite    Edema     Cough    Abdominal Pain     Sputum    Joint Pain     SOB/DIAZ    Pruritis/Rash     Nausea/vomit    Tolerating PT/OT     Diarrhea    Tolerating Diet     Constipation    Other       Could NOT obtain due to:      PO intake: No data found. Objective:     VITALS:   Last 24hrs VS reviewed since prior progress note. Most recent are:  Patient Vitals for the past 24 hrs:   Temp Pulse Resp BP SpO2   04/28/20 0727 98.1 °F (36.7 °C) (!) 56 18 142/86 100 %   04/28/20 0442 97.9 °F (36.6 °C) 64 18 131/78 98 %   04/27/20 2306 98 °F (36.7 °C) 86 16 121/71 98 %   04/27/20 2055 97.9 °F (36.6 °C) 63 18 (!) 129/91 98 %   04/27/20 1515 98.4 °F (36.9 °C) 84 16 112/73 99 %   04/27/20 1116 98 °F (36.7 °C) 60 16 116/78 98 %       Intake/Output Summary (Last 24 hours) at 4/28/2020 0943  Last data filed at 4/28/2020 0443  Gross per 24 hour   Intake    Output 1625 ml   Net -1625 ml        PHYSICAL EXAM:  General: WD, WN. Alert, cooperative, no acute distress    EENT:  EOMI. Anicteric sclerae. MMM  Resp:  CTA bilaterally, no wheezing or rales. No accessory muscle use  CV:  Regular  rhythm,  No edema  GI:  Soft, Non distended, Non tender.  +Bowel sounds  Neurologic:  Alert and oriented X 3, normal speech, weak LE but non focal.  Feels unsteady  Psych:   Good insight. Not anxious nor agitated  Skin:  No rashes. No jaundice    Reviewed most current lab test results and cultures  YES  Reviewed most current radiology test results   YES  Review and summation of old records today    NO  Reviewed patient's current orders and MAR    YES  PMH/SH reviewed - no change compared to H&P  ________________________________________________________________________  Care Plan discussed with:    Comments   Patient x    Family      RN x    Care Manager     Consultant                        Multidiciplinary team rounds were held today with , nursing, pharmacist and clinical coordinator. Patient's plan of care was discussed; medications were reviewed and discharge planning was addressed. ________________________________________________________________________  Total NON critical care TIME:   25   Minutes    Total CRITICAL CARE TIME Spent:   Minutes non procedure based      Comments   >50% of visit spent in counseling and coordination of care x     This includes time during multidisciplinary rounds if indicated above   ________________________________________________________________________  Pascual Headings, MD     Procedures: see electronic medical records for all procedures/Xrays and details which were not copied into this note but were reviewed prior to creation of Plan. LABS:  I reviewed today's most current labs and imaging studies. Pertinent labs include:  No results for input(s): WBC, HGB, HCT, PLT, HGBEXT, HCTEXT, PLTEXT, HGBEXT, HCTEXT, PLTEXT in the last 72 hours. No results for input(s): NA, K, CL, CO2, GLU, BUN, CREA, CA, MG, PHOS, ALB, TBIL, TBILI, SGOT, ALT, INR, INREXT, INREXT in the last 72 hours.

## 2020-04-28 NOTE — PROGRESS NOTES
Problem: Self Care Deficits Care Plan (Adult)  Goal: *Acute Goals and Plan of Care (Insert Text)  Description: FUNCTIONAL STATUS PRIOR TO ADMISSION: Patient was independent and active without use of DME. Pt reports being previously independent in ADLs, does not own any equipment, and has few resources. HOME SUPPORT: Patient is homeless and reports no local supports available. Pt has no equipment    Occupational Therapy Goals  Initiated 4/24/2020   1. Patient will perform standing BUE grooming with modified independence within 7 day(s). 2.  Patient will perform toilet transfers with modified independence within 7 day(s). 3.  Patient will perform all aspects of toileting with independence within 7 day(s). 4.  Patient will participate in upper extremity therapeutic exercise/activities with independence for 10 minutes within 7 day(s). 5.  Patient will utilize energy conservation techniques during functional activities with verbal cues within 7 day(s). Outcome: Progressing Towards Goal   OCCUPATIONAL THERAPY TREATMENT  Patient: Chong Jolley (25 y.o. male)  Date: 4/28/2020  Diagnosis: Vertigo [R42]  Vertigo [R42]   Bilateral leg weakness       Precautions: Seizure  Chart, occupational therapy assessment, plan of care, and goals were reviewed. ASSESSMENT  Patient continues with skilled OT services and is progressing towards goals. Pt participated in adl mobility with supervision using the RW. Pt tolerated tx session well and performed the exercises for strengthening with minimal cues. Pt is planning discharge tomorrow. Current Level of Function Impacting Discharge (ADLs): generally supervision    Other factors to consider for discharge: pt with limited social support         PLAN :  Patient continues to benefit from skilled intervention to address the above impairments. Continue treatment per established plan of care. to address goals.     Recommend with staff: ambulate to [facility] for all toileting needs    Recommend next OT session: dressing continue balance challenges    Recommendation for discharge: (in order for the patient to meet his/her long term goals)  Occupational therapy at least 2 days/week  AND ensure assist and/or supervision for safety with adls/mobility     This discharge recommendation:  Has been made in collaboration with the attending provider and/or case management    IF patient discharges home will need the following DME: likely a RW       SUBJECTIVE:   Patient stated oh no, I'm right handed.   (correction from 4/27 tx session)    OBJECTIVE DATA SUMMARY:   Cognitive/Behavioral Status:  Neurologic State: Alert  Orientation Level: Appropriate for age;Oriented X4                Functional Mobility and Transfers for ADLs:         Transfers:  Sit to Stand: Supervision       Using RW  Encouraged pt to stop using the urinal and call for nursing's assistance to ambulate to the bathroom Using RW--educated verbally on technique and pt verbalized understanding. Balance:  Sitting: Intact  Standing: Impaired  Standing - Static: Constant support;Good  Standing - Dynamic : Constant support; Fair  Using RW for support at this time  ADL Intervention:   Pt performed adl mobility. He is able to bend forward from sitting position to his LEs without LOB nor complaint of lightheadedness. Pt educated in safe technique using the RW for toileting and verbalized understanding. Therapeutic Exercises:   Seated exercises for LUE strengthening and to increase sensorimotor input. Used blue sponge and yellow resistive sponge to perform hand exercises--pt with good recall of the exercises and added more for increasing fine motor control. (Pinch and finger adduction)    Pain:  No complaints    Activity Tolerance:   Good  Please refer to the flowsheet for vital signs taken during this treatment.     After treatment patient left in no apparent distress:   Sitting in chair, Call bell within reach, Bed / chair alarm activated, and nursing informed     COMMUNICATION/COLLABORATION:   The patients plan of care was discussed with: Physical therapist, Registered nurse, Physician, and Case management.      Zeinab Byrd, OTR/L  Time Calculation: 26 mins

## 2020-04-28 NOTE — DISCHARGE INSTRUCTIONS
HOSPITALIST DISCHARGE INSTRUCTIONS    NAME: Rupinder Marquez   :  1970   MRN:  [id number]     Date/Time:  2020 10:59 AM    ADMIT DATE: 2020   DISCHARGE DATE: 2020         · It is important that you take the medication exactly as they are prescribed. · Keep your medication in the bottles provided by the pharmacist and keep a list of the medication names, dosages, and times to be taken in your wallet. · Do not take other medications without consulting your doctor. What to do at Home    Recommended diet:  Regular Diet    Recommended activity: PT/OT per Home Health      If you have questions regarding the hospital related prescriptions or hospital related issues please call [facility] at [phone]. You can always direct your questions to your primary care doctor if you are unable to reach your hospital physician; your PCP works as an extension of your hospital doctor just like your hospital doctor is an extension of your PCP for your time at the hospital [facility])    If you experience any of the following symptoms then please call your primary care physician or return to the emergency room if you cannot get hold of your doctor:    Fever, chills, nausea, vomiting, or persistent diarrhea  Worsening weakness or new problems with your speech or balance  Dark stools or visible blood in your stools  New Leg swelling or shortness of breath as these could be signs of a clot    Additional Instructions:                Information obtained by :  I understand that if any problems occur once I am at home I am to contact my physician. I understand and acknowledge receipt of the instructions indicated above.                                                                                                                                            Physician's or R.N.'s Signature                                                                  Date/Time Patient or Representative Signature

## 2020-04-28 NOTE — PROGRESS NOTES
Problem: Falls - Risk of  Goal: *Absence of Falls  Description: Document Kashif Wagner Fall Risk and appropriate interventions in the flowsheet.   Outcome: Progressing Towards Goal  Note: Fall Risk Interventions:  Mobility Interventions: Bed/chair exit alarm         Medication Interventions: Bed/chair exit alarm    Elimination Interventions: Bed/chair exit alarm    History of Falls Interventions: Bed/chair exit alarm         Problem: Patient Education: Go to Patient Education Activity  Goal: Patient/Family Education  Outcome: Progressing Towards Goal     Problem: Patient Education: Go to Patient Education Activity  Goal: Patient/Family Education  Outcome: Progressing Towards Goal     Problem: Patient Education: Go to Patient Education Activity  Goal: Patient/Family Education  Outcome: Progressing Towards Goal

## 2020-04-28 NOTE — PROGRESS NOTES
YVONNE:  1. [facility]  2. Home health PT/OT  3. Medicaid transport at d/c    Referral and clinical documentation sent to Reyes Ma at [address] [place] [address] for review. Pt signed all required signature pages for referral. CM will continue to follow. Lena Senior MSEMIL  Care Manager  [phone] 12:31 PM  Referral received - will take at least 24 hrs for review, per [facility] admissions. Additionally, pt will need a chest XR done per requirements of [facility].  CM requested order from MD.

## 2020-04-28 NOTE — DISCHARGE SUMMARY
Hospitalist Discharge Summary     Patient ID:  Tali Ron  [id number]  47 y.o.  1970    PCP on record: None    Admit date: 4/23/2020  Discharge date and time: 4/28/2020      DISCHARGE DIAGNOSIS:    CVA with ataxia  PFO  Cervical myelopathy due to severe spinal stenosis  ETOH abuse  HLD    CONSULTATIONS:  IP CONSULT TO NEUROLOGY  IP CONSULT TO NEUROSURGERY  IP CONSULT TO CARDIOLOGY    Excerpted HPI from H&P of Tevin Carrasco MD:  CHIEF COMPLAINT: I felt like I was staggering today when I walked     HISTORY OF PRESENT ILLNESS:     58-year-old -American male     Originally from [place], currently is homeless     Says he had a stroke in [facility] in January 2020  Presented with similar symptoms to today  Says he is not currently taking an aspirin or any prescription medications     Awoke this morning suddenly felt unsteady when he walked \"like I was staggering\"  Initially denied drinking but had an alcohol level of 208 in the emergency room  No headache  No speech changes or visual changes  No focal weakness but did admit to bilateral leg weakness  No sensory changes  No neck or back pain  No fevers chills, cough, shortness of breath, chest pain, nausea vomiting, diarrhea, abdominal pain     Called EMS because of the unsteady gait     Emergency room neuro exam nonfocal  Hemodynamically stable  Head CT scan negative  CTA head and neck with no large vessel occlusion and no significant vascular disease              Right frontal developmental venous anomaly  Tele neurology consulted, recommended admission for MRI and further evaluation    ______________________________________________________________________  DISCHARGE SUMMARY/HOSPITAL COURSE:  for full details see H&P, daily progress notes, labs, consult notes. CVA with ataxia  PFO  -MRI brain:  IMPRESSION:   Focal restricted diffusion and T2 hyperintensity in the splenium of the corpus callosum.  Mild generalized volume loss.  -Echo EF 55-60%, positive bubble study demonstrating right to left atrial shunt / PFO  -LE duplex no DVT  -Follow up with Dr Edyta Braxton for non urgent closure of possible PFO  -needs outpatient or home PT   -continue ASA  -DC planning. Discussed with CM, patient is homeless and was stating in a hotel prior to this admission. 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 He used to stay at a shelter in [facility]. [facility]      Cervical myelopathy due to severe spinal stenosis  -MRI C spine  1. C4-C5 severe central spinal canal stenosis, cord compression, and cord  myelomalacia more likely than cord contusion. 2. C6-C7 moderate central spinal canal stenosis. 3. Degenerative disease is superimposed on a congenitally slender cervical  spinal canal. Stenoses are detailed above.  -needs to follow up with NSGY for elective cervical decompression in 3 months     ETOH abuse  -continue thiamine and folate  -he says that he used to drink heavier     HLD  -continue lipitor      _______________________________________________________________________  Patient seen and examined by me on discharge day. Pertinent Findings:  Gen:    Not in distress  Chest: Clear lungs  CVS:   Regular rhythm. No edema  Abd:  Soft, not distended, not tender  Neuro:  Alert, Oriented x 4, grossly non focal exam  _______________________________________________________________________  DISCHARGE MEDICATIONS:   Current Discharge Medication List      START taking these medications    Details   aspirin 81 mg chewable tablet Take 1 Tab by mouth daily for 90 days. Qty: 30 Tab, Refills: 2      atorvastatin (LIPITOR) 40 mg tablet Take 1 Tab by mouth nightly for 60 days. Qty: 30 Tab, Refills: 1      folic acid (FOLVITE) 1 mg tablet Take 1 Tab by mouth daily for 60 days.   Qty: 30 Tab, Refills: 1      metoprolol tartrate (LOPRESSOR) 25 mg tablet Take 0.5 Tabs by mouth every twelve (12) hours for 60 days. Qty: 30 Tab, Refills: 1      thiamine mononitrate (B-1) 100 mg tablet Take 1 Tab by mouth daily for 60 days. Qty: 30 Tab, Refills: 1             My Recommended Diet, Activity, Wound Care, and follow-up labs are listed in the patient's Discharge Insturctions which I have personally completed and reviewed.   Risk of deterioration: Low    Condition at Discharge:  Stable  _____________________________________________________________________    Disposition  Home with family and home health services  ____________________________________________________________________    Care Plan discussed with:   Patient, Family, RN, Care Manager, Consultant    ____________________________________________________________________    Code Status: Full Code  ____________________________________________________________________      Condition at Discharge:  Stable  _____________________________________________________________________  Follow up with:   PCP : None  Follow-up Information     Follow up With Specialties Details Why Contact Info    Edward Boston MD Cardiology, Vascular Surgery, General and Vascular Surgery Schedule an appointment as soon as possible for a visit in 2 weeks for virtual visit [facility]  [phone]      Janene Myers MD Neurosurgery In 3 months to discuss possible neck surgery  [facility]  [phone]                Total time in minutes spent coordinating this discharge (includes going over instructions, follow-up, prescriptions, and preparing report for sign off to her PCP) :  35 minutes    Signed:  Velma Gowers, MD

## 2020-04-28 NOTE — PROGRESS NOTES
Problem: Mobility Impaired (Adult and Pediatric)  Goal: *Acute Goals and Plan of Care (Insert Text)  Description:   FUNCTIONAL STATUS PRIOR TO ADMISSION: Patient is homeless, unclear where he had been sleeping and does not have a car. Patient reports he ambulated without AD up until day of hospitalization. HOME SUPPORT PRIOR TO ADMISSION: Patient report no friends or family to provide assistance. Physical Therapy Goals  Initiated 4/24/2020    1. Patient will transfer from bed to chair and chair to bed with supervision/set-up using the least restrictive device within 7 day(s). 2.  Patient will perform sit to stand with supervision/set-up within 7 day(s). 3.  Patient will ambulate with supervision/set-up for 200 feet with the least restrictive device within 7 day(s). 4.  Patient will improve Monson Balance score by 7 points within 7 days. Outcome: Progressing Towards Goal   PHYSICAL THERAPY TREATMENT  Patient: Mercedez Talavera (62 y.o. male)  Date: 4/28/2020  Diagnosis: Vertigo [R42]  Vertigo [R42]   Bilateral leg weakness       Precautions: Seizure  Chart, physical therapy assessment, plan of care and goals were reviewed. ASSESSMENT  Patient continues with skilled PT services and is progressing towards goals. Patient received up in chair and agreeable to participate, is hoping to discharge tomorrow to [facility] for medical respite. Patient is transferring at supervision level and was able to go up and down 12 steps with rail and instruction for safety and sequencing. Patient ambulated in the hallway x aprox 120 feet with RW and mild ataxia, stability improves with verbal cuing and activation of core. Patient educated on falls prevention and safety and left sitting up in chair with call bell in reach.       Current Level of Function Impacting Discharge (mobility/balance): supervision/CGA    Other factors to consider for discharge: homeless         PLAN :  Patient continues to benefit from skilled intervention to address the above impairments. Continue treatment per established plan of care. to address goals. Recommendation for discharge: (in order for the patient to meet his/her long term goals)  Physical therapy at least 2 days/week in the home     This discharge recommendation:  Has been made in collaboration with the attending provider and/or case management    IF patient discharges home will need the following DME: rolling walker       SUBJECTIVE:   Patient stated I still feel a little light headed at times.     OBJECTIVE DATA SUMMARY:   Critical Behavior:  Neurologic State: Alert  Orientation Level: Appropriate for age, Oriented X4  Cognition: Follows commands  Safety/Judgement: Awareness of environment, Fall prevention, Home safety, Insight into deficits  Functional Mobility Training:  Bed Mobility:                    Transfers:  Sit to Stand: Supervision  Stand to Sit: Supervision        Bed to Chair: Supervision                    Balance:  Sitting: Intact  Standing: Impaired  Standing - Static: Constant support;Good  Standing - Dynamic : Constant support; Fair  Ambulation/Gait Training:  Distance (ft): 120 Feet (ft)  Assistive Device: Gait belt;Walker, rolling  Ambulation - Level of Assistance: Contact guard assistance        Gait Abnormalities: Ataxic;Decreased step clearance        Base of Support: Widened     Speed/Vandana: Pace decreased (<100 feet/min)  Step Length: Left shortened;Right shortened                    Stairs:  Number of Stairs Trained: 12  Stairs - Level of Assistance: Contact guard assistance   Rail Use: Right       Activity Tolerance:   Good  Please refer to the flowsheet for vital signs taken during this treatment. After treatment patient left in no apparent distress:   Sitting in chair, Call bell within reach, and Bed / chair alarm activated    COMMUNICATION/COLLABORATION:   The patients plan of care was discussed with: Occupational therapist and Registered nurse. Trupti Flores, PT   Time Calculation: 29 mins

## 2020-04-28 NOTE — PROGRESS NOTES
Bedside shift change report given to [address] (oncoming nurse) by Vicente Washington RN (offgoing nurse). Report included the following information SBAR and Kardex.     Zone Phone:   [phone]        Significant changes during shift:  No significant changes during shift           Patient Information     Gallo Cortez  47 y.o.  4/23/2020 12:46 PM by Mesha Lantigua MD. Gallo Cortez was admitted from ED     Problem List          Patient Active Problem List     Diagnosis Date Noted    PFO (patent foramen ovale) 04/25/2020    Cervical spinal stenosis 04/25/2020    Alcohol abuse 04/24/2020    HTN (hypertension) 04/24/2020    Hypercholesteremia 04/24/2020    Ataxia 04/24/2020    Bilateral carotid artery stenosis 04/24/2020    Hx-TIA (transient ischemic attack) 04/24/2020    History of TIAs      Bilateral leg weakness      Vertigo 04/23/2020           Past Medical History:   Diagnosis Date    Alcohol abuse      Bilateral leg weakness      Developmental venous anomaly      History of TIAs              Core Measures:     CVA: Yes Yes  CHF:No No  PNA:No No     Activity Status:     OOB to Chair Yes  Ambulated this shift Yes   Bed Rest No           DVT prophylaxis:     DVT prophylaxis Med- Yes  DVT prophylaxis SCD or CARMITA- No      Wounds: (If Applicable)     Wounds- No     Location      Patient Safety:     Falls Score Total Score: 3  Safety Level_______  Bed Alarm On? No  Sitter?  No     Plan for upcoming shift: Safety and monitoring            Discharge Plan: Yes discharge TBD     Active Consults:  IP CONSULT TO NEUROLOGY  IP CONSULT TO NEUROSURGERY  IP CONSULT TO CARDIOLOGY

## 2020-04-29 PROCEDURE — 65270000029 HC RM PRIVATE

## 2020-04-29 PROCEDURE — 74011250636 HC RX REV CODE- 250/636: Performed by: INTERNAL MEDICINE

## 2020-04-29 PROCEDURE — 74011250637 HC RX REV CODE- 250/637: Performed by: HOSPITALIST

## 2020-04-29 PROCEDURE — 74011250637 HC RX REV CODE- 250/637: Performed by: INTERNAL MEDICINE

## 2020-04-29 RX ORDER — BISACODYL 5 MG
10 TABLET, DELAYED RELEASE (ENTERIC COATED) ORAL
Status: COMPLETED | OUTPATIENT
Start: 2020-04-29 | End: 2020-04-29

## 2020-04-29 RX ORDER — FACIAL-BODY WIPES
10 EACH TOPICAL DAILY PRN
Status: DISCONTINUED | OUTPATIENT
Start: 2020-04-29 | End: 2020-05-04 | Stop reason: HOSPADM

## 2020-04-29 RX ORDER — DEXTROMETHORPHAN POLISTIREX 30 MG/5 ML
SUSPENSION, EXTENDED RELEASE 12 HR ORAL AS NEEDED
Status: DISCONTINUED | OUTPATIENT
Start: 2020-04-29 | End: 2020-05-04 | Stop reason: HOSPADM

## 2020-04-29 RX ORDER — DOCUSATE SODIUM 100 MG/1
100 CAPSULE, LIQUID FILLED ORAL 2 TIMES DAILY
Status: DISCONTINUED | OUTPATIENT
Start: 2020-04-29 | End: 2020-05-04 | Stop reason: HOSPADM

## 2020-04-29 RX ADMIN — HEPARIN SODIUM 5000 UNITS: 5000 INJECTION INTRAVENOUS; SUBCUTANEOUS at 10:13

## 2020-04-29 RX ADMIN — DOCUSATE SODIUM 100 MG: 100 CAPSULE, LIQUID FILLED ORAL at 22:51

## 2020-04-29 RX ADMIN — THIAMINE HCL TAB 100 MG 100 MG: 100 TAB at 10:11

## 2020-04-29 RX ADMIN — BISACODYL 10 MG: 5 TABLET, COATED ORAL at 22:51

## 2020-04-29 RX ADMIN — ATORVASTATIN CALCIUM 40 MG: 40 TABLET, FILM COATED ORAL at 21:27

## 2020-04-29 RX ADMIN — HEPARIN SODIUM 5000 UNITS: 5000 INJECTION INTRAVENOUS; SUBCUTANEOUS at 18:00

## 2020-04-29 RX ADMIN — METOPROLOL TARTRATE 12.5 MG: 25 TABLET, FILM COATED ORAL at 21:27

## 2020-04-29 RX ADMIN — ASPIRIN 81 MG 81 MG: 81 TABLET ORAL at 10:11

## 2020-04-29 RX ADMIN — HEPARIN SODIUM 5000 UNITS: 5000 INJECTION INTRAVENOUS; SUBCUTANEOUS at 03:24

## 2020-04-29 RX ADMIN — THERA TABS 1 TABLET: TAB at 10:11

## 2020-04-29 RX ADMIN — FOLIC ACID 1 MG: 1 TABLET ORAL at 10:11

## 2020-04-29 RX ADMIN — METOPROLOL TARTRATE 12.5 MG: 25 TABLET, FILM COATED ORAL at 10:11

## 2020-04-29 NOTE — PROGRESS NOTES
YVONNE:    [facility]- [id number] [facility] [phone]  Medicaid Transport at d/c.      Cirilo Herbert is currently working with pt in the Neuro Unit. CM aware that pt is d/c, and informed CM that referral has not yet been accepted to the [facility]. CM informed that referral was sent to [facility] on 4/28/20, and it can take 24hrs for referral to be reviewed, per DP admission. DP has requested pt's chest xray (completed). CM contact DP, via telephone ( [phone] ext. [phone]), to speak to Artemio Hung (admin coordinator). However, attempt was unsuccessful,and CM left voicemail requesting a return call. CM will inform MD and pt's nurse of the following.     Lex Canavan, MSW, [address] [place] [address]

## 2020-04-29 NOTE — PROGRESS NOTES
Hospitalist Progress Note    NAME: Monster العراقي   :  1970   MRN:  [id number]     Interim Hospital Summary: 47 y.o. male whom presented on 2020 with      Assessment / Plan:    CVA  PFO  Ataxia or gait difficulty  -MRI brain:  IMPRESSION:   Focal restricted diffusion and T2 hyperintensity in the splenium of the corpus callosum. Mild generalized volume loss.  -Echo EF 55-60%, positive bubble study demonstrating right to left atrial shunt / PFO  -LE duplex no DVT  -Follow up with Dr Christine Hyatt for non urgent closure of possible PFO  -needs outpatient or home PT   -continue ASA  -DC planning. Discussed with CM, patient is homeless and was stating in a hotel prior to this admission. 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 He used to stay at a shelter in [facility]. [facility] into [facility] , still awaiting answer from them. If he does not get accepted then only option would be shelter, which is not preferable with his new CVA. Will wait overnight to see if gets accepted at [facility]    Cervical myelopathy  -MRI C spine  1. C4-C5 severe central spinal canal stenosis, cord compression, and cord  myelomalacia more likely than cord contusion. 2. C6-C7 moderate central spinal canal stenosis. 3. Degenerative disease is superimposed on a congenitally slender cervical  spinal canal. Stenoses are detailed above.  -needs to follow up with NSGY for elective cervical decompression in 3 months    ETOH abuse  -continue thiamine and folate. No issues with withdrawal or DT  -he says that he used to drink heavier    HLD  -continue lipitor      18.5 - 24.9 Normal weight / Body mass index is 23.71 kg/m².     Code status: Full  Prophylaxis: Hep SQ  Recommended Disposition:  PT, OT, RN       Subjective:     Chief Complaint / Reason for Physician Visit  Follow up of CVA, cervical myelopathy, ETOH, HTN  Chart reviewed in detail. Discussed with RN events overnight. No issues overnight    Review of Systems:  Symptom Y/N Comments  Symptom Y/N Comments   Fever/Chills    Chest Pain     Poor Appetite    Edema     Cough    Abdominal Pain     Sputum    Joint Pain     SOB/DIAZ    Pruritis/Rash     Nausea/vomit    Tolerating PT/OT     Diarrhea    Tolerating Diet     Constipation    Other       Could NOT obtain due to:      PO intake: No data found. Objective:     VITALS:   Last 24hrs VS reviewed since prior progress note. Most recent are:  Patient Vitals for the past 24 hrs:   Temp Pulse Resp BP SpO2   04/29/20 1119 98.4 °F (36.9 °C) 63 18 114/79 98 %   04/29/20 0726 98 °F (36.7 °C) 63 18 125/81 100 %   04/29/20 0343 97.7 °F (36.5 °C) (!) 56 18 124/77 97 %   04/28/20 2309 98 °F (36.7 °C) (!) 55 18 100/73 99 %   04/28/20 1942 98.1 °F (36.7 °C) 65 18 132/87 98 %   04/28/20 1523 98.6 °F (37 °C) 61 18 142/80 100 %       Intake/Output Summary (Last 24 hours) at 4/29/2020 1440  Last data filed at 4/28/2020 1822  Gross per 24 hour   Intake    Output 1100 ml   Net -1100 ml        PHYSICAL EXAM:  General: WD, WN. Alert, cooperative, no acute distress    EENT:  EOMI. Anicteric sclerae. MMM  Resp:  CTA bilaterally, no wheezing or rales. No accessory muscle use  CV:  Regular  rhythm,  No edema  GI:  Soft, Non distended, Non tender.  +Bowel sounds  Neurologic:  Alert and oriented X 3, normal speech,   Psych:   Good insight. Not anxious nor agitated  Skin:  No rashes.   No jaundice    Reviewed most current lab test results and cultures  YES  Reviewed most current radiology test results   YES  Review and summation of old records today    NO  Reviewed patient's current orders and MAR    YES  PMH/SH reviewed - no change compared to H&P  ________________________________________________________________________  Care Plan discussed with:    Comments   Patient x    Family      RN x    Care Manager     Consultant Multidiciplinary team rounds were held today with , nursing, pharmacist and clinical coordinator. Patient's plan of care was discussed; medications were reviewed and discharge planning was addressed. ________________________________________________________________________  Total NON critical care TIME:   25   Minutes    Total CRITICAL CARE TIME Spent:   Minutes non procedure based      Comments   >50% of visit spent in counseling and coordination of care x     This includes time during multidisciplinary rounds if indicated above   ________________________________________________________________________  Feng Fields MD     Procedures: see electronic medical records for all procedures/Xrays and details which were not copied into this note but were reviewed prior to creation of Plan. LABS:  I reviewed today's most current labs and imaging studies. Pertinent labs include:  No results for input(s): WBC, HGB, HCT, PLT, HGBEXT, HCTEXT, PLTEXT, HGBEXT, HCTEXT, PLTEXT in the last 72 hours. No results for input(s): NA, K, CL, CO2, GLU, BUN, CREA, CA, MG, PHOS, ALB, TBIL, TBILI, SGOT, ALT, INR, INREXT, INREXT in the last 72 hours.

## 2020-04-29 NOTE — PROGRESS NOTES
Bedside shift change report given to [address]) by eleuterio (offgoing nurse).  Report included the following information SBAR and Kardex.     Zone Phone:   [phone]        Significant changes during shift:  No significant changes during shift           Patient Information     Ed Ding  47 y.o.  4/23/2020 12:46 PM by Snehal Carrillo MD. [facility] admitted from ED     Problem List             Patient Active Problem List     Diagnosis Date Noted    PFO (patent foramen ovale) 04/25/2020    Cervical spinal stenosis 04/25/2020    Alcohol abuse 04/24/2020    HTN (hypertension) 04/24/2020    Hypercholesteremia 04/24/2020    Ataxia 04/24/2020    Bilateral carotid artery stenosis 04/24/2020    Hx-TIA (transient ischemic attack) 04/24/2020    History of TIAs      Bilateral leg weakness      Vertigo 04/23/2020              Past Medical History:   Diagnosis Date    Alcohol abuse      Bilateral leg weakness      Developmental venous anomaly      History of TIAs              Core Measures:     CVA: Yes Yes  CHF:No No  PNA:No No     Activity Status:     OOB to Chair Yes  Ambulated this shift Yes   Bed Rest No           DVT prophylaxis:     DVT prophylaxis Med- Yes  DVT prophylaxis SCD or CARMITA- No      Wounds: (If Applicable)     Wounds- No     Location      Patient Safety:     Falls Score Total Score: 3  Safety Level_______  Bed Alarm On? No  Sitter? No     Plan for upcoming shift: Safety and monitoring            Discharge Plan: Yes discharge [facility] to [facility]      Active Consults:  IP CONSULT TO NEUROLOGY  IP CONSULT TO NEUROSURGERY  IP CONSULT TO CARDIOLOGY

## 2020-04-29 NOTE — DISCHARGE SUMMARY
Hospitalist Discharge Summary     Patient ID:  Tali Ron  [id number]  47 y.o.  1970    PCP on record: None    Admit date: 4/23/2020  Discharge date and time: 4/29/2020      DISCHARGE DIAGNOSIS:    CVA with ataxia  PFO  Cervical myelopathy due to severe spinal stenosis  ETOH abuse  HLD    CONSULTATIONS:  IP CONSULT TO NEUROLOGY  IP CONSULT TO NEUROSURGERY  IP CONSULT TO CARDIOLOGY    Excerpted HPI from H&P of Tevin Carrasco MD:  CHIEF COMPLAINT: I felt like I was staggering today when I walked     HISTORY OF PRESENT ILLNESS:     50-year-old -American male     Originally from [place], currently is homeless     Says he had a stroke in [facility] in January 2020  Presented with similar symptoms to today  Says he is not currently taking an aspirin or any prescription medications     Awoke this morning suddenly felt unsteady when he walked \"like I was staggering\"  Initially denied drinking but had an alcohol level of 208 in the emergency room  No headache  No speech changes or visual changes  No focal weakness but did admit to bilateral leg weakness  No sensory changes  No neck or back pain  No fevers chills, cough, shortness of breath, chest pain, nausea vomiting, diarrhea, abdominal pain     Called EMS because of the unsteady gait     Emergency room neuro exam nonfocal  Hemodynamically stable  Head CT scan negative  CTA head and neck with no large vessel occlusion and no significant vascular disease              Right frontal developmental venous anomaly  Tele neurology consulted, recommended admission for MRI and further evaluation    ______________________________________________________________________  DISCHARGE SUMMARY/HOSPITAL COURSE:  for full details see H&P, daily progress notes, labs, consult notes. CVA with ataxia  PFO  -MRI brain:  IMPRESSION:   Focal restricted diffusion and T2 hyperintensity in the splenium of the corpus callosum.  Mild generalized volume loss.  -Echo EF 55-60%, positive bubble study demonstrating right to left atrial shunt / PFO  -LE duplex no DVT  -Follow up with Dr Lauri Trujillo for non urgent closure of possible PFO  -needs outpatient or home PT   -continue ASA  -DC planning. Discussed with CM, patient is homeless and was stating in a hotel prior to this admission. 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 He used to stay at a shelter in [facility]. [facility]      Cervical myelopathy due to severe spinal stenosis  -MRI C spine  1. C4-C5 severe central spinal canal stenosis, cord compression, and cord  myelomalacia more likely than cord contusion. 2. C6-C7 moderate central spinal canal stenosis. 3. Degenerative disease is superimposed on a congenitally slender cervical  spinal canal. Stenoses are detailed above.  -needs to follow up with NSGY for elective cervical decompression in 3 months     ETOH abuse  -continue thiamine and folate  -he says that he used to drink heavier     HLD  -continue lipitor      _______________________________________________________________________  Patient seen and examined by me on discharge day. Pertinent Findings:  Gen:    Not in distress  Chest: Clear lungs  CVS:   Regular rhythm. No edema  Abd:  Soft, not distended, not tender  Neuro:  Alert, Oriented x 4, grossly non focal exam  _______________________________________________________________________  DISCHARGE MEDICATIONS:   Current Discharge Medication List      START taking these medications    Details   aspirin 81 mg chewable tablet Take 1 Tab by mouth daily for 90 days. Qty: 30 Tab, Refills: 2      atorvastatin (LIPITOR) 40 mg tablet Take 1 Tab by mouth nightly for 60 days. Qty: 30 Tab, Refills: 1      folic acid (FOLVITE) 1 mg tablet Take 1 Tab by mouth daily for 60 days.   Qty: 30 Tab, Refills: 1      metoprolol tartrate (LOPRESSOR) 25 mg tablet Take 0.5 Tabs by mouth every twelve (12) hours for 60 days. Qty: 30 Tab, Refills: 1      thiamine mononitrate (B-1) 100 mg tablet Take 1 Tab by mouth daily for 60 days. Qty: 30 Tab, Refills: 1             My Recommended Diet, Activity, Wound Care, and follow-up labs are listed in the patient's Discharge Insturctions which I have personally completed and reviewed.   Risk of deterioration: Low    Condition at Discharge:  Stable  _____________________________________________________________________    Disposition  Home with family and home health services  ____________________________________________________________________    Care Plan discussed with:   Patient, Family, RN, Care Manager, Consultant    ____________________________________________________________________    Code Status: Full Code  ____________________________________________________________________      Condition at Discharge:  Stable  _____________________________________________________________________  Follow up with:   PCP : None  Follow-up Information     Follow up With Specialties Details Why Demetrius Delacruz MD Cardiology, Vascular Surgery, General and Vascular Surgery Schedule an appointment as soon as possible for a visit in 2 weeks for virtual visit [address]  [address] [facility] Ashley Handley MD Neurosurgery In 3 months to discuss possible neck surgery  [facility]  [phone]                Total time in minutes spent coordinating this discharge (includes going over instructions, follow-up, prescriptions, and preparing report for sign off to her PCP) :  35 minutes    Signed:  Kari Lofton MD

## 2020-04-29 NOTE — PROGRESS NOTES
Problem: Falls - Risk of  Goal: *Absence of Falls  Description: Document Karime Perry Fall Risk and appropriate interventions in the flowsheet.   Outcome: Progressing Towards Goal  Note: Fall Risk Interventions:  Mobility Interventions: Bed/chair exit alarm, Patient to call before getting OOB         Medication Interventions: Bed/chair exit alarm, Patient to call before getting OOB    Elimination Interventions: Bed/chair exit alarm, Call light in reach    History of Falls Interventions: Consult care management for discharge planning, Evaluate medications/consider consulting pharmacy, Room close to nurse's station         Problem: Patient Education: Go to Patient Education Activity  Goal: Patient/Family Education  Outcome: Progressing Towards Goal     Problem: Patient Education: Go to Patient Education Activity  Goal: Patient/Family Education  Outcome: Progressing Towards Goal     Problem: Ischemic Stroke: Discharge Outcomes  Goal: *Verbalizes anxiety and depression are reduced or absent  Outcome: Progressing Towards Goal  Goal: *Verbalize understanding of risk factor modification(Stroke Metric)  Outcome: Progressing Towards Goal  Goal: *Hemodynamically stable  Outcome: Progressing Towards Goal  Goal: *Absence of aspiration pneumonia  Outcome: Progressing Towards Goal  Goal: *Aware of needed dietary changes  Outcome: Progressing Towards Goal  Goal: *Verbalize understanding of prescribed medications including anti-coagulants, anti-lipid, and/or anti-platelets(Stroke Metric)  Outcome: Progressing Towards Goal  Goal: *Tolerating diet  Outcome: Progressing Towards Goal  Goal: *Aware of follow-up diagnostics related to anticoagulants  Outcome: Progressing Towards Goal  Goal: *Ability to perform ADLs and demonstrates progressive mobility and function  Outcome: Progressing Towards Goal  Goal: *Absence of DVT(Stroke Metric)  Outcome: Progressing Towards Goal  Goal: *Absence of aspiration  Outcome: Progressing Towards Goal  Goal: *Optimal pain control at patient's stated goal  Outcome: Progressing Towards Goal  Goal: *Home safety concerns addressed  Outcome: Progressing Towards Goal  Goal: *Describes available resources and support systems  Outcome: Progressing Towards Goal  Goal: *Verbalizes understanding of activation of EMS(911) for stroke symptoms(Stroke Metric)  Outcome: Progressing Towards Goal  Goal: *Understands and describes signs and symptoms to report to providers(Stroke Metric)  Outcome: Progressing Towards Goal  Goal: *Neurolgocially stable (absence of additional neurological deficits)  Outcome: Progressing Towards Goal  Goal: *Verbalizes importance of follow-up with primary care physician(Stroke Metric)  Outcome: Progressing Towards Goal  Goal: *Smoking cessation discussed,if applicable(Stroke Metric)  Outcome: Progressing Towards Goal  Goal: *Depression screening completed(Stroke Metric)  Outcome: Progressing Towards Goal     Problem: Patient Education: Go to Patient Education Activity  Goal: Patient/Family Education  Outcome: Progressing Towards Goal     Problem: Patient Education: Go to Patient Education Activity  Goal: Patient/Family Education  Outcome: Progressing Towards Goal

## 2020-04-29 NOTE — PROGRESS NOTES
Bedside shift change report given to [id number] RN (oncoming nurse) by [facility] RN (offgoing nurse).  Report included the following information SBAR and Kardex.     Zone Phone:   [phone]        Significant changes during shift:  No significant changes during shift           Patient Information     Clyde Vallejo  47 y.o.  4/23/2020 12:46 PM by Mary Alice Banks MD. [facility] admitted from ED     Problem List             Patient Active Problem List     Diagnosis Date Noted    PFO (patent foramen ovale) 04/25/2020    Cervical spinal stenosis 04/25/2020    Alcohol abuse 04/24/2020    HTN (hypertension) 04/24/2020    Hypercholesteremia 04/24/2020    Ataxia 04/24/2020    Bilateral carotid artery stenosis 04/24/2020    Hx-TIA (transient ischemic attack) 04/24/2020    History of TIAs      Bilateral leg weakness      Vertigo 04/23/2020              Past Medical History:   Diagnosis Date    Alcohol abuse      Bilateral leg weakness      Developmental venous anomaly      History of TIAs              Core Measures:     CVA: Yes Yes  CHF:No No  PNA:No No     Activity Status:     OOB to Chair Yes  Ambulated this shift Yes   Bed Rest No           DVT prophylaxis:     DVT prophylaxis Med- Yes  DVT prophylaxis SCD or CARMITA- No      Wounds: (If Applicable)     Wounds- No     Location      Patient Safety:     Falls Score Total Score: 3  Safety Level_______  Bed Alarm On? No  Sitter? No     Plan for upcoming shift: Safety and monitoring            Discharge Plan: Yes discharge TBD     Active Consults:  IP CONSULT TO NEUROLOGY  IP CONSULT TO NEUROSURGERY  IP CONSULT TO CARDIOLOGY

## 2020-04-30 PROCEDURE — 74011250637 HC RX REV CODE- 250/637: Performed by: INTERNAL MEDICINE

## 2020-04-30 PROCEDURE — 97110 THERAPEUTIC EXERCISES: CPT | Performed by: OCCUPATIONAL THERAPIST

## 2020-04-30 PROCEDURE — 97110 THERAPEUTIC EXERCISES: CPT

## 2020-04-30 PROCEDURE — 97530 THERAPEUTIC ACTIVITIES: CPT | Performed by: OCCUPATIONAL THERAPIST

## 2020-04-30 PROCEDURE — 74011250637 HC RX REV CODE- 250/637: Performed by: HOSPITALIST

## 2020-04-30 PROCEDURE — 74011250636 HC RX REV CODE- 250/636: Performed by: INTERNAL MEDICINE

## 2020-04-30 PROCEDURE — 94760 N-INVAS EAR/PLS OXIMETRY 1: CPT

## 2020-04-30 PROCEDURE — 65270000029 HC RM PRIVATE

## 2020-04-30 PROCEDURE — 87635 SARS-COV-2 COVID-19 AMP PRB: CPT

## 2020-04-30 PROCEDURE — 97116 GAIT TRAINING THERAPY: CPT

## 2020-04-30 RX ORDER — ATORVASTATIN CALCIUM 40 MG/1
40 TABLET, FILM COATED ORAL
Qty: 30 TAB | Refills: 1 | Status: SHIPPED | OUTPATIENT
Start: 2020-04-30 | End: 2020-05-04

## 2020-04-30 RX ADMIN — THERA TABS 1 TABLET: TAB at 09:11

## 2020-04-30 RX ADMIN — THIAMINE HCL TAB 100 MG 100 MG: 100 TAB at 09:11

## 2020-04-30 RX ADMIN — HEPARIN SODIUM 5000 UNITS: 5000 INJECTION INTRAVENOUS; SUBCUTANEOUS at 02:49

## 2020-04-30 RX ADMIN — FOLIC ACID 1 MG: 1 TABLET ORAL at 09:11

## 2020-04-30 RX ADMIN — METOPROLOL TARTRATE 12.5 MG: 25 TABLET, FILM COATED ORAL at 22:46

## 2020-04-30 RX ADMIN — METOPROLOL TARTRATE 12.5 MG: 25 TABLET, FILM COATED ORAL at 09:10

## 2020-04-30 RX ADMIN — ATORVASTATIN CALCIUM 40 MG: 40 TABLET, FILM COATED ORAL at 22:46

## 2020-04-30 RX ADMIN — ASPIRIN 81 MG 81 MG: 81 TABLET ORAL at 09:11

## 2020-04-30 NOTE — PROGRESS NOTES
Problem: Self Care Deficits Care Plan (Adult)  Goal: *Acute Goals and Plan of Care (Insert Text)  Description: FUNCTIONAL STATUS PRIOR TO ADMISSION: Patient was independent and active without use of DME. Pt reports being previously independent in ADLs, does not own any equipment, and has few resources. HOME SUPPORT: Patient is homeless and reports no local supports available. Pt has no equipment    Occupational Therapy Goals  Initiated 4/24/2020   1. Patient will perform standing BUE grooming with modified independence within 7 day(s). 2.  Patient will perform toilet transfers with modified independence within 7 day(s). 3.  Patient will perform all aspects of toileting with independence within 7 day(s). 4.  Patient will participate in upper extremity therapeutic exercise/activities with independence for 10 minutes within 7 day(s). 5.  Patient will utilize energy conservation techniques during functional activities with verbal cues within 7 day(s). Outcome: Progressing Towards Goal   OCCUPATIONAL THERAPY TREATMENT  Patient: Tatiana Hall (1 y.o. male)  Date: 4/30/2020  Diagnosis: Vertigo [R42]  Vertigo [R42]   Bilateral leg weakness       Precautions: Seizure  Chart, occupational therapy assessment, plan of care, and goals were reviewed. ASSESSMENT  Patient continues with skilled OT services and is progressing towards goals. Pt [facility]. Pt was seated upon arrival and agreeable to therapeutic exercises. Pt reports continued tingling in B hands and compliance with resistive foam block exercises. He reports improvements in L hand / UE strength but continues to feel L hand weakness. Perfromed warm up exercises in BUEs/trunk and provided theraputty and suggested Fine motor activities to increase L hand strength for adls. Good participation and performance with therapeutic exercises to increase strength and endurance for adls/mobility. Current Level of Function Impacting Discharge (ADLs): Supervision/CGA; uses RW    Other factors to consider for discharge: will need community services          PLAN :  Patient continues to benefit from skilled intervention to address the above impairments. Continue treatment per established plan of care. to address goals. Recommend with staff: continue to encourage adl mobility    Recommend next OT session: follow up on exercises program    Recommendation for discharge: (in order for the patient to meet his/her long term goals)  Occupational therapy at least 2 days/week in the home AND ensure assist and/or supervision for safety with adls/mobility     This discharge recommendation:  Has not yet been discussed the attending provider and/or case management    IF patient discharges home will need the following DME: tbd       SUBJECTIVE:   Patient stated It's about 50% less than the right.     OBJECTIVE DATA SUMMARY:   Cognitive/Behavioral Status:      Alert, oriented, cooperative, aware of his situation, insight into deficits                Functional Mobility and Transfers for ADLs:    Transfers:  Sit to Stand: Modified independent          Balance:  Sitting: Intact  Standing: Impaired  Standing - Static: Constant support;Good  Standing - Dynamic : Constant support; Fair    ADL Intervention:     Pt verbalizes supervision to set up for performing adls this date. Therapeutic Exercises:   Handouts provided and reviewed:  6 Pack Basic Hand exercises (for warm up and cool down). FM skills handout, theraputty handout. Worked on PNF patterns exercises seated-10 reps R and L,  B shoulder flexion/abduction pattern-10 reps, reviewed theraputty exercises.   Pt verbalized understanding      Pain:  No complaints of pain    Activity Tolerance:   Good    After treatment patient left in no apparent distress:   Sitting in chair and Call bell within reach    COMMUNICATION/COLLABORATION: The patients plan of care was discussed with: Physical therapist.     Jose Friend OTR/L  Time Calculation: 30 mins

## 2020-04-30 NOTE — PROGRESS NOTES
Problem: Mobility Impaired (Adult and Pediatric)  Goal: *Acute Goals and Plan of Care (Insert Text)  Description:   FUNCTIONAL STATUS PRIOR TO ADMISSION: Patient is homeless, unclear where he had been sleeping and does not have a car. Patient reports he ambulated without AD up until day of hospitalization. HOME SUPPORT PRIOR TO ADMISSION: Patient report no friends or family to provide assistance. Physical Therapy Goals  Initiated 4/24/2020    1. Patient will transfer from bed to chair and chair to bed with supervision/set-up using the least restrictive device within 7 day(s). 2.  Patient will perform sit to stand with supervision/set-up within 7 day(s). 3.  Patient will ambulate with supervision/set-up for 200 feet with the least restrictive device within 7 day(s). 4.  Patient will improve Monson Balance score by 7 points within 7 days. Outcome: Progressing Towards Goal   PHYSICAL THERAPY TREATMENT  Patient: Gallo Cortez (74 y.o. male)  Date: 4/30/2020  Diagnosis: Vertigo [R42]  Vertigo [R42]   Bilateral leg weakness       Precautions: Seizure  Chart, physical therapy assessment, plan of care and goals were reviewed. ASSESSMENT  Patient continues with skilled PT services and is progressing towards goals. Patient received in bed, dressed and agreeable to participate, and demonstrates bed mobility at mod indep level. Patient ambulated in hallway with RW and CGA x approx 200 feet, still with mild ataxia and requires a wide CHIVO for stability. Patient's tolerance of activity is improving, was able to perform balance and strengthening exercises standing at sink without fatigue. Patient is waiting for approval to go to [facility] for medical respite. Current Level of Function Impacting Discharge (mobility/balance):  CGA with RW for ambulation    Other factors to consider for discharge: homeless         PLAN :  Patient continues to benefit from skilled intervention to address the above impairments. Continue treatment per established plan of care. to address goals. Recommendation for discharge: (in order for the patient to meet his/her long term goals)  Outpatient physical therapy follow up recommended for strength and balance     This discharge recommendation:  Has been made in collaboration with the attending provider and/or case management    IF patient discharges home will need the following DME: rolling walker       SUBJECTIVE:   Patient stated I feel a little better today but I didn't sleep well.     OBJECTIVE DATA SUMMARY:   Critical Behavior:  Neurologic State: Alert  Orientation Level: Oriented X4  Cognition: Appropriate decision making  Safety/Judgement: Awareness of environment, Fall prevention, Home safety, Insight into deficits  Functional Mobility Training:  Bed Mobility:  Rolling: Modified independent  Supine to Sit: Modified independent              Transfers:  Sit to Stand: Modified independent  Stand to Sit: Modified independent                             Balance:  Sitting: Intact  Standing: Impaired  Standing - Static: Constant support;Good  Standing - Dynamic : Constant support; Fair  Ambulation/Gait Training:  Distance (ft): 200 Feet (ft)  Assistive Device: Gait belt;Walker, rolling  Ambulation - Level of Assistance: Contact guard assistance        Gait Abnormalities: Ataxic        Base of Support: Widened     Speed/Vandana: Pace decreased (<100 feet/min)                       Stairs: Therapeutic Exercises:   Standing at sink in room with light bilateral UE support:  narrow stance, single leg stance, marching, knee bends, and heel raises x 10  Pain Rating:      Activity Tolerance:   Good  Please refer to the flowsheet for vital signs taken during this treatment.     After treatment patient left in no apparent distress:   Sitting in chair and Call bell within reach    COMMUNICATION/COLLABORATION:   The patients plan of care was discussed with: Shona .     Gayatri Peguero, PT   Time Calculation: 24 mins

## 2020-04-30 NOTE — PROGRESS NOTES
MD contacted via Telemedi on phone as writer unable to submit consult via computer that patient's last bowel movement was on 4/22/20.  Awaiting response from MD at this time

## 2020-04-30 NOTE — PROGRESS NOTES
Problem: Falls - Risk of  Goal: *Absence of Falls  Description: Document Patricia Mello Fall Risk and appropriate interventions in the flowsheet.   Outcome: Progressing Towards Goal  Note: Fall Risk Interventions:  Mobility Interventions: Bed/chair exit alarm         Medication Interventions: Patient to call before getting OOB    Elimination Interventions: Call light in reach, Patient to call for help with toileting needs    History of Falls Interventions: Evaluate medications/consider consulting pharmacy         Problem: Patient Education: Go to Patient Education Activity  Goal: Patient/Family Education  Outcome: Progressing Towards Goal     Problem: Patient Education: Go to Patient Education Activity  Goal: Patient/Family Education  Outcome: Progressing Towards Goal     Problem: Ischemic Stroke: Discharge Outcomes  Goal: *Verbalizes anxiety and depression are reduced or absent  Outcome: Progressing Towards Goal  Goal: *Verbalize understanding of risk factor modification(Stroke Metric)  Outcome: Progressing Towards Goal  Goal: *Hemodynamically stable  Outcome: Progressing Towards Goal  Goal: *Absence of aspiration pneumonia  Outcome: Progressing Towards Goal  Goal: *Aware of needed dietary changes  Outcome: Progressing Towards Goal  Goal: *Verbalize understanding of prescribed medications including anti-coagulants, anti-lipid, and/or anti-platelets(Stroke Metric)  Outcome: Progressing Towards Goal  Goal: *Tolerating diet  Outcome: Progressing Towards Goal  Goal: *Aware of follow-up diagnostics related to anticoagulants  Outcome: Progressing Towards Goal  Goal: *Ability to perform ADLs and demonstrates progressive mobility and function  Outcome: Progressing Towards Goal  Goal: *Absence of DVT(Stroke Metric)  Outcome: Progressing Towards Goal  Goal: *Absence of aspiration  Outcome: Progressing Towards Goal  Goal: *Optimal pain control at patient's stated goal  Outcome: Progressing Towards Goal  Goal: *Home safety concerns addressed  Outcome: Progressing Towards Goal  Goal: *Describes available resources and support systems  Outcome: Progressing Towards Goal  Goal: *Verbalizes understanding of activation of EMS(911) for stroke symptoms(Stroke Metric)  Outcome: Progressing Towards Goal  Goal: *Understands and describes signs and symptoms to report to providers(Stroke Metric)  Outcome: Progressing Towards Goal  Goal: *Neurolgocially stable (absence of additional neurological deficits)  Outcome: Progressing Towards Goal  Goal: *Verbalizes importance of follow-up with primary care physician(Stroke Metric)  Outcome: Progressing Towards Goal  Goal: *Smoking cessation discussed,if applicable(Stroke Metric)  Outcome: Progressing Towards Goal  Goal: *Depression screening completed(Stroke Metric)  Outcome: Progressing Towards Goal     Problem: Patient Education: Go to Patient Education Activity  Goal: Patient/Family Education  Outcome: Progressing Towards Goal     Problem: Patient Education: Go to Patient Education Activity  Goal: Patient/Family Education  Outcome: Progressing Towards Goal

## 2020-04-30 NOTE — PROGRESS NOTES
Nutrition Assessment:    INTERVENTIONS/RECOMMENDATIONS:   Continue regular diet     ASSESSMENT:   Chart reviewed; medically noted for CVA, severe spinal stenosis, and alcohol abuse. Continues on a regular diet. No documented PO intake. Patient unavailable at time of attempted visit. Discharge planning for today noted. Continue regular diet; encourage intake of meals. Diet Order: Regular  % Eaten:  No data found. Pertinent Medications: [x] Reviewed []Other: Atorvastatin, Colace, Folic Acid, Lopressor, MVI, Thiamine, PRN Dulcolax   Pertinent Labs: [x]Reviewed  []Other:  Food Allergies: [x]None []Yes:     Last BM: 4/22   []Active     []Hyperactive  []Hypoactive       [] Absent  BS  Skin:    [x] Intact   [] Incision  [] Breakdown   []Edema   []Other:    Anthropometrics: Height: 5' 11\" (180.3 cm) Weight: 77.1 kg (170 lb)    IBW (%IBW):   ( ) UBW (%UBW):   (  %)    BMI: Body mass index is 23.71 kg/m². This BMI is indicative of:  []Underweight   [x]Normal   []Overweight   [] Obesity   [] Extreme Obesity (BMI>40)  Last Weight Metrics:  Weight Loss Metrics 4/24/2020 5/13/2010   Today's Wt 170 lb 218 lb 0.6 oz   BMI 23.71 kg/m2 30.42 kg/m2       Estimated Nutrition Needs (Based on): 2148 Kcals/day(BMR (1652) x 1. 3AF) , 77 g(-92g (1.0-1.2 g/kg bw)) Protein  Carbohydrate: At Least 130 g/day  Fluids: 2150 mL/day     Pt expected to meet estimated nutrient needs: [x]Yes []No    NUTRITION DIAGNOSES:   Problem:  No nutritional diagnosis at this time      Etiology: related to       Signs/Symptoms: as evidenced by        NUTRITION INTERVENTIONS:  Meals/Snacks: General/healthful diet                  GOAL:   PO intake >70% of meals next 5-7 days    NUTRITION MONITORING AND EVALUATION   Food/Nutrient Intake Outcomes:  Total energy intake  Physical Signs/Symptoms Outcomes: Weight/weight change    Previous Goal Met:   [x] Met              [] Progressing Towards Goal              [] Not Progressing Towards Goal   Previous Recommendations:   [x] Implemented          [] Not Implemented          [] Not Applicable    LEARNING NEEDS (Diet, Food/Nutrient-Drug Interaction):    [x] None Identified   [] Identified and Education Provided/Documented   [] Identified and Pt declined/was not appropriate     Cultural, Restoration, OR Ethnic Dietary Needs:    [x] None Identified   [] Identified and Addressed     [x] Interdisciplinary Care Plan Reviewed/Documented    [x] Discharge Planning: Regular diet    [] Participated in Interdisciplinary Rounds    NUTRITION RISK:    [] Patient At Nutritional Risk             [x] Patient Not At [address]  Ext [phone]  Pager [phone]    Weekend Pager [phone]

## 2020-04-30 NOTE — PROGRESS NOTES
Marie Barton 1527    Per nursing emergent consult    Possible constipation    EMR reviewed. Case discussed w/ nursing. Last BM 4/22/2020. Admitted w/ CVA and severe cervical stenosis w/ myelopathy.  Hx of alcohol abuse.    - dulcolax 10mg po x 1  - colace 100mg BID  - dulcolax suppository prn  - fleets mineral oil enema prn    - as above  - continue plan of care    Heladio Harvey MD  10:04 PM  4/29/2020

## 2020-04-30 NOTE — PROGRESS NOTES
Name Rayne Del Toro Age 47 y.o. MRN [id number]  1970       Chief Complaint:  stroke    Walking has improved significcantly  Again readdresse post hospital care. Insurance for him is a big problem. He is to contact the office after he is discharged for direction and options. All questions were answered. Assesment and Plan    1. Stroke  Risk factors for stroke include hyperlipidemia, hypertension, alcohol use and PFO. Discussed with Dr. Sakina Smart . He would likely benefit from non -rgent closure of possible PFO. Continue aspirin      2. Cervical myelopathy  Would recommend postponing cervical decompression for at least 3 months post stroke    3. Hyperlipidemia  Continue atorvastatin    4. Ataxia  Continue physical therapy  Would benefit from inpatient physical therapy    5. Alcohol dependence  Continue thiamine    Allergies  Patient has no known allergies.      Medications  Current Facility-Administered Medications   Medication Dose Route Frequency    bisacodyL (DULCOLAX) suppository 10 mg  10 mg Rectal DAILY PRN    mineral oil (FLEET) enema   Rectal PRN    docusate sodium (COLACE) capsule 100 mg  100 mg Oral BID    ondansetron (ZOFRAN ODT) tablet 4 mg  4 mg Oral Q6H PRN    aspirin chewable tablet 81 mg  81 mg Oral DAILY    ergocalciferol capsule 50,000 Units  50,000 Units Oral Q7D    atorvastatin (LIPITOR) tablet 40 mg  40 mg Oral QHS    thiamine mononitrate (B-1) tablet 100 mg  100 mg Oral DAILY    folic acid (FOLVITE) tablet 1 mg  1 mg Oral DAILY    therapeutic multivitamin (THERAGRAN) tablet 1 Tab  1 Tab Oral DAILY    metoprolol tartrate (LOPRESSOR) tablet 12.5 mg  12.5 mg Oral Q12H    diazePAM (VALIUM) tablet 2.5 mg  2.5 mg Oral Q1H PRN    diazePAM (VALIUM) tablet 5 mg  5 mg Oral Q1H PRN    acetaminophen (TYLENOL) tablet 650 mg  650 mg Oral Q6H PRN    heparin (porcine) injection 5,000 Units  5,000 Units SubCUTAneous Q8H        Medical History  Past Medical History:   Diagnosis Date    Alcohol abuse     Bilateral leg weakness     Developmental venous anomaly     History of TIAs        Review of Systems   Constitutional: Negative for chills and fever. HENT: Negative for ear pain. Eyes: Negative for pain and discharge. Respiratory: Negative for cough and hemoptysis. Cardiovascular: Negative for chest pain and claudication. Gastrointestinal: Negative for constipation and diarrhea. Genitourinary: Negative for flank pain and hematuria. Musculoskeletal: Negative for back pain and myalgias. Skin: Negative for itching and rash. Neurological: Positive for sensory change and weakness. Negative for headaches. Endo/Heme/Allergies: Negative for environmental allergies. Does not bruise/bleed easily. Psychiatric/Behavioral: Negative for depression and hallucinations. Exam:  Visit Vitals  /81 (BP 1 Location: Left arm, BP Patient Position: At rest;Supine)   Pulse 62   Temp 98.2 °F (36.8 °C)   Resp 16   Ht 5' 11\" (1.803 m)   Wt 170 lb (77.1 kg)   SpO2 98%   BMI 23.71 kg/m²        General: Well developed, well nourished. Patient in no apparent distress   Head: Normocephalic, atraumatic, anicteric sclera   Neck Normal ROM, No thyromegally   Lungs:  Clear to auscultation bilaterally, No wheezes or rubs   Cardiac: Regular rate and rhythm with no murmurs. Abd: Bowel sounds were audible. No tenderness on palpation   Ext: No pedal edema   Skin: Supple no rash     NeurologicExam:  Mental Status: Alert and oriented to person place and time   Speech: Fluent no aphasia or dysarthria. Cranial Nerves:  II - XII Intact   Motor:  Full and symmetric strength of upper and lower ext    Reflexes:   Deep tendon reflexes 3+/4 lower extremities no clonus. Sensory:   Symmetric and intact    Gait:  Gait is wide based    Tremor:   No tremor noted. Cerebellar:  Has difficult with heel over shin . Neurovascular: No carotid bruits.  No JVD          Lab Review  Lab Results   Component Value Date/Time    WBC 3.7 (L) 04/23/2020 01:26 PM    HCT 47.1 04/23/2020 01:26 PM    HGB 15.5 04/23/2020 01:26 PM    PLATELET 719 28/85/3690 01:26 PM       Lab Results   Component Value Date/Time    Sodium 138 04/23/2020 01:26 PM    Potassium 4.2 04/23/2020 01:26 PM    Chloride 101 04/23/2020 01:26 PM    CO2 30 04/23/2020 01:26 PM    Glucose 94 04/23/2020 01:26 PM    BUN 7 04/23/2020 01:26 PM    Creatinine 0.94 04/23/2020 01:26 PM    Calcium 8.2 (L) 04/23/2020 01:26 PM         Lab Results   Component Value Date/Time    Hemoglobin A1c 5.8 (H) 04/24/2020 04:08 AM        Lab Results   Component Value Date/Time    Vitamin B12 515 04/25/2020 05:34 PM    Folate 15.1 04/24/2020 09:16 AM       Lab Results   Component Value Date/Time    Cholesterol, total 204 (H) 04/24/2020 04:08 AM    HDL Cholesterol 109 04/24/2020 04:08 AM    LDL, calculated 83.4 04/24/2020 04:08 AM    VLDL, calculated 11.6 04/24/2020 04:08 AM    Triglyceride 58 04/24/2020 04:08 AM    CHOL/HDL Ratio 1.9 04/24/2020 04:08 AM

## 2020-04-30 NOTE — PROGRESS NOTES
Bedside shift change report given to ASHLEE Buck (oncoming nurse) by ASHLEE Dixon (offgoing nurse).  Report included the following information SBAR and Kardex.     Zone Phone:   [phone]        Significant changes during shift:  New order for bowel regime r/t possible constipation         Patient Information     Tali Ron  47 y.o.  4/23/2020 12:46 PM by Tevin Carrasco MD. [facility] admitted from ED     Problem List             Patient Active Problem List     Diagnosis Date Noted    PFO (patent foramen ovale) 04/25/2020    Cervical spinal stenosis 04/25/2020    Alcohol abuse 04/24/2020    HTN (hypertension) 04/24/2020    Hypercholesteremia 04/24/2020    Ataxia 04/24/2020    Bilateral carotid artery stenosis 04/24/2020    Hx-TIA (transient ischemic attack) 04/24/2020    History of TIAs      Bilateral leg weakness      Vertigo 04/23/2020              Past Medical History:   Diagnosis Date    Alcohol abuse      Bilateral leg weakness      Developmental venous anomaly      History of TIAs              Core Measures:     CVA: Yes Yes  CHF:No No  PNA:No No     Activity Status:     OOB to Chair Yes  Ambulated this shift Yes   Bed Rest No           DVT prophylaxis:     DVT prophylaxis Med- Yes  DVT prophylaxis SCD or CARMITA- No      Wounds: (If Applicable)     Wounds- No     Location      Patient Safety:     Falls Score Total Score: 3  Safety Level_______  Bed Alarm On? No  Sitter? No     Plan for upcoming shift: Safety and monitoring            Discharge Plan: Yes discharge TBD     Active Consults:  IP CONSULT TO NEUROLOGY  IP CONSULT TO NEUROSURGERY  IP CONSULT TO CARDIOLOGY

## 2020-04-30 NOTE — PROGRESS NOTES
Hospitalist Progress Note    NAME: So Carmen   :  1970   MRN:  [id number]     Interim Hospital Summary: 47 y.o. male whom presented on 2020 with      Assessment / Plan:    CVA  PFO  Ataxia or gait difficulty  -MRI brain:  IMPRESSION:   Focal restricted diffusion and T2 hyperintensity in the splenium of the corpus callosum. Mild generalized volume loss.  -Echo EF 55-60%, positive bubble study demonstrating right to left atrial shunt / PFO  -LE duplex no DVT  -Follow up with Dr Kameron Pope for non urgent closure of possible PFO  -needs outpatient or home PT   -continue ASA  -DC planning. Discussed with CM, patient is homeless and was stating in a hotel prior to this admission. 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 He used to stay at a shelter in [facility]. Plan is to discharge to [facility]. Checking COVID-19 as a requirement for placement. Pt does not have signs or symtpoms of COVID-19. No ISOLATION needed. Cervical myelopathy  -MRI C spine  1. C4-C5 severe central spinal canal stenosis, cord compression, and cord  myelomalacia more likely than cord contusion. 2. C6-C7 moderate central spinal canal stenosis. 3. Degenerative disease is superimposed on a congenitally slender cervical  spinal canal. Stenoses are detailed above.  -needs to follow up with NSGY for elective cervical decompression in 3 months    ETOH abuse  -continue thiamine and folate. No issues with withdrawal or DT  -he says that he used to drink heavier    HLD  -continue lipitor      18.5 - 24.9 Normal weight / Body mass index is 23.71 kg/m². Code status: Full  Prophylaxis: Hep SQ  Recommended Disposition:  PT, OT, RN     Plan is to discharge to [facility]. Checking COVID-19 as a requirement for placement. Pt does not have signs or symtpoms of COVID-19. No ISOLATION needed. Subjective:     Chief Complaint / Reason for Physician Visit  Follow up of CVA, cervical myelopathy, ETOH, HTN  Chart reviewed in detail. Discussed with RN events overnight. No issues overnight    Review of Systems:  Symptom Y/N Comments  Symptom Y/N Comments   Fever/Chills    Chest Pain     Poor Appetite    Edema     Cough    Abdominal Pain     Sputum    Joint Pain     SOB/DIAZ    Pruritis/Rash     Nausea/vomit    Tolerating PT/OT     Diarrhea    Tolerating Diet     Constipation    Other       Could NOT obtain due to:      PO intake: No data found. Objective:     VITALS:   Last 24hrs VS reviewed since prior progress note. Most recent are:  Patient Vitals for the past 24 hrs:   Temp Pulse Resp BP SpO2   04/30/20 1124 98.6 °F (37 °C) 60 16 125/79 98 %   04/30/20 0727 98.2 °F (36.8 °C) 62 16 126/81 98 %   04/30/20 0349 98 °F (36.7 °C) (!) 56 18 136/86 99 %   04/30/20 0003 97.9 °F (36.6 °C) (!) 52 18 121/75 99 %   04/29/20 1851 98.1 °F (36.7 °C) 63 18 134/88 100 %     No intake or output data in the 24 hours ending 04/30/20 1515     PHYSICAL EXAM:  General: WD, WN. Alert, cooperative, no acute distress    EENT:  EOMI. Anicteric sclerae. MMM  Resp:  CTA bilaterally, no wheezing or rales. No accessory muscle use  CV:  Regular  rhythm,  No edema  GI:  Soft, Non distended, Non tender.  +Bowel sounds  Neurologic:  Alert and oriented X 3, normal speech,   Psych:   Good insight. Not anxious nor agitated  Skin:  No rashes.   No jaundice    Reviewed most current lab test results and cultures  YES  Reviewed most current radiology test results   YES  Review and summation of old records today    NO  Reviewed patient's current orders and MAR    YES  PMH/SH reviewed - no change compared to H&P  ________________________________________________________________________  Care Plan discussed with:    Comments   Patient x    Family      RN x    Care Manager     Consultant                        Multidiciplinary team rounds were held today with , nursing, pharmacist and clinical coordinator. Patient's plan of care was discussed; medications were reviewed and discharge planning was addressed. ________________________________________________________________________  Total NON critical care TIME:   25   Minutes    Total CRITICAL CARE TIME Spent:   Minutes non procedure based      Comments   >50% of visit spent in counseling and coordination of care x     This includes time during multidisciplinary rounds if indicated above   ________________________________________________________________________  Dorrie Schlatter, MD     Procedures: see electronic medical records for all procedures/Xrays and details which were not copied into this note but were reviewed prior to creation of Plan. LABS:  I reviewed today's most current labs and imaging studies. Pertinent labs include:  No results for input(s): WBC, HGB, HCT, PLT, HGBEXT, HCTEXT, PLTEXT, HGBEXT, HCTEXT, PLTEXT in the last 72 hours. No results for input(s): NA, K, CL, CO2, GLU, BUN, CREA, CA, MG, PHOS, ALB, TBIL, TBILI, SGOT, ALT, INR, INREXT, INREXT in the last 72 hours.

## 2020-05-01 PROCEDURE — 74011250637 HC RX REV CODE- 250/637: Performed by: INTERNAL MEDICINE

## 2020-05-01 PROCEDURE — 97116 GAIT TRAINING THERAPY: CPT

## 2020-05-01 PROCEDURE — 97110 THERAPEUTIC EXERCISES: CPT

## 2020-05-01 PROCEDURE — 65270000029 HC RM PRIVATE

## 2020-05-01 PROCEDURE — 74011250637 HC RX REV CODE- 250/637: Performed by: HOSPITALIST

## 2020-05-01 PROCEDURE — 94760 N-INVAS EAR/PLS OXIMETRY 1: CPT

## 2020-05-01 PROCEDURE — 97535 SELF CARE MNGMENT TRAINING: CPT

## 2020-05-01 RX ADMIN — THERA TABS 1 TABLET: TAB at 09:56

## 2020-05-01 RX ADMIN — METOPROLOL TARTRATE 12.5 MG: 25 TABLET, FILM COATED ORAL at 09:56

## 2020-05-01 RX ADMIN — THIAMINE HCL TAB 100 MG 100 MG: 100 TAB at 09:56

## 2020-05-01 RX ADMIN — DOCUSATE SODIUM 100 MG: 100 CAPSULE, LIQUID FILLED ORAL at 09:56

## 2020-05-01 RX ADMIN — ASPIRIN 81 MG 81 MG: 81 TABLET ORAL at 09:56

## 2020-05-01 RX ADMIN — ATORVASTATIN CALCIUM 40 MG: 40 TABLET, FILM COATED ORAL at 21:48

## 2020-05-01 RX ADMIN — FOLIC ACID 1 MG: 1 TABLET ORAL at 09:56

## 2020-05-01 RX ADMIN — METOPROLOL TARTRATE 12.5 MG: 25 TABLET, FILM COATED ORAL at 21:46

## 2020-05-01 NOTE — PROGRESS NOTES
YVONNE:    [facility]  DME   Transport     UPDATE: 3:55PM    CM received call from Debi, from [employer]. Debi aware that COVID test is currently pending. Once COVID test returns CM was asked to email Debi at: [email or web address].    CM will continue to follow up. VIBHA Javed Arm, [place]        CM: Tello [facility] is currently working with pt in the Neuro Unit. Pt received rolling walker that was ordered on 4/30/20. CM staffed case with MD, and it was reported that the [facility] accepted pt, and are willing to take him on 5/3/20, because they do not have admissions on weekends. CM will continue to follow.   Pt will require transport at the time of d/c.    VIBHA Javed Arm, [address] [place] [address]

## 2020-05-01 NOTE — PROGRESS NOTES
Hospitalist Progress Note    NAME: Gallo Cortez   :  1970   MRN:  [id number]     Interim Hospital Summary: 47 y.o. male whom presented on 2020 with      Assessment / Plan:    CVA  PFO  Ataxia or gait difficulty  -MRI brain:  IMPRESSION:   Focal restricted diffusion and T2 hyperintensity in the splenium of the corpus callosum. Mild generalized volume loss.  -Echo EF 55-60%, positive bubble study demonstrating right to left atrial shunt / PFO  -LE duplex no DVT  -Follow up with Dr Uriel Reid for non urgent closure of possible PFO  -needs outpatient or home PT   -continue ASA  -DC planning. Discussed with CM, patient is homeless and was stating in a hotel prior to this admission. 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 He used to stay at a shelter in [facility]. Plan is to discharge to [facility]. Checking COVID-19 as a possible requirement for placement. Pt does not have signs or symtpoms of COVID-19. No ISOLATION needed usually. But due to hospital Policy ? may need to place on ISOLATION. Cervical myelopathy  -MRI C spine  1. C4-C5 severe central spinal canal stenosis, cord compression, and cord  myelomalacia more likely than cord contusion. 2. C6-C7 moderate central spinal canal stenosis. 3. Degenerative disease is superimposed on a congenitally slender cervical  spinal canal. Stenoses are detailed above.  -needs to follow up with NSGY for elective cervical decompression in 3 months    ETOH abuse  -continue thiamine and folate. No issues with withdrawal or DT  -he says that he used to drink heavier    HLD  -continue lipitor      18.5 - 24.9 Normal weight / Body mass index is 23.71 kg/m². Code status: Full  Prophylaxis: Hep SQ  Recommended Disposition:  PT, OT, RN     Plan is to discharge to [facility]. Appreciate help from .  Pt would benefit from placement at [facility] as its not a safe discharge at send to shelter due to recent CVA and need for Home PT/Outpatient PT.   - has contacted [facility] yesterday to check status and they said they may need COVID test before accepting him. To expedite process , ordered it yesterday. Hopefully DC soon. Elenor Sensor at [facility] at his cell number [phone], they need COVID test before they accept him. They dont accept pt on weekend. So plan for DC on Monday. COVID test  Done this Am, hopefully we should have result tomorrow AM     Subjective:     Chief Complaint / Reason for Physician Visit  Follow up of CVA, cervical myelopathy, ETOH, HTN  Chart reviewed in detail. Discussed with RN events overnight. No issues overnight. Review of Systems:  Symptom Y/N Comments  Symptom Y/N Comments   Fever/Chills n   Chest Pain n    Poor Appetite n   Edema n    Cough n   Abdominal Pain n    Sputum    Joint Pain     SOB/DIAZ    Pruritis/Rash     Nausea/vomit    Tolerating PT/OT     Diarrhea    Tolerating Diet     Constipation    Other       Could NOT obtain due to:      PO intake: No data found. Objective:     VITALS:   Last 24hrs VS reviewed since prior progress note. Most recent are:  Patient Vitals for the past 24 hrs:   Temp Pulse Resp BP SpO2   05/01/20 0740 97.4 °F (36.3 °C) (!) 58 16 112/74 100 %   05/01/20 0428 97.6 °F (36.4 °C) 60 16 116/72 100 %   04/30/20 2329 98 °F (36.7 °C) (!) 59 16 110/64 100 %   04/30/20 1853 98 °F (36.7 °C) 60 16 144/86 97 %   04/30/20 1526 98.4 °F (36.9 °C) (!) 56 16 140/87    04/30/20 1124 98.6 °F (37 °C) 60 16 125/79 98 %     No intake or output data in the 24 hours ending 05/01/20 1039     PHYSICAL EXAM:  General: WD, WN. Alert, cooperative, no acute distress    EENT:  EOMI. Anicteric sclerae. MMM  Resp:  CTA bilaterally, no wheezing or rales.   No accessory muscle use  CV:  Regular  rhythm,  No edema  GI:  Soft, Non distended, Non tender.  +Bowel sounds  Neurologic:  Alert and oriented X 3, normal speech,   Psych:   Good insight. Not anxious nor agitated  Skin:  No rashes. No jaundice    Reviewed most current lab test results and cultures  YES  Reviewed most current radiology test results   YES  Review and summation of old records today    NO  Reviewed patient's current orders and MAR    YES  PMH/SH reviewed - no change compared to H&P  ________________________________________________________________________  Care Plan discussed with:    Comments   Patient x    Family      RN x    Care Manager     Consultant                        Multidiciplinary team rounds were held today with , nursing, pharmacist and clinical coordinator. Patient's plan of care was discussed; medications were reviewed and discharge planning was addressed. ________________________________________________________________________  Total NON critical care TIME:   25   Minutes    Total CRITICAL CARE TIME Spent:   Minutes non procedure based      Comments   >50% of visit spent in counseling and coordination of care x     This includes time during multidisciplinary rounds if indicated above   ________________________________________________________________________  Javy Masterson MD     Procedures: see electronic medical records for all procedures/Xrays and details which were not copied into this note but were reviewed prior to creation of Plan. LABS:  I reviewed today's most current labs and imaging studies. Pertinent labs include:  No results for input(s): WBC, HGB, HCT, PLT, HGBEXT, HCTEXT, PLTEXT, HGBEXT, HCTEXT, PLTEXT in the last 72 hours. No results for input(s): NA, K, CL, CO2, GLU, BUN, CREA, CA, MG, PHOS, ALB, TBIL, TBILI, SGOT, ALT, INR, INREXT, INREXT in the last 72 hours.

## 2020-05-01 NOTE — PROGRESS NOTES
Made aware by Say Barnett that no sample for Covid testing had been received for this patient. Sample had been collected before noon by ED nurse. Sample found left on WOW in patient's room without label,    Contacted Lab, confirmed viability of sample, then labelled and submitted (via tube system) sample.

## 2020-05-01 NOTE — PROGRESS NOTES
Physical Therapy    Chart reviewed and attempted to see patient, he was  dressed and up in chair and reports he has been up ad renae in room without AD. Patient is pending COVID test required to go to [facility], so not able to ambulate in simon today, but patient demonstrated safe ambulation in room. Patient has received RW and I adjusted it to correct height, encouraged him to ambulate in hallway with nursing once COVID test is negative. Plan to sign off today and patient is expected to discharge on Monday.      Flower Thompson

## 2020-05-01 NOTE — PROGRESS NOTES
Notified by Ridge Slaughter that Droplet Plus precautions may be removed, as patient is being ruled out ONLY on the basis of a prerequisite for placement.

## 2020-05-01 NOTE — PROGRESS NOTES
Bedside shift change report given to ASHLEE Buck (oncoming nurse) by ASHLEE Gorman (offgoing nurse).  Report included the following information SBAR and Kardex.     Zone OITKV:   [id number]        Significant changes during shift:  None reports having 4 bowel movements on day shift 4/30/20        Patient Information     Arley Barnett  47 y.o.  4/23/2020 12:46 PM by Tyler Prakash MD. [facility] admitted from ED     Problem List             Patient Active Problem List     Diagnosis Date Noted    PFO (patent foramen ovale) 04/25/2020    Cervical spinal stenosis 04/25/2020    Alcohol abuse 04/24/2020    HTN (hypertension) 04/24/2020    Hypercholesteremia 04/24/2020    Ataxia 04/24/2020    Bilateral carotid artery stenosis 04/24/2020    Hx-TIA (transient ischemic attack) 04/24/2020    History of TIAs      Bilateral leg weakness      Vertigo 04/23/2020              Past Medical History:   Diagnosis Date    Alcohol abuse      Bilateral leg weakness      Developmental venous anomaly      History of TIAs              Core Measures:     CVA: Yes Yes  CHF:No No  PNA:No No     Activity Status:     OOB to Chair Yes  Ambulated this shift Yes   Bed Rest No           DVT prophylaxis:     DVT prophylaxis Med- Yes  DVT prophylaxis SCD or CARMITA- No      Wounds: (If Applicable)     Wounds- No     Location      Patient Safety:     Falls Score Total Score: 3  Safety Level_______  Bed Alarm On? No  Sitter? No     Plan for upcoming shift: Safety and monitoring            Discharge Plan: Yes discharge TBD     Active Consults:  IP CONSULT TO NEUROLOGY  IP CONSULT TO NEUROSURGERY  IP CONSULT TO CARDIOLOGY

## 2020-05-01 NOTE — PROGRESS NOTES
Problem: Self Care Deficits Care Plan (Adult)  Goal: *Acute Goals and Plan of Care (Insert Text)  Description: FUNCTIONAL STATUS PRIOR TO ADMISSION: Patient was independent and active without use of DME. Pt reports being previously independent in ADLs, does not own any equipment, and has few resources. HOME SUPPORT: Patient is homeless and reports no local supports available. Pt has no equipment. Occupational Therapy Goals  Initiated 4/24/2020 - ALL GOALS MET (Pt did not perform all goal tasks, however based on observation of pt functional performance and mobility this session, infer that pt would be able to meet all goals at this time and has no further acute OT needs.)  1. Patient will perform standing BUE grooming with modified independence within 7 day(s). 2.  Patient will perform toilet transfers with modified independence within 7 day(s). 3.  Patient will perform all aspects of toileting with independence within 7 day(s). 4.  Patient will participate in upper extremity therapeutic exercise/activities with independence for 10 minutes within 7 day(s). 5.  Patient will utilize energy conservation techniques during functional activities with verbal cues within 7 day(s). 5/1/2020 1628 by Yoselin Hong OT  Outcome: Resolved/Met     OCCUPATIONAL THERAPY TREATMENT/DISCHARGE  Patient: Rhona Harvey (42 y.o. male)  Date: 5/1/2020  Diagnosis: Vertigo [R42]  Vertigo [R42]   Bilateral leg weakness       Precautions: Seizure  Chart, occupational therapy assessment, plan of care, and goals were reviewed. ASSESSMENT  Patient continues with skilled OT services and has progressed towards goals. This session, pt completely made up his bed. Pt with no LOB noted throughout task, despite not using RW to ambulate around bed. Pt with good dynamic balance, occasionally using bed rail for stability to facilitate greater reach to opposite side of bed. Pt with excellent activity tolerance.  Reviewed and completed HEP for hand strengthening/endurance, using handout as visual reference. Provided pt with additional blue sponge and green sponge to facilitate equivalent bilateral strengthening and to allow pt to progress to greater resistance as able. Pt verbalized and demonstrated good understanding of education provided. Per pt functional performance and mobility observed this session, infer pt would successfully meet all goals and has no further acute OT needs. Per pt chart, plan is to discharge to [facility] on Monday. Will complete OT order at this time. Current Level of Function (ADLs/self-care): modified independent - independent    Other factors to consider for discharge: pt is homeless; has limited resources available (no car, etc.)         PLAN :  Rationale for discharge: Goals achieved  Recommendation for discharge: (in order for the patient to meet his/her long term goals)  Outpatient occupational therapy follow up recommended for greater UE strengthening/endurance and improved work tolerance     This discharge recommendation:  Has been made in collaboration with the attending provider and/or case management    IF patient discharges home will need the following DME:none       SUBJECTIVE:   Patient stated Bella Diehl, this is really good\" when simultaneously completing B hand exercises with blue sponge. OBJECTIVE DATA SUMMARY:   Cognitive/Behavioral Status:  Neurologic State: Alert; Appropriate for age  Orientation Level: Oriented X4  Cognition: Appropriate decision making; Appropriate for age attention/concentration; Appropriate safety awareness; Follows commands  Perception: Appears intact  Perseveration: No perseveration noted  Safety/Judgement: Awareness of environment; Fall prevention;Good awareness of safety precautions; Home safety; Insight into deficits    Functional Mobility and Transfers for ADLs:  Transfers:  Sit to Stand: Modified independent   Stand to Sit: Modified independent    Balance:  Sitting: Intact  Standing: Impaired  Standing - Static: Good  Standing - Dynamic : Fair    ADL Intervention:  Feeding  Feeding Assistance: Independent  Container Management: Independent  Drink to Mouth: Independent    Cognitive Retraining  Safety/Judgement: Awareness of environment; Fall prevention;Good awareness of safety precautions; Home safety; Insight into deficits    Therapeutic Exercises:   Reviewed HEP for hand dexterity and strengthening. Pt reports he has been doing \"some\" of the exercises but \"needs to get better\" at performing the others as well. Pt reports already feeling stronger after several days of exercises. This session, pt able to perform exercises with minimal to no cues needed for successful performance, using handouts as visual reference. To facilitate equivalent bilateral strengthening, provided pt with additional blue sponge so he may perform R and L hand exercises simultaneously. Also provided pt with green sponge so he may begin to incorporate greater resistance into his routine and improve overall hand strength/endurance for improved ADL and IADL participation. Pain:  Pt with no reports of pain. Activity Tolerance:   WNL and Good  Please refer to the flowsheet for vital signs taken during this treatment. After treatment patient left in no apparent distress:   Sitting in chair and Call bell within reach    COMMUNICATION/COLLABORATION:   The patients plan of care was discussed with: Physical therapist and Registered nurse.      Basim Holt OT  Time Calculation: 27 mins

## 2020-05-02 PROCEDURE — 74011250637 HC RX REV CODE- 250/637: Performed by: HOSPITALIST

## 2020-05-02 PROCEDURE — 74011250637 HC RX REV CODE- 250/637: Performed by: INTERNAL MEDICINE

## 2020-05-02 PROCEDURE — 65270000029 HC RM PRIVATE

## 2020-05-02 PROCEDURE — 74011250636 HC RX REV CODE- 250/636: Performed by: INTERNAL MEDICINE

## 2020-05-02 PROCEDURE — 74011250637 HC RX REV CODE- 250/637: Performed by: NEUROLOGICAL SURGERY

## 2020-05-02 RX ADMIN — HEPARIN SODIUM 5000 UNITS: 5000 INJECTION INTRAVENOUS; SUBCUTANEOUS at 10:32

## 2020-05-02 RX ADMIN — FOLIC ACID 1 MG: 1 TABLET ORAL at 09:15

## 2020-05-02 RX ADMIN — ASPIRIN 81 MG 81 MG: 81 TABLET ORAL at 09:16

## 2020-05-02 RX ADMIN — HEPARIN SODIUM 5000 UNITS: 5000 INJECTION INTRAVENOUS; SUBCUTANEOUS at 18:04

## 2020-05-02 RX ADMIN — THIAMINE HCL TAB 100 MG 100 MG: 100 TAB at 09:15

## 2020-05-02 RX ADMIN — ATORVASTATIN CALCIUM 40 MG: 40 TABLET, FILM COATED ORAL at 21:41

## 2020-05-02 RX ADMIN — ERGOCALCIFEROL 50000 UNITS: 1.25 CAPSULE ORAL at 09:15

## 2020-05-02 RX ADMIN — METOPROLOL TARTRATE 12.5 MG: 25 TABLET, FILM COATED ORAL at 09:15

## 2020-05-02 RX ADMIN — THERA TABS 1 TABLET: TAB at 09:15

## 2020-05-02 NOTE — PROGRESS NOTES
Hospitalist Progress Note    NAME: Carlos Castaneda   :  1970   MRN:  [id number]     Interim Hospital Summary: 47 y.o. male whom presented on 2020 with      Assessment / Plan:    CVA  PFO  Ataxia or gait difficulty  -MRI brain:  IMPRESSION:   Focal restricted diffusion and T2 hyperintensity in the splenium of the corpus callosum. Mild generalized volume loss.  -Echo EF 55-60%, positive bubble study demonstrating right to left atrial shunt / PFO  -LE duplex no DVT  -Follow up with Dr Eduardo Yeung for non urgent closure of possible PFO  -needs outpatient or home PT   -continue ASA  -DC planning. Discussed with CM, patient is homeless and was stating in a hotel prior to this admission. 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 He used to stay at a shelter in [facility]. Plan is to discharge to [facility]. Checking COVID-19 as a possible requirement for placement. Pt does not have signs or symtpoms of COVID-19. No ISOLATION needed usually. Cervical myelopathy  -MRI C spine  1. C4-C5 severe central spinal canal stenosis, cord compression, and cord  myelomalacia more likely than cord contusion. 2. C6-C7 moderate central spinal canal stenosis. 3. Degenerative disease is superimposed on a congenitally slender cervical  spinal canal. Stenoses are detailed above.  -needs to follow up with NSGY for elective cervical decompression in 3 months    ETOH abuse  -continue thiamine and folate. No issues with withdrawal or DT  -he says that he used to drink heavier    HLD  -continue lipitor      18.5 - 24.9 Normal weight / Body mass index is 23.71 kg/m². Code status: Full  Prophylaxis: Hep SQ  Recommended Disposition: HH PT, OT, RN     Plan is to discharge to [facility]. Appreciate help from .  Pt would benefit from placement at [facility] as its not a safe discharge at send to shelter due to recent CVA and need for Home PT/Outpatient PT. Lawrence Bolds at [facility] at his cell number [phone], they need COVID test before they accept him. They dont accept pt on weekend. So plan for DC on Monday. Awaiting COVID test  Hopefully DC Monday AM     Subjective:     Chief Complaint / Reason for Physician Visit  Follow up of CVA, cervical myelopathy, ETOH, HTN  Chart reviewed in detail. Discussed with RN events overnight. No issues overnight. Review of Systems:  Symptom Y/N Comments  Symptom Y/N Comments   Fever/Chills n   Chest Pain n    Poor Appetite n   Edema n    Cough n   Abdominal Pain n    Sputum    Joint Pain     SOB/DIAZ    Pruritis/Rash     Nausea/vomit    Tolerating PT/OT     Diarrhea    Tolerating Diet     Constipation    Other       Could NOT obtain due to:      PO intake:   Patient Vitals for the past 72 hrs:   % Diet Eaten   05/02/20 1310 100 %   05/02/20 0919 75 %     Objective:     VITALS:   Last 24hrs VS reviewed since prior progress note. Most recent are:  Patient Vitals for the past 24 hrs:   Temp Pulse Resp BP SpO2   05/02/20 1218 97.8 °F (36.6 °C) 60 16 135/84 100 %   05/02/20 0748 98.9 °F (37.2 °C) 60 18 122/79 98 %   05/02/20 0335 98.2 °F (36.8 °C) (!) 55 16 127/78 97 %   05/01/20 2248 98.5 °F (36.9 °C) 66 17 128/83 99 %   05/01/20 1941 97.8 °F (36.6 °C) 64 18 131/84 98 %       Intake/Output Summary (Last 24 hours) at 5/2/2020 1532  Last data filed at 5/2/2020 1310  Gross per 24 hour   Intake 480 ml   Output    Net 480 ml        PHYSICAL EXAM:  General: WD, WN. Alert, cooperative, no acute distress    EENT:  EOMI. Anicteric sclerae. MMM  Resp:  CTA bilaterally, no wheezing or rales. No accessory muscle use  CV:  Regular  rhythm,  No edema  GI:  Soft, Non distended, Non tender.  +Bowel sounds  Neurologic:  Alert and oriented X 3, normal speech,   Psych:   Good insight. Not anxious nor agitated  Skin:  No rashes.   No jaundice    Reviewed most current lab test results and cultures  YES  Reviewed most current radiology test results   YES  Review and summation of old records today    NO  Reviewed patient's current orders and MAR    YES  PMH/SH reviewed - no change compared to H&P  ________________________________________________________________________  Care Plan discussed with:    Comments   Patient x    Family      RN x    Care Manager     Consultant                        Multidiciplinary team rounds were held today with , nursing, pharmacist and clinical coordinator. Patient's plan of care was discussed; medications were reviewed and discharge planning was addressed. ________________________________________________________________________  Total NON critical care TIME:   10   Minutes    Total CRITICAL CARE TIME Spent:   Minutes non procedure based      Comments   >50% of visit spent in counseling and coordination of care x     This includes time during multidisciplinary rounds if indicated above   ________________________________________________________________________  Kari Lofton MD     Procedures: see electronic medical records for all procedures/Xrays and details which were not copied into this note but were reviewed prior to creation of Plan. LABS:  I reviewed today's most current labs and imaging studies. Pertinent labs include:  No results for input(s): WBC, HGB, HCT, PLT, HGBEXT, HCTEXT, PLTEXT, HGBEXT, HCTEXT, PLTEXT in the last 72 hours. No results for input(s): NA, K, CL, CO2, GLU, BUN, CREA, CA, MG, PHOS, ALB, TBIL, TBILI, SGOT, ALT, INR, INREXT, INREXT in the last 72 hours.

## 2020-05-02 NOTE — PROGRESS NOTES
Patient presents compliant to current plan of care as directed.    Rocio Carrion BSN, RN   5/2/2020  3:15 PM

## 2020-05-02 NOTE — PROGRESS NOTES
Bedside shift change report given to [address] (oncoming nurse) by Kyra Noriega. Report included the following information Desiree. [facility] Phone:  [phone]      Significant changes during shift: None        Patient Information    Alhaji Marmolejo  47 y.o.  4/23/2020 12:46 PM by Daron Ma MD. Alhaji Marmolejo was admitted from Home    Problem List    Patient Active Problem List    Diagnosis Date Noted    PFO (patent foramen ovale) 04/25/2020    Cervical spinal stenosis 04/25/2020    Alcohol abuse 04/24/2020    HTN (hypertension) 04/24/2020    Hypercholesteremia 04/24/2020    Ataxia 04/24/2020    Bilateral carotid artery stenosis 04/24/2020    Hx-TIA (transient ischemic attack) 04/24/2020    History of TIAs     Bilateral leg weakness     Vertigo 04/23/2020     Past Medical History:   Diagnosis Date    Alcohol abuse     Bilateral leg weakness     Developmental venous anomaly     History of TIAs          Core Measures:    CVA: No No  CHF:No No  PNA:No No    Post Op Surgical (If Applicable):     Number times ambulated in hallway past shift:  None   Number of times OOB to chair past shift:   Twice  NG Tube: No  Incentive Spirometer: No  Drains: No   Volume n/a  Dressing Present:  No  Flatus:  Refused    Activity Status:    OOB to Chair Yes  Ambulated this shift Yes   Bed Rest No    Supplemental O2: (If Applicable)    NC No  NRB No  Venti-mask No  On 0 Liters/min      LINES AND DRAINS:    Central Line? Not applicable Placement date N/A Reason Medically Necessary N/A    PICC LINE? Not applicable Placement date N/A Reason Medically Necessary N/A    Urinary Catheter? Not applicable Placement Date N/A Reason Medically Necessary N/A     DVT prophylaxis:    DVT prophylaxis Med- Yes (SQ Heparin)  DVT prophylaxis SCD or CARMITA- Not applicable     Wounds: (If Applicable)    Wounds- Not applicable    Location N?A     Patient Safety:    Falls Score Total Score: 2  Safety Level_______  Bed Alarm On? NO  Sitter?  No    Plan for upcoming shift:         Discharge Plan: Yes -Discharge upon Negative COVID Screen result.      Active Consults:  IP CONSULT TO NEUROLOGY  IP CONSULT TO NEUROSURGERY  IP CONSULT TO CARDIOLOGY

## 2020-05-02 NOTE — PROGRESS NOTES
Problem: Falls - Risk of  Goal: *Absence of Falls  Description: Document Lachelle Darlin Fall Risk and appropriate interventions in the flowsheet.   Outcome: Progressing Towards Goal  Note: Fall Risk Interventions:  Mobility Interventions: Bed/chair exit alarm         Medication Interventions: Bed/chair exit alarm    Elimination Interventions: Bed/chair exit alarm    History of Falls Interventions: Bed/chair exit alarm

## 2020-05-02 NOTE — PROGRESS NOTES
* No surgery found *  * No surgery found *  Bedside and Verbal shift change report given to [place] (oncoming nurse) by Ana Manriquez RN (offgoing nurse). Report included the following information SBAR and MAR. Zone Phone:   [phone]      Significant changes during shift:  none        Patient Information    Raisa Ledezma  47 y.o.  4/23/2020 12:46 PM by Sheila Dolan MD. Raisa Ledezma was admitted from Home    Problem List    Patient Active Problem List    Diagnosis Date Noted    PFO (patent foramen ovale) 04/25/2020    Cervical spinal stenosis 04/25/2020    Alcohol abuse 04/24/2020    HTN (hypertension) 04/24/2020    Hypercholesteremia 04/24/2020    Ataxia 04/24/2020    Bilateral carotid artery stenosis 04/24/2020    Hx-TIA (transient ischemic attack) 04/24/2020    History of TIAs     Bilateral leg weakness     Vertigo 04/23/2020     Past Medical History:   Diagnosis Date    Alcohol abuse     Bilateral leg weakness     Developmental venous anomaly     History of TIAs          Core Measures:    CVA: No Not applicable  CHF:No Not applicable  PNA:No Not applicable    Activity Status:    OOB to Chair No  Ambulated this shift Yes   Bed Rest No    Supplemental O2: (If Applicable)    NC No  NRB No  Venti-mask No  On room air Liters/min      LINES AND DRAINS:      DVT prophylaxis:    DVT prophylaxis Med- No  DVT prophylaxis SCD or CARMITA- No     Wounds: (If Applicable)    Wounds- No    Location none    Patient Safety:    Falls Score Total Score: 1  Safety Level_______  Bed Alarm On? No  Sitter?  No    Plan for upcoming shift: safety, waiting for negative COVID test so he can be sent to the [facility] Monday         Discharge Plan: Yes discharge to [facility] Monday with negative COVID    Active Consults:  IP CONSULT TO NEUROLOGY  IP CONSULT TO NEUROSURGERY  IP CONSULT TO CARDIOLOGY

## 2020-05-03 LAB
SARS-COV-2, COV2: NOT DETECTED
SPECIMEN SOURCE, FCOV2M: NORMAL

## 2020-05-03 PROCEDURE — 74011250636 HC RX REV CODE- 250/636: Performed by: INTERNAL MEDICINE

## 2020-05-03 PROCEDURE — 65270000029 HC RM PRIVATE

## 2020-05-03 PROCEDURE — 74011250637 HC RX REV CODE- 250/637: Performed by: INTERNAL MEDICINE

## 2020-05-03 PROCEDURE — 74011250637 HC RX REV CODE- 250/637: Performed by: HOSPITALIST

## 2020-05-03 RX ADMIN — HEPARIN SODIUM 5000 UNITS: 5000 INJECTION INTRAVENOUS; SUBCUTANEOUS at 03:07

## 2020-05-03 RX ADMIN — ASPIRIN 81 MG 81 MG: 81 TABLET ORAL at 09:31

## 2020-05-03 RX ADMIN — METOPROLOL TARTRATE 12.5 MG: 25 TABLET, FILM COATED ORAL at 22:09

## 2020-05-03 RX ADMIN — FOLIC ACID 1 MG: 1 TABLET ORAL at 09:31

## 2020-05-03 RX ADMIN — HEPARIN SODIUM 5000 UNITS: 5000 INJECTION INTRAVENOUS; SUBCUTANEOUS at 18:35

## 2020-05-03 RX ADMIN — HEPARIN SODIUM 5000 UNITS: 5000 INJECTION INTRAVENOUS; SUBCUTANEOUS at 11:33

## 2020-05-03 RX ADMIN — ATORVASTATIN CALCIUM 40 MG: 40 TABLET, FILM COATED ORAL at 22:09

## 2020-05-03 RX ADMIN — DOCUSATE SODIUM 100 MG: 100 CAPSULE, LIQUID FILLED ORAL at 18:35

## 2020-05-03 RX ADMIN — METOPROLOL TARTRATE 12.5 MG: 25 TABLET, FILM COATED ORAL at 09:31

## 2020-05-03 RX ADMIN — THIAMINE HCL TAB 100 MG 100 MG: 100 TAB at 09:31

## 2020-05-03 RX ADMIN — DOCUSATE SODIUM 100 MG: 100 CAPSULE, LIQUID FILLED ORAL at 09:31

## 2020-05-03 RX ADMIN — THERA TABS 1 TABLET: TAB at 09:31

## 2020-05-03 NOTE — PROGRESS NOTES
Patient presents compliant to current plan of care as directed.    Ashley PAEZN, RN   5/3/2020  3:41 pm

## 2020-05-03 NOTE — PROGRESS NOTES
Bedside shift change report given to Ousmane [address]  By [place]. Report included the following information Kardex. Zone Phone:   [phone]      Significant changes during shift:  No significant changes        Patient Information    Jessica Krishnamurthy  47 y.o.  4/23/2020 12:46 PM by Alberto Ovalle MD. Jessica Krishnamurthy was admitted from Home    Problem List    Patient Active Problem List    Diagnosis Date Noted    PFO (patent foramen ovale) 04/25/2020    Cervical spinal stenosis 04/25/2020    Alcohol abuse 04/24/2020    HTN (hypertension) 04/24/2020    Hypercholesteremia 04/24/2020    Ataxia 04/24/2020    Bilateral carotid artery stenosis 04/24/2020    Hx-TIA (transient ischemic attack) 04/24/2020    History of TIAs     Bilateral leg weakness     Vertigo 04/23/2020     Past Medical History:   Diagnosis Date    Alcohol abuse     Bilateral leg weakness     Developmental venous anomaly     History of TIAs          Core Measures:    CVA: No No  CHF:No No  PNA:No No    Post Op Surgical (If Applicable):     Number times ambulated in hallway past shift:  NONE  Number of times OOB to chair past shift:   TWICE  NG Tube: No  Incentive Spirometer: No  Drains: No   Volume  N/A  Dressing Present:  No  Flatus:  No    Activity Status:    OOB to Chair Yes  Ambulated this shift Yes   Bed Rest No    Supplemental O2: (If Applicable)    NC No  NRB No  Venti-mask No  On 0 Liters/min      LINES AND DRAINS:    Central Line? Yes Placement date N/A Reason Medically Necessary N/A    PICC LINE? Yes Placement date N/A Reason Medically Necessary N/A    Urinary Catheter? Yes Placement date N/A Reason Medically Necessary N/A      DVT prophylaxis:    DVT prophylaxis Med- Yes (SQ HEPARIN)  DVT prophylaxis SCD or CARMITA- No     Wounds: (If Applicable)    Wounds- No    Location NONE     Patient Safety:    Falls Score Total Score: 2  Safety Level_______  Bed Alarm On? Yes  Sitter?  No    Plan for upcoming shift:  Pending discharge for Monday 05/3/2020 once patient presents with negative COVID screen. Discharge Plan: Yes DISCHARGE TO [facility].     Active Consults:  IP CONSULT TO NEUROLOGY  IP CONSULT TO NEUROSURGERY  IP CONSULT TO CARDIOLOGY

## 2020-05-03 NOTE — ROUTINE PROCESS
Bedside shift [facility] [place] [facility] by Roopa Mcbride. Report included the following information Kardex. 
  
[facility] Phone:  [phone] 
  
  
Significant changes during shift: None. HS metoprolol held due to bradycardia 
  
  
  
Patient Information 
  
Josesito Brown 
47 y.o. 
4/23/2020 12:46 PM by Nayeli Winston MD. Josesito Brown was admitted from Home 
  
Problem List 
  
    
Patient Active Problem List  
  Diagnosis Date Noted  PFO (patent foramen ovale) 04/25/2020  Cervical spinal stenosis 04/25/2020  Alcohol abuse 04/24/2020  
 HTN (hypertension) 04/24/2020  Hypercholesteremia 04/24/2020  Ataxia 04/24/2020  Bilateral carotid artery stenosis 04/24/2020  
 Hx-TIA (transient ischemic attack) 04/24/2020  
 History of TIAs    
 Bilateral leg weakness    
 Vertigo 04/23/2020  
  
    
Past Medical History:  
Diagnosis Date  Alcohol abuse    
 Bilateral leg weakness    
 Developmental venous anomaly    
 History of TIAs    
  
  
  
Core Measures: 
  
CVA: No No 
CHF:No No 
PNA:No No 
  
Post Op Surgical (If Applicable):  
  
Number times ambulated in hallway past shift:  None Number of times OOB to chair past shift:   Twice NG Tube: No 
Incentive Spirometer: No 
Drains: No   Volume n/a 
Dressing Present:  No 
Flatus:  Refused 
  
Activity Status: 
  
OOB to Chair Yes Ambulated this shift Yes Bed Rest No 
  
Supplemental O2: (If Applicable) 
  
NC No 
NRB No 
Venti-mask No 
On 0 Liters/min 
  
  
LINES AND DRAINS: 
  
Central Line? Not applicable Placement date N/A Reason Medically Necessary N/A 
  
PICC LINE? Not applicable Placement date N/A Reason Medically Necessary N/A 
  
Urinary Catheter? Not applicable Placement Date N/A Reason Medically Necessary N/A   
  
DVT prophylaxis: 
  
DVT prophylaxis Med- Yes (SQ Heparin) DVT prophylaxis SCD or CARMITA- Not applicable  
  
Wounds: (If Applicable) 
  
Wounds- Not applicable 
  
Location N? A   
  
 Patient Safety: 
  
Falls Score Total Score: 2 Safety Level_______ Bed Alarm On? NO Sitter?  No 
  
Plan for upcoming shift:  
  
  
  
Discharge Plan: Yes -Discharge upon Negative COVID Screen result.  
  
Active Consults: 
IP CONSULT TO NEUROLOGY 
IP CONSULT TO NEUROSURGERY 
IP CONSULT TO CARDIOLOGY

## 2020-05-03 NOTE — PROGRESS NOTES
Hospitalist Progress Note    NAME: Tatiana Hall   :  1970   MRN:  [id number]     Interim Hospital Summary: 47 y.o. male whom presented on 2020 with      Assessment / Plan:    CVA  PFO  Ataxia or gait difficulty  -MRI brain:  IMPRESSION:   Focal restricted diffusion and T2 hyperintensity in the splenium of the corpus callosum. Mild generalized volume loss.  -Echo EF 55-60%, positive bubble study demonstrating right to left atrial shunt / PFO  -LE duplex no DVT  -Follow up with Dr Soto Begum for non urgent closure of possible PFO  -needs outpatient or home PT   -continue ASA  -DC planning. Discussed with CM, patient is homeless and was stating in a hotel prior to this admission. 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 He used to stay at a shelter in [facility]. [facility]. Pt does not have signs or symtpoms of COVID-19. No ISOLATION needed usually. Cervical myelopathy  -MRI C spine  1. C4-C5 severe central spinal canal stenosis, cord compression, and cord  myelomalacia more likely than cord contusion. 2. C6-C7 moderate central spinal canal stenosis. 3. Degenerative disease is superimposed on a congenitally slender cervical  spinal canal. Stenoses are detailed above.  -needs to follow up with NSGY for elective cervical decompression in 3 months    ETOH abuse  -continue thiamine and folate. No issues with withdrawal or DT  -he says that he used to drink heavier    HLD  -continue lipitor      18.5 - 24.9 Normal weight / Body mass index is 23.71 kg/m². Code status: Full  Prophylaxis: Hep SQ  Recommended Disposition:  PT, OT, RN     [facility]. Appreciate help from .  Pt would benefit [facility] at [facility] as its not a safe discharge at send to shelter due to recent CVA and need for Home PT/Outpatient PT. Todd Chopra at [facility] at his cell number [phone], they need COVID test before they accept him. They dont accept pt on weekend. So plan for DC on Monday. COVID-19 Negative     Subjective:     Chief Complaint / Reason for Physician Visit  Follow up of CVA, cervical myelopathy, ETOH, HTN  Chart reviewed in detail. Discussed with RN events overnight. No issues overnight. Review of Systems:  Symptom Y/N Comments  Symptom Y/N Comments   Fever/Chills n   Chest Pain n    Poor Appetite n   Edema n    Cough n   Abdominal Pain n    Sputum    Joint Pain     SOB/DIAZ    Pruritis/Rash     Nausea/vomit    Tolerating PT/OT     Diarrhea    Tolerating Diet     Constipation    Other       Could NOT obtain due to:      PO intake:   Patient Vitals for the past 72 hrs:   % Diet Eaten   05/02/20 1805 100 %   05/02/20 1310 100 %   05/02/20 0919 75 %     Objective:     VITALS:   Last 24hrs VS reviewed since prior progress note. Most recent are:  Patient Vitals for the past 24 hrs:   Temp Pulse Resp BP SpO2   05/03/20 1133 98.6 °F (37 °C) (!) 59 16 118/79 99 %   05/03/20 0748 99 °F (37.2 °C) 64 18 126/85 98 %   05/03/20 0358 97.9 °F (36.6 °C) (!) 55 18 119/60 98 %   05/02/20 2353 98 °F (36.7 °C) (!) 53 18 112/64 99 %   05/02/20 2140  (!) 55      05/02/20 2024  (!) 58  120/78    05/02/20 1854 98.2 °F (36.8 °C) 68 18 (!) 126/93 99 %   05/02/20 1533 98.2 °F (36.8 °C) 67 18 (!) 149/97 98 %       Intake/Output Summary (Last 24 hours) at 5/3/2020 1325  Last data filed at 5/2/2020 1805  Gross per 24 hour   Intake 240 ml   Output    Net 240 ml        PHYSICAL EXAM:  General: WD, WN. Alert, cooperative, no acute distress    EENT:  EOMI. Anicteric sclerae. MMM  Resp:  CTA bilaterally, no wheezing or rales.   No accessory muscle use  CV:  Regular  rhythm,  No edema  GI:  Soft, Non distended, Non tender.  +Bowel sounds  Neurologic:  Alert and oriented X 3, normal speech,   Psych:   Good insight. Not anxious nor agitated  Skin:  No rashes. No jaundice    Reviewed most current lab test results and cultures  YES  Reviewed most current radiology test results   YES  Review and summation of old records today    NO  Reviewed patient's current orders and MAR    YES  PMH/SH reviewed - no change compared to H&P  ________________________________________________________________________  Care Plan discussed with:    Comments   Patient x    Family      RN x    Care Manager     Consultant                        Multidiciplinary team rounds were held today with , nursing, pharmacist and clinical coordinator. Patient's plan of care was discussed; medications were reviewed and discharge planning was addressed. ________________________________________________________________________  Total NON critical care TIME:   10   Minutes    Total CRITICAL CARE TIME Spent:   Minutes non procedure based      Comments   >50% of visit spent in counseling and coordination of care x     This includes time during multidisciplinary rounds if indicated above   ________________________________________________________________________  Nunu Abdullahi MD     Procedures: see electronic medical records for all procedures/Xrays and details which were not copied into this note but were reviewed prior to creation of Plan. LABS:  I reviewed today's most current labs and imaging studies. Pertinent labs include:  No results for input(s): WBC, HGB, HCT, PLT, HGBEXT, HCTEXT, PLTEXT, HGBEXT, HCTEXT, PLTEXT in the last 72 hours. No results for input(s): NA, K, CL, CO2, GLU, BUN, CREA, CA, MG, PHOS, ALB, TBIL, TBILI, SGOT, ALT, INR, INREXT, INREXT in the last 72 hours.

## 2020-05-04 VITALS
SYSTOLIC BLOOD PRESSURE: 131 MMHG | DIASTOLIC BLOOD PRESSURE: 86 MMHG | HEIGHT: 71 IN | RESPIRATION RATE: 16 BRPM | WEIGHT: 170 LBS | HEART RATE: 65 BPM | BODY MASS INDEX: 23.8 KG/M2 | TEMPERATURE: 97.6 F | OXYGEN SATURATION: 100 %

## 2020-05-04 PROCEDURE — 74011250637 HC RX REV CODE- 250/637: Performed by: INTERNAL MEDICINE

## 2020-05-04 PROCEDURE — 94760 N-INVAS EAR/PLS OXIMETRY 1: CPT

## 2020-05-04 PROCEDURE — 74011250636 HC RX REV CODE- 250/636: Performed by: INTERNAL MEDICINE

## 2020-05-04 RX ORDER — ATORVASTATIN CALCIUM 40 MG/1
40 TABLET, FILM COATED ORAL DAILY
Qty: 30 TAB | Refills: 1 | Status: SHIPPED | OUTPATIENT
Start: 2020-05-04 | End: 2020-05-07 | Stop reason: SDUPTHER

## 2020-05-04 RX ORDER — GUAIFENESIN 100 MG/5ML
81 LIQUID (ML) ORAL DAILY
Qty: 30 TAB | Refills: 2 | Status: SHIPPED | OUTPATIENT
Start: 2020-05-04 | End: 2020-05-07 | Stop reason: SDUPTHER

## 2020-05-04 RX ORDER — FOLIC ACID 1 MG/1
1 TABLET ORAL DAILY
Qty: 30 TAB | Refills: 1 | Status: SHIPPED | OUTPATIENT
Start: 2020-05-04 | End: 2020-05-07 | Stop reason: SDUPTHER

## 2020-05-04 RX ORDER — ASPIRIN 325 MG/1
100 TABLET, FILM COATED ORAL DAILY
Qty: 30 TAB | Refills: 1 | Status: SHIPPED | OUTPATIENT
Start: 2020-05-04 | End: 2020-05-07 | Stop reason: SDUPTHER

## 2020-05-04 RX ORDER — METOPROLOL TARTRATE 25 MG/1
12.5 TABLET, FILM COATED ORAL 2 TIMES DAILY
Qty: 60 TAB | Refills: 1 | Status: SHIPPED | OUTPATIENT
Start: 2020-05-04 | End: 2020-05-07 | Stop reason: SDUPTHER

## 2020-05-04 RX ADMIN — THIAMINE HCL TAB 100 MG 100 MG: 100 TAB at 10:02

## 2020-05-04 RX ADMIN — ASPIRIN 81 MG 81 MG: 81 TABLET ORAL at 10:03

## 2020-05-04 RX ADMIN — HEPARIN SODIUM 5000 UNITS: 5000 INJECTION INTRAVENOUS; SUBCUTANEOUS at 02:54

## 2020-05-04 RX ADMIN — METOPROLOL TARTRATE 12.5 MG: 25 TABLET, FILM COATED ORAL at 10:02

## 2020-05-04 RX ADMIN — HEPARIN SODIUM 5000 UNITS: 5000 INJECTION INTRAVENOUS; SUBCUTANEOUS at 10:03

## 2020-05-04 RX ADMIN — FOLIC ACID 1 MG: 1 TABLET ORAL at 10:02

## 2020-05-04 RX ADMIN — THERA TABS 1 TABLET: TAB at 10:02

## 2020-05-04 NOTE — PROGRESS NOTES
Discharge instructions and medications were reviewed with patient. Pt verbalized understanding, rolling walker was delivered to room and taken with pt to [facility]. All questions were answered. Pt has been discharged.

## 2020-05-04 NOTE — DISCHARGE SUMMARY
Hospitalist Discharge Summary     Patient ID:  Bianca Loco  [id number]  47 y.o.  1970    PCP on record: None    Admit date: 4/23/2020  Discharge date and time: 5/4/2020      DISCHARGE DIAGNOSIS:    CVA with ataxia  PFO  Cervical myelopathy due to severe spinal stenosis  ETOH abuse  HLD    CONSULTATIONS:  IP CONSULT TO NEUROLOGY  IP CONSULT TO NEUROSURGERY  IP CONSULT TO CARDIOLOGY    Excerpted HPI from H&P of Satya Allen MD:  CHIEF COMPLAINT: I felt like I was staggering today when I walked     HISTORY OF PRESENT ILLNESS:     70-year-old -American male     Originally from [place], currently is homeless     Says he had a stroke in [facility] in January 2020  Presented with similar symptoms to today  Says he is not currently taking an aspirin or any prescription medications     Awoke this morning suddenly felt unsteady when he walked \"like I was staggering\"  Initially denied drinking but had an alcohol level of 208 in the emergency room  No headache  No speech changes or visual changes  No focal weakness but did admit to bilateral leg weakness  No sensory changes  No neck or back pain  No fevers chills, cough, shortness of breath, chest pain, nausea vomiting, diarrhea, abdominal pain     Called EMS because of the unsteady gait     Emergency room neuro exam nonfocal  Hemodynamically stable  Head CT scan negative  CTA head and neck with no large vessel occlusion and no significant vascular disease              Right frontal developmental venous anomaly  Tele neurology consulted, recommended admission for MRI and further evaluation    ______________________________________________________________________  DISCHARGE SUMMARY/HOSPITAL COURSE:  for full details see H&P, daily progress notes, labs, consult notes. CVA with ataxia  PFO  -MRI brain:  IMPRESSION:   Focal restricted diffusion and T2 hyperintensity in the splenium of the corpus callosum.  Mild generalized volume loss.  -Echo EF 55-60%, positive bubble study demonstrating right to left atrial shunt / PFO  -LE duplex no DVT  -Follow up with Dr Ti Lynn for non urgent closure of possible PFO  -needs outpatient or home PT   -continue ASA  -DC planning. Discussed with CM, patient is homeless and was stating in a hotel prior to this admission. 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 He used to stay at a shelter in [facility]. [facility]   COVID negative 5/1     Cervical myelopathy due to severe spinal stenosis  -MRI C spine  1. C4-C5 severe central spinal canal stenosis, cord compression, and cord  myelomalacia more likely than cord contusion. 2. C6-C7 moderate central spinal canal stenosis. 3. Degenerative disease is superimposed on a congenitally slender cervical  spinal canal. Stenoses are detailed above.  -needs to follow up with NSGY for elective cervical decompression in 3 months     ETOH abuse  -continue thiamine and folate  -he says that he used to drink heavier     HLD  -continue lipitor      _______________________________________________________________________  Patient seen and examined by me on discharge day. Pertinent Findings:  Gen:    Not in distress  Chest: Clear lungs  CVS:   Regular rhythm. No edema  Abd:  Soft, not distended, not tender  Neuro:  Alert, Oriented x 4, grossly non focal exam  _______________________________________________________________________  DISCHARGE MEDICATIONS:   Current Discharge Medication List      START taking these medications    Details   atorvastatin (Lipitor) 40 mg tablet Take 1 Tab by mouth daily. Qty: 30 Tab, Refills: 1      metoprolol tartrate (LOPRESSOR) 25 mg tablet Take 0.5 Tabs by mouth two (2) times a day. Qty: 60 Tab, Refills: 1      folic acid (FOLVITE) 1 mg tablet Take 1 Tab by mouth daily for 60 days.   Qty: 30 Tab, Refills: 1      aspirin 81 mg chewable tablet Take 1 Tab by mouth daily for 90 days. Qty: 30 Tab, Refills: 2      thiamine mononitrate (B-1) 100 mg tablet Take 1 Tab by mouth daily for 60 days. Qty: 30 Tab, Refills: 1             My Recommended Diet, Activity, Wound Care, and follow-up labs are listed in the patient's Discharge Insturctions which I have personally completed and reviewed.   Risk of deterioration: Low    Condition at Discharge:  Stable  _____________________________________________________________________    Disposition  Home with family and home health services  ____________________________________________________________________    Care Plan discussed with:   Patient, Family, RN, Care Manager, Consultant    ____________________________________________________________________    Code Status: Full Code  ____________________________________________________________________      Condition at Discharge:  Stable  _____________________________________________________________________  Follow up with:   PCP : None  Follow-up Information     Follow up With Specialties Details Why Song Merchant MD Cardiology, Vascular Surgery, General and Vascular Surgery Schedule an appointment as soon as possible for a visit in 2 weeks for virtual visit [address]  [address] [facility] Ashley Alexander MD Neurosurgery In 3 months to discuss possible neck surgery  [place] [address] [address] [facility]      Rodrigo Pat MD Neurology Go on 6/9/2020 at 8:40 for a Virtual Visit [facility] [phone]      None    None ([phone]) Patient stated that they have no PCP                Total time in minutes spent coordinating this discharge (includes going over instructions, follow-up, prescriptions, and preparing report for sign off to her PCP) :  35 minutes    Signed:  Tima Shah MD

## 2020-05-04 NOTE — PROGRESS NOTES
YVONNE Plan:    * [facility]     > CM secured transportation through pt's Medicaid provider; provider to call nurse's station with pick-up time  > CM secured [facility]; nurse delivered to pt  > CM faxed COVID-19 test results & d/c summary to the [facility] per request (f: [phone])  > CM secured new-pt PCP f/u appt for d/c; details reflected in AVS for reference   > [facility] provided (DME) RW; RW delivered to pt's room    1:22 PM: CM delivered pt's Rx's to bedside nurse to provide to pt prior to d/c. CM contacted [address] [place] [address] Ave ([phone]) to secure transport for d/c. CM confirmed transport with trip reference number [id number]. Provider will contact nurse's station with pick-up time. No further CM needs identified at this time; CM will remain available for consult if additional needs arise before pt leaves facility. 12:03 PM: CM received call from [place] with the [facility] confirming he received fax of pt's COVID-19 test results and d/c summary. CM received call from [facility] with the [place] [id number] reporting pt's Rx's are ready for pick-up; CM will pick-up pt's Rx's and deliver to bedside nurse. CM is actively working to secure medical transport for d/c; CM will report updates as they become available. 11:51 AM: CM contacted the [place] [address] ([phone]) to f/u on status of pt's Rx's for d/c; CM confirmed [facility] pharmacy received pt's Rx's. CM requested for someone to contact CM directly once Rx's are ready for pick-up; request to be completed. CM faxed pt's d/c summary to the [facility] for reference. 11:26 AM: CM contacted pt via bedside phone due to restrictions in place from COVID-19. CM informed pt of d/c and reviewed YVONNE plan; pt confirmed he is agreeable to plan. Pt informed that CM is actively working to secure 30-day supply of Rx's for d/c; pt verbalized understanding.  Pt informed that SIMON will coordinate medical transport for d/c; pt verbalized understanding. Initial note: CM reviewed pt's chart prior to moving forward with d/c planning. Per previous CM's note, the [facility] was not able to accept pt until results of COVID-19 test came back; CM noted pt's COVID-19 test results came back negative. CM contacted Artemio Hung, Clinical Care director for medical respite at the [facility] ([phone], ext: [phone]) to relay update and discuss d/c. Radha Diamond requested for pt's COVID-23 results to be faxed to the [facility] for reference; CM completed request. Radha Diamond confirmed the [facility] is able to accept pt for d/c today. CM is actively working to secure 30-day [facility]; CM will report updates as they become available. CM will coordinate medical transport through pt's Medicaid provider once d/c is confirmed with pt's attending MD.    Care Management Interventions  PCP Verified by CM: No  Palliative Care Criteria Met (RRAT>21 & CHF Dx)?: No  Mode of Transport at Discharge: Other (see comment)(Medicaid transport secured; pick-up time frame within the next 3 hours)  Transition of Care Consult (CM Consult): Discharge Planning  MyChart Signup: No  Discharge Durable Medical Equipment: Yes(RW delivered by [facility])  Physical Therapy Consult: Yes  Occupational Therapy Consult: Yes  Speech Therapy Consult: Yes  Current Support Network:  Other(Pt was homeless prior to admission; pt d/c to medical respite @ [facility])  Confirm Follow Up Transport: Other (see comment)(Medicaid transport)  The Plan for Transition of Care is Related to the Following Treatment Goals : Medical Respite at [facility]  The Patient and/or Patient Representative was Provided with a Choice of Provider and Agrees with the Discharge Plan?: Yes  [place] Resource Information Provided?: No  Discharge Location  Discharge Placement: Shelter(Medical respite at [facility])    Davis Hatch, [address] [facility], [address] [facility]

## 2020-05-04 NOTE — ROUTINE PROCESS
The following appointments have been successfully scheduled: 
 
Date/time Thursday, May 07, 2020 09:00 AM 
Patient  Samuel Jordan JADA 1970 ([id number] M) #[id number] C#[id number] Department [facility] SYSTEM OFFICE TONNY 203 Appointment type Virtual Visit Special Case 30 Provider Mary Carmen Bethea Appointment type notes Virtual Visit Special Case 30 
for visits related to COVID

## 2020-05-07 ENCOUNTER — VIRTUAL VISIT (OUTPATIENT)
Dept: FAMILY MEDICINE CLINIC | Age: 50
End: 2020-05-07

## 2020-05-07 ENCOUNTER — TELEPHONE (OUTPATIENT)
Dept: FAMILY MEDICINE CLINIC | Age: 50
End: 2020-05-07

## 2020-05-07 VITALS — SYSTOLIC BLOOD PRESSURE: 114 MMHG | DIASTOLIC BLOOD PRESSURE: 76 MMHG | HEART RATE: 60 BPM

## 2020-05-07 DIAGNOSIS — G99.2 MYELOPATHY CONCURRENT WITH AND DUE TO SPINAL STENOSIS OF CERVICAL REGION (HCC): ICD-10-CM

## 2020-05-07 DIAGNOSIS — Q21.12 PFO (PATENT FORAMEN OVALE): ICD-10-CM

## 2020-05-07 DIAGNOSIS — Z76.89 ESTABLISHING CARE WITH NEW DOCTOR, ENCOUNTER FOR: Primary | ICD-10-CM

## 2020-05-07 DIAGNOSIS — M48.02 MYELOPATHY CONCURRENT WITH AND DUE TO SPINAL STENOSIS OF CERVICAL REGION (HCC): ICD-10-CM

## 2020-05-07 DIAGNOSIS — F10.10 ALCOHOL ABUSE: ICD-10-CM

## 2020-05-07 DIAGNOSIS — Z13.29 SCREENING FOR THYROID DISORDER: ICD-10-CM

## 2020-05-07 DIAGNOSIS — I10 HYPERTENSION GOAL BP (BLOOD PRESSURE) < 130/80: ICD-10-CM

## 2020-05-07 DIAGNOSIS — I69.993 CVA, OLD, ATAXIA: ICD-10-CM

## 2020-05-07 DIAGNOSIS — R35.0 FREQUENCY OF URINATION: ICD-10-CM

## 2020-05-07 DIAGNOSIS — Z12.5 SCREENING FOR PROSTATE CANCER: ICD-10-CM

## 2020-05-07 DIAGNOSIS — E55.9 VITAMIN D DEFICIENCY: ICD-10-CM

## 2020-05-07 DIAGNOSIS — E78.00 HYPERCHOLESTEROLEMIA: ICD-10-CM

## 2020-05-07 RX ORDER — GUAIFENESIN 100 MG/5ML
81 LIQUID (ML) ORAL DAILY
Qty: 30 TAB | Refills: 2 | Status: SHIPPED | OUTPATIENT
Start: 2020-05-07 | End: 2020-08-05

## 2020-05-07 RX ORDER — METOPROLOL TARTRATE 25 MG/1
12.5 TABLET, FILM COATED ORAL 2 TIMES DAILY
Qty: 60 TAB | Refills: 1 | Status: SHIPPED | OUTPATIENT
Start: 2020-05-07 | End: 2020-12-10 | Stop reason: SDUPTHER

## 2020-05-07 RX ORDER — ATORVASTATIN CALCIUM 40 MG/1
40 TABLET, FILM COATED ORAL DAILY
Qty: 30 TAB | Refills: 1 | Status: SHIPPED | OUTPATIENT
Start: 2020-05-07 | End: 2021-03-08 | Stop reason: CLARIF

## 2020-05-07 RX ORDER — CHOLECALCIFEROL (VITAMIN D3) 125 MCG
CAPSULE ORAL
Qty: 60 CAP | Refills: 4 | Status: SHIPPED | OUTPATIENT
Start: 2020-05-07 | End: 2020-08-18 | Stop reason: ALTCHOICE

## 2020-05-07 RX ORDER — FOLIC ACID 1 MG/1
1 TABLET ORAL DAILY
Qty: 30 TAB | Refills: 1 | Status: SHIPPED | OUTPATIENT
Start: 2020-05-07 | End: 2020-07-06

## 2020-05-07 RX ORDER — ASPIRIN 325 MG/1
100 TABLET, FILM COATED ORAL DAILY
Qty: 30 TAB | Refills: 1 | Status: SHIPPED | OUTPATIENT
Start: 2020-05-07 | End: 2020-07-06

## 2020-05-07 NOTE — PROGRESS NOTES
Chief Complaint   Patient presents with    New Patient     Patient was seen in ED [facility] on 4/23/2020

## 2020-05-07 NOTE — TELEPHONE ENCOUNTER
----- Message from Darren Norton sent at 5/7/2020 10:42 AM EDT -----  Regarding: Dr. Apple Jaffe  General Message/Vendor Calls    Caller's first and last name: PT       Reason for call:  Pt's  114/76 BP Pulse 60 readings were taken today 05/07/20 at 10:30AM      Callback required yes/no and why:  Yes, if needed       Best contact number(s):  [phone]      Details to clarify the request:  Pt's appt was today 05/07/20 at 9:00AM.  Pt stated, he has additional readings, if needed to be dropped off.       Darren Norton

## 2020-05-07 NOTE — PROGRESS NOTES
Chief Complaint   Patient presents with    New Patient     HPI:  Josesito Brown is a 45 y.o. male who was seen by synchronous (real-time) audio-video technology on 5/7/2020. Consent: Josesito Brown, who was seen by synchronous (real-time) audio-video technology, and/or his healthcare decision maker, is aware that this patient-initiated, Telehealth encounter on 5/7/2020 is a billable service, with coverage as determined by his insurance carrier. He is aware that he may receive a bill and has provided verbal consent to proceed: Yes. Assessment & Plan:   Diagnoses and all orders for this visit:    1. Establishing care with new doctor, encounter for  -     METABOLIC PANEL, COMPREHENSIVE  -     CBC WITH AUTOMATED DIFF    2. Myelopathy concurrent with and due to spinal stenosis of cervical region [facility])  -     aspirin 81 mg chewable tablet; Take 1 Tab by mouth daily for 90 days.  -     REFERRAL TO NEUROSURGERY    3. CVA, old, ataxia  -     aspirin 81 mg chewable tablet; Take 1 Tab by mouth daily for 90 days.  -     REFERRAL TO NEUROLOGY    4. Hypertension goal BP (blood pressure) < 718/23  -     METABOLIC PANEL, COMPREHENSIVE  -     CBC WITH AUTOMATED DIFF  -     metoprolol tartrate (LOPRESSOR) 25 mg tablet; Take 0.5 Tabs by mouth two (2) times a day. -     aspirin 81 mg chewable tablet; Take 1 Tab by mouth daily for 90 days.  -     REFERRAL TO CARDIOLOGY    5. Hypercholesterolemia  -     atorvastatin (Lipitor) 40 mg tablet; Take 1 Tab by mouth daily. 6. Screening for prostate cancer  -     PSA, DIAGNOSTIC (PROSTATE SPECIFIC AG)    7. Screening for thyroid disorder  -     TSH 3RD GENERATION    8. Vitamin D deficiency  -     cholecalciferol (VITAMIN D3) (5000 Units /125 mcg) capsule; Take 2 caps by mouth daily for 3 months    9. Frequency of urination  -     URINALYSIS W/ RFLX MICROSCOPIC    10. Alcohol abuse  -     folic acid (FOLVITE) 1 mg tablet; Take 1 Tab by mouth daily for 60 days.   -     thiamine mononitrate (B-1) 100 mg tablet; Take 1 Tab by mouth daily for 60 days. 11. PFO (patent foramen ovale)  -     REFERRAL TO CARDIOLOGY          I spent at least 45 minutes on this visit with this new patient. [phone]  Subjective:   Carlos Castaneda is a 45 y.o. AA male with h/o CVA with ataxia, hypertension, hypercholesterolemia, ETOH abuse who was seen as a new patient following discharge. Echo EF 55-60%, positive bubble study demonstrating right to left atrial shunt / PFO  Patient currently lives at [address] [place] [address]. He is doing well. He has no new complaints. Prior to Admission medications    Medication Sig Start Date End Date Taking? Authorizing Provider   atorvastatin (Lipitor) 40 mg tablet Take 1 Tab by mouth daily. 5/4/20  Yes Pauly Benítez MD   metoprolol tartrate (LOPRESSOR) 25 mg tablet Take 0.5 Tabs by mouth two (2) times a day. 5/4/20  Yes Pauly Benítez MD   folic acid (FOLVITE) 1 mg tablet Take 1 Tab by mouth daily for 60 days. 5/4/20 7/3/20 Yes Pauly Benítez MD   aspirin 81 mg chewable tablet Take 1 Tab by mouth daily for 90 days. 5/4/20 8/2/20 Yes Pauly Benítez MD   thiamine mononitrate (B-1) 100 mg tablet Take 1 Tab by mouth daily for 60 days. 5/4/20 7/3/20 Yes Pauly Benítez MD     No Known Allergies    Patient Active Problem List   Diagnosis Code    Vertigo R42    Alcohol abuse F10.10    HTN (hypertension) I10    Hypercholesteremia E78.00    Ataxia R27.0    History of TIAs Z86.73    Bilateral leg weakness R29.898    Bilateral carotid artery stenosis I65.23    Hx-TIA (transient ischemic attack) Z86.73    PFO (patent foramen ovale) Q21.1    Cervical spinal stenosis M48.02     Current Outpatient Medications   Medication Sig Dispense Refill    atorvastatin (Lipitor) 40 mg tablet Take 1 Tab by mouth daily. 30 Tab 1    metoprolol tartrate (LOPRESSOR) 25 mg tablet Take 0.5 Tabs by mouth two (2) times a day.  60 Tab 1    folic acid (FOLVITE) 1 mg tablet Take 1 Tab by mouth daily for 60 days. 30 Tab 1    thiamine mononitrate (B-1) 100 mg tablet Take 1 Tab by mouth daily for 60 days. 30 Tab 1    aspirin 81 mg chewable tablet Take 1 Tab by mouth daily for 90 days. 30 Tab 2    cholecalciferol (VITAMIN D3) (5000 Units /125 mcg) capsule Take 2 caps by mouth daily for 3 months 60 Cap 4     No Known Allergies  Past Medical History:   Diagnosis Date    Alcohol abuse     Bilateral leg weakness     Developmental venous anomaly     History of TIAs      No past surgical history on file. Family History   Problem Relation Age of Onset    Lung Cancer Mother     COPD Mother     Lung Cancer Father     COPD Father      Social History     Tobacco Use    Smoking status: Never Smoker    Smokeless tobacco: Never Used   Substance Use Topics    Alcohol use: Yes     Comment: social drinker       ROS      Objective: There were no vitals taken for this visit. General: alert, cooperative, no distress   Mental  status: normal mood, behavior, speech, dress, motor activity, and thought processes, able to follow commands   HENT: NCAT   Neck: no visualized mass   Resp: no respiratory distress   Neuro: no gross deficits   Skin: no discoloration or lesions of concern on visible areas   Psychiatric: normal affect, consistent with stated mood, no evidence of hallucinations     Additional exam findings: We discussed the expected course, resolution and complications of the diagnosis(es) in detail. Medication risks, benefits, costs, interactions, and alternatives were discussed as indicated. I advised him to contact the office if his condition worsens, changes or fails to improve as anticipated. He expressed understanding with the diagnosis(es) and plan. Lillie Snell is a 45 y.o. male who was evaluated by a video visit encounter for concerns as above. Patient identification was verified prior to start of the visit. A caregiver was present when appropriate.  Due to this being a TeleHealth encounter ([facility] health emergency), evaluation of the following organ systems was limited: Vitals/Constitutional/EENT/Resp/CV/GI//MS/Neuro/Skin/Heme-Lymph-Imm. Pursuant to the emergency declaration under the [address] [place] [address], On license of [facility] waiver authority and the [employer] Act, this Virtual  Visit was conducted, with patient's (and/or legal guardian's) consent, to reduce the patient's risk of exposure to COVID-19 and provide necessary medical care. Services were provided through a video synchronous discussion virtually to substitute for in-person clinic visit. Patient and provider were located at their individual homes.       Aicha Mejias MD

## 2020-05-11 ENCOUNTER — VIRTUAL VISIT (OUTPATIENT)
Dept: CARDIOLOGY CLINIC | Age: 50
End: 2020-05-11

## 2020-05-11 VITALS — BODY MASS INDEX: 23.71 KG/M2 | HEIGHT: 71 IN

## 2020-05-11 DIAGNOSIS — Q21.12 PFO (PATENT FORAMEN OVALE): Primary | ICD-10-CM

## 2020-05-11 DIAGNOSIS — Z86.73 HISTORY OF TIAS: ICD-10-CM

## 2020-05-11 DIAGNOSIS — E78.00 HYPERCHOLESTEREMIA: ICD-10-CM

## 2020-05-11 DIAGNOSIS — I10 ESSENTIAL HYPERTENSION: ICD-10-CM

## 2020-05-11 NOTE — PROGRESS NOTES
Arley Barnett is a 45 y.o. male who as seen by synchronous (real-time) audio-video technology on 5/11/2020 5/11/2020 9:35 AM      Subjective:     Arley Barnett is referred to me for evaluation of possible PFO closure. Referred by Dr. Misael Spaulding. Recently admitted at [facility] with TIA/CVA. Seen by neurology. Now feels great. H/o previous CVA per pt at [facility]. He denies chest pain, chest pressure/discomfort, dyspnea, palpitations, irregular heart beats, near-syncope, syncope, fatigue, orthopnea, paroxysmal nocturnal dyspnea, exertional chest pressure/discomfort, claudication, lower extremity edema, tachypnea. Visit Vitals  Ht 5' 11\" (1.803 m)   BMI 23.71 kg/m²     Current Outpatient Medications   Medication Sig    atorvastatin (Lipitor) 40 mg tablet Take 1 Tab by mouth daily.  metoprolol tartrate (LOPRESSOR) 25 mg tablet Take 0.5 Tabs by mouth two (2) times a day.  folic acid (FOLVITE) 1 mg tablet Take 1 Tab by mouth daily for 60 days.  thiamine mononitrate (B-1) 100 mg tablet Take 1 Tab by mouth daily for 60 days.  aspirin 81 mg chewable tablet Take 1 Tab by mouth daily for 90 days.  cholecalciferol (VITAMIN D3) (5000 Units /125 mcg) capsule Take 2 caps by mouth daily for 3 months     No current facility-administered medications for this visit. Objective:      Visit Vitals   5' 11\" (1.803 m)   BMI 23.71 kg/m²       Data Review:   Labs:  No results found for this or any previous visit (from the past 24 hour(s)). Reviewed and/or ordered active problem list, medication list tests    Past Medical History:   Diagnosis Date    Alcohol abuse     Bilateral leg weakness     Developmental venous anomaly     History of TIAs       No past surgical history on file.   No Known Allergies   Family History   Problem Relation Age of Onset    Lung Cancer Mother     COPD Mother     Lung Cancer Father     COPD Father       Social History     Socioeconomic History    Marital status: SINGLE     Spouse name: Not on file    Number of children: Not on file    Years of education: Not on file    Highest education level: Not on file   Occupational History    Not on file   Social Needs    Financial resource strain: Not on file    Food insecurity     Worry: Not on file     Inability: Not on file    Transportation needs     Medical: Not on file     Non-medical: Not on file   Tobacco Use    Smoking status: Never Smoker    Smokeless tobacco: Never Used   Substance and Sexual Activity    Alcohol use: Yes     Comment: social drinker    Drug use: No    Sexual activity: Not on file   Lifestyle    Physical activity     Days per week: Not on file     Minutes per session: Not on file    Stress: Not on file   Relationships    Social connections     Talks on phone: Not on file     Gets together: Not on file     Attends Jew service: Not on file     Active member of club or organization: Not on file     Attends meetings of clubs or organizations: Not on file     Relationship status: Not on file    Intimate partner violence     Fear of current or ex partner: Not on file     Emotionally abused: Not on file     Physically abused: Not on file     Forced sexual activity: Not on file   Other Topics Concern    Not on file   Social History Narrative    Not on file         Review of Systems     General: Not Present- Anorexia, Chills, Dietary Changes, Fatigue, Fever, Medication Changes, Night Sweats, Weight Gain > 10lbs. and Weight Loss > 10lbs. .  Skin: Not Present- Bruising and Excessive Sweating. HEENT: Not Present- Headache, Visual Loss and Vertigo. Respiratory: Not Present- Cough, Decreased Exercise Tolerance, Difficulty Breathing, Snoring and Wheezing.   Cardiovascular: Not Present- Abnormal Blood Pressure, Chest Pain, Claudications, Difficulty Breathing On Exertion, Edema, Fainting / Blacking Out, Irregular Heart Beat, Night Cramps, Orthopnea, Palpitations, Paroxysmal Nocturnal Dyspnea, Rapid Heart Rate, Shortness of Breath and Swelling of Extremities. Gastrointestinal: Not Present- Black, Tarry Stool, Bloody Stool, Diarrhea, Hematemesis, Rectal Bleeding and Vomiting. Musculoskeletal: Not Present- Muscle Pain and Muscle Weakness. Neurological: Not Present- Dizziness. Psychiatric: Not Present- Depression. Endocrine: Not Present- Cold Intolerance, Heat Intolerance and Thyroid Problems. Hematology: Not Present- Abnormal Bleeding, Anemia, Blood Clots and Easy Bruising. Physical Exam   The physical exam findings are as follows:       General   Mental Status - Alert. General Appearance - Not in acute distress. Chest and Lung Exam   Inspection: Accessory muscles - No use of accessory muscles in breathing. No audible wheezing. Peripheral Vascular   Upper Extremity: Inspection - Bilateral - No Cyanotic nailbeds or Digital clubbing. Lower Extremity:   Palpation: Edema - Bilateral - No edema. Assessment:       ICD-10-CM ICD-9-CM    1. PFO (patent foramen ovale) Q21.1 745.5    2. History of TIAs Z86.73 V12.54    3. Essential hypertension I10 401.9    4. Hypercholesteremia E78.00 272.0        Plan:     1. PFO, CVA/TIA:   RoPE score is 4. 38% chance that stroke is due to PFO and 12% risk of 2 year recurrent of CVA/TIA. Discussed with patient in details. Records reviewed. Risk of recurrent stroke discussed. Will refer him to Dr. Cam Corley (neurology) and will discuss with him afterwards. Needs BRADY. Due to concern of COVID 19, will have him f/u with neurology first and if decision is made to proceed with PFO closure, then will get BRADY. Advised patient to continue current meds. 2. On statin. 3. BP controlled. Monitor at home.         Pursuant to the emergency declaration under the [facility], 1135 waiver authority and the [employer] Act, this Virtual Visit was conducted, with patient's consent, to reduce the patient's risk of exposure to COVID-19 and provide continuity of care for an established patient. Services were provided through a video synchronous discussion virtually to substitute for in-person clinic visit.

## 2020-05-27 ENCOUNTER — DOCUMENTATION ONLY (OUTPATIENT)
Dept: CARDIOLOGY CLINIC | Age: 50
End: 2020-05-27

## 2020-05-27 NOTE — PROGRESS NOTES
Faxed request to [employer] at [phone] for virtual visit records. Faxed the virtual visit office note from 5/11/20. Received fax confirmation.

## 2020-06-23 ENCOUNTER — OFFICE VISIT (OUTPATIENT)
Dept: NEUROLOGY | Age: 50
End: 2020-06-23

## 2020-06-23 VITALS
HEART RATE: 60 BPM | SYSTOLIC BLOOD PRESSURE: 128 MMHG | BODY MASS INDEX: 28 KG/M2 | DIASTOLIC BLOOD PRESSURE: 82 MMHG | OXYGEN SATURATION: 99 % | WEIGHT: 200 LBS | HEIGHT: 71 IN

## 2020-06-23 DIAGNOSIS — M50.00 DEGENERATION OF CERVICAL INTERVERTEBRAL DISC WITH MYELOPATHY: ICD-10-CM

## 2020-06-23 DIAGNOSIS — M48.02 CERVICAL SPINAL STENOSIS: ICD-10-CM

## 2020-06-23 DIAGNOSIS — Q21.12 PFO (PATENT FORAMEN OVALE): ICD-10-CM

## 2020-06-23 DIAGNOSIS — I65.23 BILATERAL CAROTID ARTERY STENOSIS: Primary | ICD-10-CM

## 2020-06-23 DIAGNOSIS — R29.898 BILATERAL LEG WEAKNESS: ICD-10-CM

## 2020-06-23 DIAGNOSIS — R27.0 ATAXIA: ICD-10-CM

## 2020-06-23 DIAGNOSIS — F10.10 ALCOHOL ABUSE: ICD-10-CM

## 2020-06-23 NOTE — PATIENT INSTRUCTIONS
A Healthy Lifestyle: Care Instructions Your Care Instructions A healthy lifestyle can help you feel good, stay at a healthy weight, and have plenty of energy for both work and play. A healthy lifestyle is something you can share with your whole family. A healthy lifestyle also can lower your risk for serious health problems, such as high blood pressure, heart disease, and diabetes. You can follow a few steps listed below to improve your health and the health of your family. Follow-up care is a key part of your treatment and safety. Be sure to make and go to all appointments, and call your doctor if you are having problems. It's also a good idea to know your test results and keep a list of the medicines you take. How can you care for yourself at home? · Do not eat too much sugar, fat, or fast foods. You can still have dessert and treats now and then. The goal is moderation. · Start small to improve your eating habits. Pay attention to portion sizes, drink less juice and soda pop, and eat more fruits and vegetables. ? Eat a healthy amount of food. A 3-ounce serving of meat, for example, is about the size of a deck of cards. Fill the rest of your plate with vegetables and whole grains. ? Limit the amount of soda and sports drinks you have every day. Drink more water when you are thirsty. ? Eat at least 5 servings of fruits and vegetables every day. It may seem like a lot, but it is not hard to reach this goal. A serving or helping is 1 piece of fruit, 1 cup of vegetables, or 2 cups of leafy, raw vegetables. Have an apple or some carrot sticks as an afternoon snack instead of a candy bar. Try to have fruits and/or vegetables at every meal. 
· Make exercise part of your daily routine. You may want to start with simple activities, such as walking, bicycling, or slow swimming. Try to be active 30 to 60 minutes every day.  You do not need to do all 30 to 60 minutes all at once. For example, you can exercise 3 times a day for 10 or 20 minutes. Moderate exercise is safe for most people, but it is always a good idea to talk to your doctor before starting an exercise program. 
· Keep moving. Steve Ayersred the lawn, work in the garden, or [place]. Take the stairs instead of the elevator at work. · If you smoke, quit. People who smoke have an increased risk for heart attack, stroke, cancer, and other lung illnesses. Quitting is hard, but there are ways to boost your chance of quitting tobacco for good. ? Use nicotine gum, patches, or lozenges. ? Ask your doctor about stop-smoking programs and medicines. ? Keep trying. In addition to reducing your risk of diseases in the future, you will notice some benefits soon after you stop using tobacco. If you have shortness of breath or asthma symptoms, they will likely get better within a few weeks after you quit. · Limit how much alcohol you drink. Moderate amounts of alcohol (up to 2 drinks a day for men, 1 drink a day for women) are okay. But drinking too much can lead to liver problems, high blood pressure, and other health problems. Family health If you have a family, there are many things you can do together to improve your health. · Eat meals together as a family as often as possible. · Eat healthy foods. This includes fruits, vegetables, lean meats and dairy, and whole grains. · Include your family in your fitness plan. Most people think of activities such as jogging or tennis as the way to fitness, but there are many ways you and your family can be more active. Anything that makes you breathe hard and gets your heart pumping is exercise. Here are some tips: 
? Walk to do errands or to take your child to school or the bus. 
? Go for a family bike ride after dinner instead of watching TV. Where can you learn more? Go to http://iesha-mason.info/ Enter G309 in the search box to learn more about \"A Healthy Lifestyle: Care Instructions. \" Current as of: January 31, 2020               Content Version: 12.5 © 5426-3111 [employer]orporated. Care instructions adapted under license by [employer] (which disclaims liability or warranty for this information). If you have questions about a medical condition or this instruction, always ask your healthcare professional. [address] any warranty or liability for your use of this information. Office Policies 
 
o Phone calls/patient messages: Please allow up to 24 hours for someone in the office to contact you about your call or message. Be mindful your provider may be out of the office or your message may require further review. We encourage you to use [employer] for your messages as this is a faster, more efficient way to communicate with our office 
 
o Medication Refills: 
Prescription medications require up to 48 business hours to process. We encourage you to use [employer] for your refills. For controlled medications: Please allow up to 72 business hours to process. Certain medications may require you to  a written prescription at our office. NO narcotic/controlled medications will be prescribed after 4pm Monday through Friday or on weekends 
 
o Form/Paperwork Completion: We ask that you allow 7-14 business days. You may also download your forms to [employer] to have your doctor print off.

## 2020-06-23 NOTE — LETTER
6/23/2020 12:47 PM 
 
Patient:  Eunice Hernandez YOB: 1970 Date of Visit: 6/23/2020 Dear No Recipients: Thank you for referring Mr. Adeline Odonnell to me for evaluation/treatment. Below are the relevant portions of my assessment and plan of care. Consult REFERRED BY: 
Kavin Emanuel MD 
 
CHIEF COMPLAINT: Bilateral leg weakness Subjective:  
 
Eunice Hernandez is a 45 y.o. right-handed male seen for evaluation of new problem bilateral leg weakness and unsteadiness that has been present for the last 2 to 3 days, and he was admitted to the hospital had a normal CT of the head and CTA of the head on admission and had an MRI scan that showed a tiny lacunar infarct in the left corpus callosum for which she was given aspirin and statin, but more significantly was found to have a severe spinal cord compression at C4-5 with myelomalacia and a noted cord compression at C6-7 that was also significant, and neurosurgery has seen him and has set him up for follow-up in 3 months time from the stroke to consider surgery in view of the stroke itself. Patient still remains myelopathic with mild weakness in the left arm and left leg and spastic slightly clumsy gait left side worse than the right. He has better than he was in the hospital however. He did finish his course of therapy. He is trying to avoid alcohol at this time and is homeless and has been housed by [facility] He denies any weakness in his arms, but does have a history of drinking somewhat heavily at times, said he stopped 2 days ago but still had a blood alcohol level of 208. Patient did have a history of a TIA 1 year ago in January 2019, that was thought to be due to dehydration, alcohol, and not eating properly. Patient denies any chest pain or palpitations, denies any fever or meningismus, denies any head trauma, and denies any major neck pain, denies any other cause of his symptoms. Patient was supposed to be on a baby aspirin but has not been taking his medications. Patient still has some mild neck pain, but no other new weakness or other new focal neurologic deficits. His MRI scans reviewed personally on the PACS system with the patient, and I agree with the diagnosis of a tiny lacunar infarct as probably not even symptomatic, and his severe cord compression. He has no history of atrial fibrillation Past Medical History:  
Diagnosis Date  Alcohol abuse  Bilateral leg weakness  Developmental venous anomaly  History of TIAs History reviewed. No pertinent surgical history. Family History Problem Relation Age of Onset  Lung Cancer Mother  COPD Mother  Lung Cancer Father  COPD Father Social History Tobacco Use  Smoking status: Never Smoker  Smokeless tobacco: Never Used Substance Use Topics  Alcohol use: Yes Comment: social drinker Current Outpatient Medications:  
  atorvastatin (Lipitor) 40 mg tablet, Take 1 Tab by mouth daily. , Disp: 30 Tab, Rfl: 1 
  metoprolol tartrate (LOPRESSOR) 25 mg tablet, Take 0.5 Tabs by mouth two (2) times a day., Disp: 60 Tab, Rfl: 1 
  folic acid (FOLVITE) 1 mg tablet, Take 1 Tab by mouth daily for 60 days. , Disp: 30 Tab, Rfl: 1 
  thiamine mononitrate (B-1) 100 mg tablet, Take 1 Tab by mouth daily for 60 days. , Disp: 30 Tab, Rfl: 1 
  aspirin 81 mg chewable tablet, Take 1 Tab by mouth daily for 90 days. , Disp: 30 Tab, Rfl: 2   cholecalciferol (VITAMIN D3) (5000 Units /125 mcg) capsule, Take 2 caps by mouth daily for 3 months, Disp: 60 Cap, Rfl: 4 No Known Allergies MRI Results (most recent): 
Results from [facility] Encounter encounter on 04/23/20 MRI CERV SPINE WO CONT Narrative EXAM: MRI CERV SPINE WO CONT INDICATION: Bilateral lower extremity weakness. Clinical differential diagnosis 
includes myelopathy.  Hospitalization for workup of vertigo, slurred speech, and 
 ataxia. COMPARISON: None TECHNIQUE: MR imaging of the cervical spine was performed using the following 
sequences: sagittal T1, T2, STIR;  axial T2, T1 performed on the 3 Chasity magnet. CONTRAST:  None. FINDINGS: Congenitally slender cervical spinal canal measures 8 mm in diameter 
at C4. Cervical kyphosis is mild and due to patient positioning or muscle spasm. No 
anterior or posterior subluxation. Vertebral body heights are maintained. Degenerative bone marrow edema is mild within C4-C7 vertebral bodies. No marrow 
replacing process. Multilevel facet arthrosis is greatest at C4-C5. Disc 
desiccation is moderate in greatest at C6-C7. No discitis. The craniocervical junction is intact. Hyperintense T2 signal in the central 
spinal cord is predominantly linear and C3-C5. There is heterogeneous horizontal 
signal in the spinal cord at 4-C5. Mild cord compression at C4-C5. No cord 
expansion or syrinx. The paraspinal soft tissues are within normal limits. C2-C3: No herniation or stenosis. C3-C4: Posterior disc osteophyte complex. Mild central spinal canal stenosis. C4-C5: Posterior disc osteophyte complex. Severe central spinal canal stenosis. Severe right and mild left foraminal stenosis. C5-C6: Posterior disc osteophyte complex. Moderate right foraminal stenosis. C6-C7: Posterior disc osteophyte complex. Moderate central spinal canal 
stenosis. Moderate bilateral foraminal stenosis. C7-T1: No herniation or stenosis. Impression IMPRESSION: 
 
1. C4-C5 severe central spinal canal stenosis, cord compression, and cord 
myelomalacia more likely than cord contusion. 2. C6-C7 moderate central spinal canal stenosis. 3. Degenerative disease is superimposed on a congenitally slender cervical 
spinal canal. Stenoses are detailed above. Results from [facility] Encounter encounter on 04/23/20 MRI CERV SPINE WO CONT Narrative EXAM: MRI CERV SPINE WO CONT INDICATION: Bilateral lower extremity weakness. Clinical differential diagnosis 
includes myelopathy. Hospitalization for workup of vertigo, slurred speech, and 
ataxia. COMPARISON: None TECHNIQUE: MR imaging of the cervical spine was performed using the following 
sequences: sagittal T1, T2, STIR;  axial T2, T1 performed on the 3 Chasity magnet. CONTRAST:  None. FINDINGS: Congenitally slender cervical spinal canal measures 8 mm in diameter 
at C4. Cervical kyphosis is mild and due to patient positioning or muscle spasm. No 
anterior or posterior subluxation. Vertebral body heights are maintained. Degenerative bone marrow edema is mild within C4-C7 vertebral bodies. No marrow 
replacing process. Multilevel facet arthrosis is greatest at C4-C5. Disc 
desiccation is moderate in greatest at C6-C7. No discitis. The craniocervical junction is intact. Hyperintense T2 signal in the central 
spinal cord is predominantly linear and C3-C5. There is heterogeneous horizontal 
signal in the spinal cord at 4-C5. Mild cord compression at C4-C5. No cord 
expansion or syrinx. The paraspinal soft tissues are within normal limits. C2-C3: No herniation or stenosis. C3-C4: Posterior disc osteophyte complex. Mild central spinal canal stenosis. C4-C5: Posterior disc osteophyte complex. Severe central spinal canal stenosis. Severe right and mild left foraminal stenosis. C5-C6: Posterior disc osteophyte complex. Moderate right foraminal stenosis. C6-C7: Posterior disc osteophyte complex. Moderate central spinal canal 
stenosis. Moderate bilateral foraminal stenosis. C7-T1: No herniation or stenosis. Impression IMPRESSION: 
 
1. C4-C5 severe central spinal canal stenosis, cord compression, and cord 
myelomalacia more likely than cord contusion. 2. C6-C7 moderate central spinal canal stenosis.  
3. Degenerative disease is superimposed on a congenitally slender cervical 
 spinal canal. Stenoses are detailed above. Review of Systems: A comprehensive review of systems was negative except for: Neurological: positive for paresthesia, coordination problems, gait problems and weakness Behvioral/Psych: positive for anxiety and depression Vitals:  
 06/23/20 1205 BP: 128/82 Pulse: 60 SpO2: 99% Weight: 200 lb (90.7 kg) Height: 5' 11\" (1.803 m) Objective: I 
 
 
NEUROLOGICAL EXAM: 
 
Appearance: The patient is fairly developed and nourished, provides a fair history and is in no acute distress. Mental Status: Oriented to time, place and person, and the president, cognitive function is normal and speech is fluent and no aphasia or dysarthria. Mood and affect appropriate. Cranial Nerves:   Intact visual fields. Fundi are benign, disc are flat, no lesions seen on funduscopy. MUSHTAQ, EOM's full, no nystagmus, no ptosis. Facial sensation is normal. Corneal reflexes are not tested. Facial movement is symmetric. Hearing is normal bilaterally. Palate is midline with normal sternocleidomastoid and trapezius muscles are normal. Tongue is midline. Neck without meningismus or bruits Temporal arteries are not tender or enlarged TMJ areas are not tender on palpation Motor:  4/5 strength in upper and lower proximal and distal muscles, with the left side being slightly weaker than the right. Normal bulk and tone. No fasciculations. Rapid alternating movement is symmetric and slow on the left Reflexes:   Deep tendon reflexes 2+/4 and symmetrical in the upper extremity, and 3+ knee jerks bilaterally, and 1+ ankle jerks bilaterally. No babinski or clonus present Sensory:   Abnormal to touch, pinprick and vibration and temperature in his feet. DSS is intact Gait:  Abnormal gait with patient mildly weak and unsteady with wide-based gait. Tremor:   No tremor noted.   
Cerebellar:   Mildly abnormal cerebellar signs present on Romberg and tandem testing and finger-nose-finger exam.  
Neurovascular:  Normal heart sounds and regular rhythm, peripheral pulses decreased, and no carotid bruits. Assessment:  
 
Active Problems: * No active hospital problems. * 
 
 
Plan:  
 
Patient with persistent and symptomatic cervical myelopathy, with mild left hemiparesis and unsteady myelopathic gait and needs decompression after his 3-month interval.  Patient stroke was April 24 of any time after that July 24 he can have surgery. Patient is to call Dr. Jessica Garcia office for follow-up visit time, and surgery to be scheduled for his symptomatic myelopathy He is on vitamin supplements and not drinking supposedly. He may have a component of mild cerebellar ataxia secondary to his drinking. We will see the patient again in 6 months time or earlier as needed. Discussed this condition with the patient 25 minutes today and over his exam, and why he needs surgery, he is advised to be extremely careful to avoid any neck trauma in the interim. We will follow carefully with you. Continue vitamin supplementation and exercise program 
Will call if any change. Signed By: Herrera Rios MD   
 June 23, 2020 CC: Victoria Levine MD 
FAX: [phone] If you have questions, please do not hesitate to call me. I look forward to following Mr. Yogesh Ureña along with you. Sincerely, Herrera Rios MD

## 2020-06-23 NOTE — PROGRESS NOTES
Consult  REFERRED BY:  Dolly Cain MD    CHIEF COMPLAINT: Bilateral leg weakness      Subjective:     David Morton is a 45 y.o. right-handed male seen for evaluation of new problem bilateral leg weakness and unsteadiness that has been present for the last 2 to 3 days, and he was admitted to the hospital had a normal CT of the head and CTA of the head on admission and had an MRI scan that showed a tiny lacunar infarct in the left corpus callosum for which she was given aspirin and statin, but more significantly was found to have a severe spinal cord compression at C4-5 with myelomalacia and a noted cord compression at C6-7 that was also significant, and neurosurgery has seen him and has set him up for follow-up in 3 months time from the stroke to consider surgery in view of the stroke itself. Patient still remains myelopathic with mild weakness in the left arm and left leg and spastic slightly clumsy gait left side worse than the right. He has better than he was in the hospital however. He did finish his course of therapy. He is trying to avoid alcohol at this time and is homeless and has been housed by [facility]. Inna Espinal He denies any weakness in his arms, but does have a history of drinking somewhat heavily at times, said he stopped 2 days ago but still had a blood alcohol level of 208. Patient did have a history of a TIA 1 year ago in January 2019, that was thought to be due to dehydration, alcohol, and not eating properly. Patient denies any chest pain or palpitations, denies any fever or meningismus, denies any head trauma, and denies any major neck pain, denies any other cause of his symptoms. Patient was supposed to be on a baby aspirin but has not been taking his medications. Patient still has some mild neck pain, but no other new weakness or other new focal neurologic deficits.   His MRI scans reviewed personally on the PACS system with the patient, and I agree with the diagnosis of a tiny lacunar infarct as probably not even symptomatic, and his severe cord compression. He has no history of atrial fibrillation    Past Medical History:   Diagnosis Date    Alcohol abuse     Bilateral leg weakness     Developmental venous anomaly     History of TIAs       History reviewed. No pertinent surgical history. Family History   Problem Relation Age of Onset    Lung Cancer Mother     COPD Mother     Lung Cancer Father     COPD Father       Social History     Tobacco Use    Smoking status: Never Smoker    Smokeless tobacco: Never Used   Substance Use Topics    Alcohol use: Yes     Comment: social drinker         Current Outpatient Medications:     atorvastatin (Lipitor) 40 mg tablet, Take 1 Tab by mouth daily. , Disp: 30 Tab, Rfl: 1    metoprolol tartrate (LOPRESSOR) 25 mg tablet, Take 0.5 Tabs by mouth two (2) times a day., Disp: 60 Tab, Rfl: 1    folic acid (FOLVITE) 1 mg tablet, Take 1 Tab by mouth daily for 60 days. , Disp: 30 Tab, Rfl: 1    thiamine mononitrate (B-1) 100 mg tablet, Take 1 Tab by mouth daily for 60 days. , Disp: 30 Tab, Rfl: 1    aspirin 81 mg chewable tablet, Take 1 Tab by mouth daily for 90 days. , Disp: 30 Tab, Rfl: 2    cholecalciferol (VITAMIN D3) (5000 Units /125 mcg) capsule, Take 2 caps by mouth daily for 3 months, Disp: 60 Cap, Rfl: 4        No Known Allergies   MRI Results (most recent):  Results from [place] encounter on 04/23/20   MRI CERV SPINE WO CONT    Narrative EXAM: MRI CERV SPINE WO CONT    INDICATION: Bilateral lower extremity weakness. Clinical differential diagnosis  includes myelopathy. Hospitalization for workup of vertigo, slurred speech, and  ataxia. COMPARISON: None    TECHNIQUE: MR imaging of the cervical spine was performed using the following  sequences: sagittal T1, T2, STIR;  axial T2, T1 performed on the 3 Chasity magnet. CONTRAST:  None. FINDINGS: Congenitally slender cervical spinal canal measures 8 mm in diameter  at C4.     Cervical kyphosis is mild and due to patient positioning or muscle spasm. No  anterior or posterior subluxation. Vertebral body heights are maintained. Degenerative bone marrow edema is mild within C4-C7 vertebral bodies. No marrow  replacing process. Multilevel facet arthrosis is greatest at C4-C5. Disc  desiccation is moderate in greatest at C6-C7. No discitis. The craniocervical junction is intact. Hyperintense T2 signal in the central  spinal cord is predominantly linear and C3-C5. There is heterogeneous horizontal  signal in the spinal cord at 4-C5. Mild cord compression at C4-C5. No cord  expansion or syrinx. The paraspinal soft tissues are within normal limits. C2-C3: No herniation or stenosis. C3-C4: Posterior disc osteophyte complex. Mild central spinal canal stenosis. C4-C5: Posterior disc osteophyte complex. Severe central spinal canal stenosis. Severe right and mild left foraminal stenosis. C5-C6: Posterior disc osteophyte complex. Moderate right foraminal stenosis. C6-C7: Posterior disc osteophyte complex. Moderate central spinal canal  stenosis. Moderate bilateral foraminal stenosis. C7-T1: No herniation or stenosis. Impression IMPRESSION:    1. C4-C5 severe central spinal canal stenosis, cord compression, and cord  myelomalacia more likely than cord contusion. 2. C6-C7 moderate central spinal canal stenosis. 3. Degenerative disease is superimposed on a congenitally slender cervical  spinal canal. Stenoses are detailed above. Results from [place] encounter on 04/23/20   MRI CERV SPINE WO CONT    Narrative EXAM: MRI CERV SPINE WO CONT    INDICATION: Bilateral lower extremity weakness. Clinical differential diagnosis  includes myelopathy. Hospitalization for workup of vertigo, slurred speech, and  ataxia.     COMPARISON: None    TECHNIQUE: MR imaging of the cervical spine was performed using the following  sequences: sagittal T1, T2, STIR;  axial T2, T1 performed on the 3 Chasity magnet. CONTRAST:  None. FINDINGS: Congenitally slender cervical spinal canal measures 8 mm in diameter  at C4. Cervical kyphosis is mild and due to patient positioning or muscle spasm. No  anterior or posterior subluxation. Vertebral body heights are maintained. Degenerative bone marrow edema is mild within C4-C7 vertebral bodies. No marrow  replacing process. Multilevel facet arthrosis is greatest at C4-C5. Disc  desiccation is moderate in greatest at C6-C7. No discitis. The craniocervical junction is intact. Hyperintense T2 signal in the central  spinal cord is predominantly linear and C3-C5. There is heterogeneous horizontal  signal in the spinal cord at 4-C5. Mild cord compression at C4-C5. No cord  expansion or syrinx. The paraspinal soft tissues are within normal limits. C2-C3: No herniation or stenosis. C3-C4: Posterior disc osteophyte complex. Mild central spinal canal stenosis. C4-C5: Posterior disc osteophyte complex. Severe central spinal canal stenosis. Severe right and mild left foraminal stenosis. C5-C6: Posterior disc osteophyte complex. Moderate right foraminal stenosis. C6-C7: Posterior disc osteophyte complex. Moderate central spinal canal  stenosis. Moderate bilateral foraminal stenosis. C7-T1: No herniation or stenosis. Impression IMPRESSION:    1. C4-C5 severe central spinal canal stenosis, cord compression, and cord  myelomalacia more likely than cord contusion. 2. C6-C7 moderate central spinal canal stenosis. 3. Degenerative disease is superimposed on a congenitally slender cervical  spinal canal. Stenoses are detailed above.      Review of Systems:  A comprehensive review of systems was negative except for: Neurological: positive for paresthesia, coordination problems, gait problems and weakness  Behvioral/Psych: positive for anxiety and depression   Vitals:    06/23/20 1205   BP: 128/82   Pulse: 60   SpO2: 99%   Weight: 200 lb (90.7 kg)   Height: 5' 11\" (1.803 m)     Objective:     I      NEUROLOGICAL EXAM:    Appearance: The patient is fairly developed and nourished, provides a fair history and is in no acute distress. Mental Status: Oriented to time, place and person, and the president, cognitive function is normal and speech is fluent and no aphasia or dysarthria. Mood and affect appropriate. Cranial Nerves:   Intact visual fields. Fundi are benign, disc are flat, no lesions seen on funduscopy. MUSHTAQ, EOM's full, no nystagmus, no ptosis. Facial sensation is normal. Corneal reflexes are not tested. Facial movement is symmetric. Hearing is normal bilaterally. Palate is midline with normal sternocleidomastoid and trapezius muscles are normal. Tongue is midline. Neck without meningismus or bruits  Temporal arteries are not tender or enlarged  TMJ areas are not tender on palpation   Motor:  4/5 strength in upper and lower proximal and distal muscles, with the left side being slightly weaker than the right. Normal bulk and tone. No fasciculations. Rapid alternating movement is symmetric and slow on the left    Reflexes:   Deep tendon reflexes 2+/4 and symmetrical in the upper extremity, and 3+ knee jerks bilaterally, and 1+ ankle jerks bilaterally. No babinski or clonus present   Sensory:   Abnormal to touch, pinprick and vibration and temperature in his feet. DSS is intact   Gait:  Abnormal gait with patient mildly weak and unsteady with wide-based gait. Tremor:   No tremor noted. Cerebellar:   Mildly abnormal cerebellar signs present on Romberg and tandem testing and finger-nose-finger exam.   Neurovascular:  Normal heart sounds and regular rhythm, peripheral pulses decreased, and no carotid bruits. Assessment:     Active Problems:    * No active hospital problems.  *      Plan:     Patient with persistent and symptomatic cervical myelopathy, with mild left hemiparesis and unsteady myelopathic gait and needs decompression after his 3-month interval.  Patient stroke was April 24 of any time after that July 24 he can have surgery. Patient is to call Dr. Gisele Garcia office for follow-up visit time, and surgery to be scheduled for his symptomatic myelopathy  He is on vitamin supplements and not drinking supposedly. He may have a component of mild cerebellar ataxia secondary to his drinking. We will see the patient again in 6 months time or earlier as needed. Discussed this condition with the patient 25 minutes today and over his exam, and why he needs surgery, he is advised to be extremely careful to avoid any neck trauma in the interim. We will follow carefully with you. Continue vitamin supplementation and exercise program  Will call if any change.     Signed By: Rosalie Nunez MD     June 23, 2020       CC: Rajinder Malhotra MD  FAX: [phone]

## 2020-06-30 ENCOUNTER — TELEPHONE (OUTPATIENT)
Dept: CARDIOLOGY CLINIC | Age: 50
End: 2020-06-30

## 2020-06-30 NOTE — TELEPHONE ENCOUNTER
Please call patient wants to know what the plan is for surgery.     [phone]    Vascular    Thanks  Zeinab Reveles

## 2020-06-30 NOTE — TELEPHONE ENCOUNTER
Please see Neurology office visit in cc and advise on surgery. Thanks. Message   Received: Today   Message Contents   MD Tika Adams, HERRERA   Caller: Unspecified Kierra Cox, 12:38 PM)             Neurology did not mention anything about clearance from me for surgery. Can you check with patient if they ever talked about PFO. Called pt,verified pt with two pt identifiers, advised pt we can see the note from Neurology in cc since we are on the same system, however the Neurologist did not specify if he is cleared or not for the PFO closure with . advised he needs to call the Neurology office and ask the Neurology to advise him if he is cleared for his PFO closure and call me back. Pt verbalized understanding.

## 2020-07-01 ENCOUNTER — TELEPHONE (OUTPATIENT)
Dept: NEUROLOGY | Age: 50
End: 2020-07-01

## 2020-07-01 NOTE — TELEPHONE ENCOUNTER
----- Message from Magdalene Soliz sent at 7/1/2020  2:20 PM EDT -----  Regarding: Dr. Wilder Neves  Pt request for a call back from the practice to discuss his Cardiology office Dr. Campos Box at [phone] sending his paperwork for his surgery. Best contact number is [phone].

## 2020-07-06 ENCOUNTER — TELEPHONE (OUTPATIENT)
Dept: NEUROLOGY | Age: 50
End: 2020-07-06

## 2020-07-06 NOTE — TELEPHONE ENCOUNTER
----- Message from Lelia Szymanski sent at 2020  2:20 PM EDT -----  Regarding: URIAH/MD/TELEPHONE  Contact: [phone]  General Message/Vendor Call      Patient's first and last name:Nick Mac  : 1970  ID numbers:#[id number] [id number]    Caller's first and last name:  Wendy Sterling  Reason for call: Callback from the nurse  Callback required yes/no and why: Yes  Best contact number(s): [phone]    Details to clarify the request: Patient waiting for a callback from the nurse concerning his neck surgery.

## 2020-07-07 NOTE — TELEPHONE ENCOUNTER
I am Dr. Dewey Izquierdo nurse. Could you scan in a form that would need to be filled out by the neurologist for a surgery needed per Dr. Adriel Mccracken?   I can print it off from there

## 2020-07-07 NOTE — TELEPHONE ENCOUNTER
We do not have a form for clearance.  is looking to perform a PFO closure on pt but was looking at clearance from Neurology before he can proceed. The dr can look at 5/11/20 office note for further info. If he needs to talk to  regarding this, just let me know and I can have the dr call him. Thanks so much for your help!

## 2020-08-08 RX ORDER — FOLIC ACID 1 MG/1
TABLET ORAL
Qty: 30 TAB | Refills: 1 | Status: SHIPPED | OUTPATIENT
Start: 2020-08-08

## 2020-08-08 RX ORDER — CALCIUM CARBONATE/VITAMIN D3 600 MG-10
TABLET ORAL
Qty: 30 TAB | Refills: 1 | Status: SHIPPED | OUTPATIENT
Start: 2020-08-08

## 2020-08-18 ENCOUNTER — OFFICE VISIT (OUTPATIENT)
Dept: CARDIOLOGY CLINIC | Age: 50
End: 2020-08-18
Payer: MEDICAID

## 2020-08-18 VITALS
BODY MASS INDEX: 30.39 KG/M2 | DIASTOLIC BLOOD PRESSURE: 80 MMHG | RESPIRATION RATE: 16 BRPM | SYSTOLIC BLOOD PRESSURE: 130 MMHG | WEIGHT: 217.1 LBS | HEIGHT: 71 IN | HEART RATE: 60 BPM | OXYGEN SATURATION: 98 %

## 2020-08-18 DIAGNOSIS — Z86.73 HISTORY OF TIAS: ICD-10-CM

## 2020-08-18 DIAGNOSIS — I10 ESSENTIAL HYPERTENSION: ICD-10-CM

## 2020-08-18 DIAGNOSIS — E78.00 HYPERCHOLESTEREMIA: ICD-10-CM

## 2020-08-18 DIAGNOSIS — Q21.12 PFO (PATENT FORAMEN OVALE): Primary | ICD-10-CM

## 2020-08-18 PROCEDURE — 99214 OFFICE O/P EST MOD 30 MIN: CPT | Performed by: INTERNAL MEDICINE

## 2020-08-18 PROCEDURE — 93000 ELECTROCARDIOGRAM COMPLETE: CPT | Performed by: INTERNAL MEDICINE

## 2020-08-18 RX ORDER — IBUPROFEN 800 MG/1
TABLET ORAL
COMMUNITY
End: 2021-03-08 | Stop reason: SDDI

## 2020-08-18 RX ORDER — ASPIRIN 81 MG/1
TABLET ORAL DAILY
COMMUNITY
End: 2020-09-25 | Stop reason: SDUPTHER

## 2020-08-18 NOTE — PROGRESS NOTES
Subjective/HPI:     Brooks washington a 45 y.o. male is here for f/u appt. The patient denies chest pain/ shortness of breath, orthopnea, PND, LE edema, palpitations, syncope, presyncope or fatigue. PCP Provider  Sruthi Narvaez MD  Past Medical History:   Diagnosis Date    Alcohol abuse     Bilateral leg weakness     Developmental venous anomaly     History of TIAs       No past surgical history on file. No Known Allergies   Family History   Problem Relation Age of Onset    Lung Cancer Mother     COPD Mother     Lung Cancer Father     COPD Father       Current Outpatient Medications   Medication Sig    ibuprofen (MOTRIN) 800 mg tablet Take  by mouth.  aspirin delayed-release 81 mg tablet Take  by mouth daily.  folic acid (FOLVITE) 1 mg tablet TAKE 1 TABLET BY MOUTH EVERY DAY    Vitamin B-1, mononitrate, 100 mg tablet TAKE 1 TAB BY MOUTH DAILY FOR 60 DAYS.  atorvastatin (Lipitor) 40 mg tablet Take 1 Tab by mouth daily. (Patient taking differently: Take 40 mg by mouth two (2) times a day.)    metoprolol tartrate (LOPRESSOR) 25 mg tablet Take 0.5 Tabs by mouth two (2) times a day. No current facility-administered medications for this visit.        Vitals:    08/18/20 1158   BP: 130/80   Pulse: 60   Resp: 16   SpO2: 98%   Weight: 217 lb 1.6 oz (98.5 kg)   Height: 5' 11\" (1.803 m)     Social History     Socioeconomic History    Marital status: SINGLE     Spouse name: Not on file    Number of children: Not on file    Years of education: Not on file    Highest education level: Not on file   Occupational History    Not on file   Social Needs    Financial resource strain: Not on file    Food insecurity     Worry: Not on file     Inability: Not on file    Transportation needs     Medical: Not on file     Non-medical: Not on file   Tobacco Use    Smoking status: Never Smoker    Smokeless tobacco: Never Used   Substance and Sexual Activity    Alcohol use: Yes     Comment: social drinker    Drug use: No    Sexual activity: Not on file   Lifestyle    Physical activity     Days per week: Not on file     Minutes per session: Not on file    Stress: Not on file   Relationships    Social connections     Talks on phone: Not on file     Gets together: Not on file     Attends [employer]ical service: Not on file     Active member of club or organization: Not on file     Attends meetings of clubs or organizations: Not on file     Relationship status: Not on file    Intimate partner violence     Fear of current or ex partner: Not on file     Emotionally abused: Not on file     Physically abused: Not on file     Forced sexual activity: Not on file   Other Topics Concern    Not on file   Social History Narrative    Not on file       I have reviewed the nurses notes, vitals, problem list, allergy list, medical history, family, social history and medications. Review of Symptoms:    General: Pt denies excessive weight gain or loss. Pt is able to conduct ADL's  HEENT: Denies blurred vision, headaches, epistaxis and difficulty swallowing. Respiratory: Denies shortness of breath, DIAZ, wheezing or stridor. Cardiovascular: Denies precordial pain, palpitations, edema or PND  Gastrointestinal: Denies poor appetite, indigestion, abdominal pain or blood in stool  Urinary: Denies dysuria, pyuria  Musculoskeletal: Denies pain or swelling from muscles or joints  Neurologic: Denies tremor, paresthesias, or sensory motor disturbance  Skin: Denies rash, itching or texture change. Psych: Denies depression        Physical Exam:      General: Well developed, in no acute distress, cooperative and alert  HEENT: No carotid bruits, no JVD, trach is midline. Neck Supple, PEERL, EOM intact. Heart:  Normal S1/S2 negative S3 or S4. Regular, no murmur, gallop or rub. Respiratory: Clear bilaterally x 4, no wheezing or rales  Abdomen:   Soft, non-tender, no masses, bowel sounds are active.    Extremities:  No edema, normal cap refill, no cyanosis, atraumatic. Neuro: A&Ox3, speech clear, gait stable. Skin: Skin color is normal. No rashes or lesions. Non diaphoretic  Vascular: 2+ pulses symmetric in all extremities    Cardiographics    ECG: SR    Results for orders placed or performed during the hospital encounter of 04/23/20   EKG, 12 LEAD, INITIAL   Result Value Ref Range    Ventricular Rate 90 BPM    Atrial Rate 90 BPM    P-R Interval 144 ms    QRS Duration 98 ms    Q-T Interval 376 ms    QTC Calculation (Bezet) 459 ms    Calculated P Axis 53 degrees    Calculated R Axis 66 degrees    Calculated T Axis 73 degrees    Diagnosis       Normal sinus rhythm  Normal ECG  No previous ECGs available  Confirmed by Peggy Saha P.ALMA ([id number]) on 4/24/2020 4:42:36 PM           Cardiology Labs:  Lab Results   Component Value Date/Time    Cholesterol, total 204 (H) 04/24/2020 04:08 AM    HDL Cholesterol 109 04/24/2020 04:08 AM    LDL, calculated 83.4 04/24/2020 04:08 AM    VLDL, calculated 11.6 04/24/2020 04:08 AM    CHOL/HDL Ratio 1.9 04/24/2020 04:08 AM     Lab Results   Component Value Date/Time    Sodium 138 04/23/2020 01:26 PM    Potassium 4.2 04/23/2020 01:26 PM    Chloride 101 04/23/2020 01:26 PM    CO2 30 04/23/2020 01:26 PM    Glucose 94 04/23/2020 01:26 PM    BUN 7 04/23/2020 01:26 PM    Creatinine 0.94 04/23/2020 01:26 PM    BUN/Creatinine ratio 7 (L) 04/23/2020 01:26 PM    GFR est AA >60 04/23/2020 01:26 PM    GFR est non-AA >60 04/23/2020 01:26 PM    Calcium 8.2 (L) 04/23/2020 01:26 PM    Anion gap 7 04/23/2020 01:26 PM    Bilirubin, total 0.2 04/23/2020 01:26 PM    ALT (SGPT) 28 04/23/2020 01:26 PM    Alk.  phosphatase 87 04/23/2020 01:26 PM    Protein, total 8.2 04/23/2020 01:26 PM    Albumin 4.1 04/23/2020 01:26 PM    Globulin 4.1 (H) 04/23/2020 01:26 PM    A-G Ratio 1.0 (L) 04/23/2020 01:26 PM     Lab Results   Component Value Date/Time    Hemoglobin A1c 5.8 (H) 04/24/2020 04:08 AM        Assessment:     Assessment: Diagnoses and all orders for this visit:    1. PFO (patent foramen ovale)  -     AMB POC EKG ROUTINE W/ 12 LEADS, INTER & REP    2. Essential hypertension  -     AMB POC EKG ROUTINE W/ 12 LEADS, INTER & REP    3. Hypercholesteremia  -     AMB POC EKG ROUTINE W/ 12 LEADS, INTER & REP        ICD-10-CM ICD-9-CM    1. PFO (patent foramen ovale)  Q21.1 745.5 AMB POC EKG ROUTINE W/ 12 LEADS, INTER & REP   2. Essential hypertension  I10 401.9 AMB POC EKG ROUTINE W/ 12 LEADS, INTER & REP   3. Hypercholesteremia  E78.00 272.0 AMB POC EKG ROUTINE W/ 12 LEADS, INTER & REP          Plan:     1. PFO, CVA/TIA:   RoPE score is 4. 38% chance that stroke is due to PFO and 12% risk of 2 year recurrent of CVA/TIA. Needs BRADY. Will schedule. After BRADY is completed will d/w neurology. Advised patient to continue current meds. 2. Hyperlipidemia: On statin. 3. HTN: BP controlled. Patient seen and examined by me with nurse practitioner. I personally performed all components of the history, physical, and medical decision making and agree with the assessment and plan as noted. As previously discussed will schedule for BRADY. Further recommendations afterwards. I discussed the risks/benefits/alternatives of the procedure with the patient. The patient understands and agrees to proceed.     Krysta Thomas MD

## 2020-08-18 NOTE — PROGRESS NOTES
1. Have you been to the ER, urgent care clinic since your last visit? Hospitalized since your last visit? No    2. Have you seen or consulted any other health care providers outside of the [address] [place] [address] since your last visit? Include any pap smears or colon screening.  NO    Chief Complaint   Patient presents with    Follow-up     PFO    Hypertension    Cholesterol Problem

## 2020-08-19 ENCOUNTER — DOCUMENTATION ONLY (OUTPATIENT)
Dept: CARDIOLOGY CLINIC | Age: 50
End: 2020-08-19

## 2020-08-19 DIAGNOSIS — Q21.12 PFO (PATENT FORAMEN OVALE): ICD-10-CM

## 2020-08-19 DIAGNOSIS — I10 ESSENTIAL HYPERTENSION: ICD-10-CM

## 2020-08-19 DIAGNOSIS — E78.00 HYPERCHOLESTEREMIA: ICD-10-CM

## 2020-08-19 NOTE — PROGRESS NOTES
Confirmed with pt new BRADY date and time per his request. 9/11/2020 arriving at 7am., Covid test to be done 9/7 between 7am-12pm.      Reiterated that he will need a  to STAY during procedure to take him home    Pt stated understanding.

## 2020-09-08 ENCOUNTER — HOSPITAL ENCOUNTER (OUTPATIENT)
Dept: PREADMISSION TESTING | Age: 50
Discharge: HOME | End: 2020-09-08

## 2020-09-09 RX ORDER — GUAIFENESIN 100 MG/5ML
LIQUID (ML) ORAL
Qty: 30 TAB | Refills: 2 | Status: SHIPPED | OUTPATIENT
Start: 2020-09-09 | End: 2021-01-12

## 2020-09-21 ENCOUNTER — HOSPITAL ENCOUNTER (OUTPATIENT)
Dept: PREADMISSION TESTING | Age: 50
Discharge: HOME | End: 2020-09-21
Payer: MEDICAID

## 2020-09-21 PROCEDURE — 87635 SARS-COV-2 COVID-19 AMP PRB: CPT

## 2020-09-22 LAB
HEALTH STATUS, XMCV2T: NORMAL
SARS-COV-2, COV2NT: NOT DETECTED
SOURCE, COVRS: NORMAL
SPECIMEN SOURCE, FCOV2M: NORMAL
SPECIMEN TYPE, XMCV1T: NORMAL

## 2020-09-25 ENCOUNTER — HOSPITAL ENCOUNTER (OUTPATIENT)
Dept: NON INVASIVE DIAGNOSTICS | Age: 50
Discharge: HOME | End: 2020-09-25
Attending: INTERNAL MEDICINE
Payer: MEDICAID

## 2020-09-25 VITALS
SYSTOLIC BLOOD PRESSURE: 121 MMHG | RESPIRATION RATE: 16 BRPM | WEIGHT: 220 LBS | HEART RATE: 70 BPM | DIASTOLIC BLOOD PRESSURE: 92 MMHG | OXYGEN SATURATION: 94 % | BODY MASS INDEX: 30.8 KG/M2 | HEIGHT: 71 IN

## 2020-09-25 DIAGNOSIS — Q21.12 PFO (PATENT FORAMEN OVALE): ICD-10-CM

## 2020-09-25 LAB
ECHO TV REGURGITANT MAX VELOCITY: 189.08 CM/S
ECHO TV REGURGITANT PEAK GRADIENT: 14.3 MMHG
ECHO TV REGURGITANT PEAK GRADIENT: 14.3 MMHG

## 2020-09-25 PROCEDURE — 74011250636 HC RX REV CODE- 250/636: Performed by: INTERNAL MEDICINE

## 2020-09-25 PROCEDURE — 99152 MOD SED SAME PHYS/QHP 5/>YRS: CPT

## 2020-09-25 PROCEDURE — 74011000250 HC RX REV CODE- 250: Performed by: INTERNAL MEDICINE

## 2020-09-25 PROCEDURE — 93325 DOPPLER ECHO COLOR FLOW MAPG: CPT

## 2020-09-25 RX ORDER — LIDOCAINE HYDROCHLORIDE 20 MG/ML
15 SOLUTION OROPHARYNGEAL ONCE
Status: COMPLETED | OUTPATIENT
Start: 2020-09-25 | End: 2020-09-25

## 2020-09-25 RX ORDER — MIDAZOLAM HYDROCHLORIDE 1 MG/ML
.5-2 INJECTION, SOLUTION INTRAMUSCULAR; INTRAVENOUS
Status: DISCONTINUED | OUTPATIENT
Start: 2020-09-25 | End: 2020-09-25

## 2020-09-25 RX ORDER — FENTANYL CITRATE 50 UG/ML
25-50 INJECTION, SOLUTION INTRAMUSCULAR; INTRAVENOUS
Status: DISCONTINUED | OUTPATIENT
Start: 2020-09-25 | End: 2020-09-25

## 2020-09-25 RX ADMIN — MIDAZOLAM 1 MG: 1 INJECTION INTRAMUSCULAR; INTRAVENOUS at 07:56

## 2020-09-25 RX ADMIN — BENZOCAINE, BUTAMBEN, AND TETRACAINE HYDROCHLORIDE 1 SPRAY: .028; .004; .004 AEROSOL, SPRAY TOPICAL at 07:50

## 2020-09-25 RX ADMIN — MIDAZOLAM 2 MG: 1 INJECTION INTRAMUSCULAR; INTRAVENOUS at 07:54

## 2020-09-25 RX ADMIN — MIDAZOLAM 1 MG: 1 INJECTION INTRAMUSCULAR; INTRAVENOUS at 07:51

## 2020-09-25 RX ADMIN — FENTANYL CITRATE 25 MCG: 50 INJECTION, SOLUTION INTRAMUSCULAR; INTRAVENOUS at 07:51

## 2020-09-25 RX ADMIN — FENTANYL CITRATE 25 MCG: 50 INJECTION, SOLUTION INTRAMUSCULAR; INTRAVENOUS at 07:54

## 2020-09-25 RX ADMIN — LIDOCAINE HYDROCHLORIDE 15 ML: 20 SOLUTION ORAL; TOPICAL at 07:50

## 2020-09-25 NOTE — PROGRESS NOTES
Discharge instructions reviewed with patient. Good [employer] representative contacted for . Allowed adequate time to ask questions, all questions answered. Printed copy of AVS given to patient. All belongings gathered, IV and tele discontinued. Transported via wheelchair to main entrance and into care of [facility] .

## 2020-09-25 NOTE — DISCHARGE INSTRUCTIONS
DISCHARGE SUMMARY       The following personal items collected during your admission are returned to you:   Clothing:  shirt        PATIENT INSTRUCTIONS: Continue taking all the same medications. You had a transesophageal echocardiogram, your throat was numbed for the procedure, so start with sips of water and advance diet as swallowing returns to normal. Avoid any scalding hot beverages for the next 2 hours. Call to make an appointment with Dr. Kateri Cowden in 2-4 week(s). What to do at Home:  Recommended activity: No driving today      The discharge information has been reviewed with the PATIENT . The PATIENT  verbalized understanding.

## 2020-09-25 NOTE — PROGRESS NOTES
Patient arrived to Non-Invasive Cardiology Lab for Out Patient BRADY Procedure. Staff introduced to patient. Patient identifiers verified with Name and Date of Birth. Procedure verified with patient. Consent forms reviewed and signed by patient or authorized representative and verified. Allergies verified. Patient informed of procedure and plan of care. Questions answered with review. Patient on cardiac monitor, non-invasive blood pressure, SPO2 monitor. On room air. Patient is A&Ox3. Patient reports no complaints. Patient on stretcher, in low position, with side rails up. Patient instructed to call for assistance as needed.     Ride to be called upon discharge

## 2020-10-16 NOTE — ACP (ADVANCE CARE PLANNING)
Responded to in-basket request for advance medical directive consult. No visitors present. Consulted with nurse, Renetta Vogt. Explained advance medical directive to patient and answered questions he had. Patient has never considered end of life wishes. He also has not thought who he would want to be his medical decision maker. Left document for his review and encouraged him to have end of life conversation with his siblings.     GUICHO Ram, [facility], [address] [place] [address] [employer] Paging Service  [phone]
No

## 2020-10-28 ENCOUNTER — OFFICE VISIT (OUTPATIENT)
Dept: FAMILY MEDICINE CLINIC | Age: 50
End: 2020-10-28
Payer: MEDICAID

## 2020-10-28 VITALS
HEART RATE: 66 BPM | OXYGEN SATURATION: 98 % | BODY MASS INDEX: 30.94 KG/M2 | RESPIRATION RATE: 20 BRPM | HEIGHT: 71 IN | SYSTOLIC BLOOD PRESSURE: 130 MMHG | WEIGHT: 221 LBS | TEMPERATURE: 97.5 F | DIASTOLIC BLOOD PRESSURE: 77 MMHG

## 2020-10-28 DIAGNOSIS — E78.00 HYPERCHOLESTEROLEMIA: Primary | ICD-10-CM

## 2020-10-28 DIAGNOSIS — R35.0 FREQUENCY OF URINATION: ICD-10-CM

## 2020-10-28 DIAGNOSIS — I10 HYPERTENSION, WELL CONTROLLED: ICD-10-CM

## 2020-10-28 DIAGNOSIS — R73.03 BORDERLINE DIABETES MELLITUS: ICD-10-CM

## 2020-10-28 DIAGNOSIS — E55.9 VITAMIN D DEFICIENCY: ICD-10-CM

## 2020-10-28 DIAGNOSIS — Z13.29 SCREENING FOR THYROID DISORDER: ICD-10-CM

## 2020-10-28 PROBLEM — S99.929A INJURY OF FOOT: Status: ACTIVE | Noted: 2020-10-28

## 2020-10-28 PROCEDURE — 99214 OFFICE O/P EST MOD 30 MIN: CPT | Performed by: INTERNAL MEDICINE

## 2020-10-28 RX ORDER — CHOLECALCIFEROL (VITAMIN D3) 125 MCG
10000 CAPSULE ORAL DAILY
Qty: 60 CAP | Refills: 2 | Status: SHIPPED | OUTPATIENT
Start: 2020-10-28

## 2020-10-28 NOTE — PATIENT INSTRUCTIONS
Learning About Vitamin D Why is it important to get enough vitamin D? Your body needs vitamin D to absorb calcium. Calcium keeps your bones and muscles, including your heart, healthy and strong. If your muscles don't get enough calcium, they can cramp, hurt, or feel weak. You may have long-term (chronic) muscle aches and pains. If you don't get enough vitamin D throughout life, you have an increased chance of having thin and brittle bones (osteoporosis) in your later years. Children who don't get enough vitamin D may not grow as much as others their age. They also have a chance of getting a rare disease called rickets. It causes weak bones. Vitamin D and calcium are added to many foods. And your body uses sunshine to make its own vitamin D. How much vitamin D do you need? The [place] of [employer] recommends that people ages 3 through 79 get 600 IU (international units) every day. Adults 71 and older need 800 IU every day. Blood tests for vitamin D can check your vitamin D level. But there is no standard normal range used by all laboratories. You're likely getting enough vitamin D if your levels are in the range of 20 to 50 ng/mL. How can you get more vitamin D? Foods that contain vitamin D include: 
· [place], tuna, and mackerel. These are some of the best foods to eat when you need to get more vitamin D. 
· Cheese, egg yolks, and beef liver. These foods have vitamin D in small amounts. · Milk, soy drinks, orange juice, yogurt, margarine, and some kinds of cereal have vitamin D added to them. Some people don't make vitamin D as well as others. They may have to take extra care in getting enough vitamin D. Things that reduce how much vitamin D your body makes include: · Dark skin, such as many  Americans have. · Age, especially if you are older than 72. · Digestive problems, such as Crohn's or celiac disease. · Liver and kidney disease. Some people who do not get enough vitamin D may need supplements. Are there any risks from taking vitamin D? 
· Too much vitamin D: 
? Can damage your kidneys. ? Can cause nausea and vomiting, constipation, and weakness. ? Raises the amount of calcium in your blood. If this happens, you can get confused or have an irregular heart rhythm. · Vitamin D may interact with other medicines. Tell your doctor about all of the medicines you take, including over-the-counter drugs, herbs, and pills. Tell your doctor about all of your current medical problems. Where can you learn more? Go to http://www.New Relic/ Enter [id number] in the search box to learn more about \"Learning About Vitamin D.\" 
Current as of: August 22, 2019               Content Version: 12.6 © 5249-2540 [employer]. Care instructions adapted under license by [employer] (which disclaims liability or warranty for this information). If you have questions about a medical condition or this instruction, always ask your healthcare professional. [address] any warranty or liability for your use of this information.

## 2020-10-28 NOTE — PROGRESS NOTES
Chief Complaint   Patient presents with   [facility] Follow Up     was seen at [facility] for hip pain      HPI:  Kin Aguayo is a 45 y.o.  male with h/o CVA with ataxia, hypertension, hypercholesterolemia, ETOH abuse presents for hospital follow up. Patient was seen at [facility] for left hip pain. He reports pain has resolved. He also has Echo EF 55-60%, positive bubble study demonstrating right to left atrial shunt / PFO. He was seen as a new patient is August, lab ordered has not yet been done. He says he is here to follow up on results. Advised he to complete blood work. Review of Systems  As per hpi    Past Medical History:   Diagnosis Date    Alcohol abuse     Bilateral leg weakness     Developmental venous anomaly     History of TIAs     Hypercholesterolemia     Hypertension     Stroke [facility])      History reviewed. No pertinent surgical history. Social History     Socioeconomic History    Marital status: SINGLE     Spouse name: Not on file    Number of children: Not on file    Years of education: Not on file    Highest education level: Not on file   Tobacco Use    Smoking status: Former Smoker     Last attempt to quit: 2000     Years since quittin.1    Smokeless tobacco: Never Used   Substance and Sexual Activity    Alcohol use: Not Currently     Comment: social drinker    Drug use: No     Family History   Problem Relation Age of Onset    Lung Cancer Mother     COPD Mother     Lung Cancer Father     COPD Father      Current Outpatient Medications   Medication Sig Dispense Refill    aspirin 81 mg chewable tablet CHEW 1 TABLET BY MOUTH DAILY 30 Tab 2    ibuprofen (MOTRIN) 800 mg tablet Take  by mouth.  folic acid (FOLVITE) 1 mg tablet TAKE 1 TABLET BY MOUTH EVERY DAY 30 Tab 1    Vitamin B-1, mononitrate, 100 mg tablet TAKE 1 TAB BY MOUTH DAILY FOR 60 DAYS. 30 Tab 1    atorvastatin (Lipitor) 40 mg tablet Take 1 Tab by mouth daily.  (Patient taking differently: Take 40 mg by mouth two (2) times a day.) 30 Tab 1    metoprolol tartrate (LOPRESSOR) 25 mg tablet Take 0.5 Tabs by mouth two (2) times a day. 60 Tab 1     No Known Allergies    Objective:  Visit Vitals  /77   Pulse 66   Temp 97.5 °F (36.4 °C) (Temporal)   Resp 20   Ht 5' 11\" (1.803 m)   Wt 221 lb (100.2 kg)   SpO2 98%   BMI 30.82 kg/m²     Physical Exam:   General appearance - alert, well appearing in no distress  Mental status - alert, oriented to person, place, and time  EYE-PERRL, EOMI  Neck - supple, no significant adenopathy   Chest - clear to auscultation, no wheezes, rales or rhonchi  Heart - normal rate, regular rhythm, no murmurs  Abdomen - soft, nontender, nondistended, no organomegaly  Ext-peripheral pulses normal, no pedal edema  Neuro - no focal findings   Back-full range of motion, no tenderness, palpable spasm or pain on motion     Results for orders placed or performed during the hospital encounter of 09/25/20   ECHO BRADY W OR WO CONTRAST   Result Value Ref Range    Triscuspid Valve Regurgitation Peak Gradient 14.30 mmHg    Triscuspid Valve Regurgitation Peak Gradient 14.30 mmHg    TR Max Velocity 189.08 cm/s     Assessment/Plan:  Diagnoses and all orders for this visit:    Hypercholesterolemia  -     LIPID PANEL    Hypertension, well controlled    Screening for thyroid disorder    Vitamin D deficiency  -     VITAMIN D, 25 HYDROXY  -     cholecalciferol (VITAMIN D3) (5000 Units /125 mcg) capsule; Take 2 Caps by mouth daily. , Normal, Disp-60 Cap,R-2    Frequency of urination    Borderline diabetes mellitus  -     HEMOGLOBIN A1C WITH EAG      Patient Instructions        Learning About Vitamin D  Why is it important to get enough vitamin D? Your body needs vitamin D to absorb calcium. Calcium keeps your bones and muscles, including your heart, healthy and strong. If your muscles don't get enough calcium, they can cramp, hurt, or feel weak. You may have long-term (chronic) muscle aches and pains.   If you don't get enough vitamin D throughout life, you have an increased chance of having thin and brittle bones (osteoporosis) in your later years. Children who don't get enough vitamin D may not grow as much as others their age. They also have a chance of getting a rare disease called rickets. It causes weak bones. Vitamin D and calcium are added to many foods. And your body uses sunshine to make its own vitamin D. How much vitamin D do you need? The [place] of Medicine recommends that people ages 3 through 79 get 600 IU (international units) every day. Adults 71 and older need 800 IU every day. Blood tests for vitamin D can check your vitamin D level. But there is no standard normal range used by all laboratories. You're likely getting enough vitamin D if your levels are in the range of 20 to 50 ng/mL. How can you get more vitamin D? Foods that contain vitamin D include:  · [place], tuna, and mackerel. These are some of the best foods to eat when you need to get more vitamin D.  · Cheese, egg yolks, and beef liver. These foods have vitamin D in small amounts. · Milk, soy drinks, orange juice, yogurt, margarine, and some kinds of cereal have vitamin D added to them. Some people don't make vitamin D as well as others. They may have to take extra care in getting enough vitamin D. Things that reduce how much vitamin D your body makes include:  · Dark skin, such as many  Americans have. · Age, especially if you are older than 72. · Digestive problems, such as Crohn's or celiac disease. · Liver and kidney disease. Some people who do not get enough vitamin D may need supplements. Are there any risks from taking vitamin D?  · Too much vitamin D:  ? Can damage your kidneys. ? Can cause nausea and vomiting, constipation, and weakness. ? Raises the amount of calcium in your blood. If this happens, you can get confused or have an irregular heart rhythm. · Vitamin D may interact with other medicines.  Tell your doctor about all of the medicines you take, including over-the-counter drugs, herbs, and pills. Tell your doctor about all of your current medical problems. Where can you learn more? Go to http://www.TappIn.com/  Enter Y895 in the search box to learn more about \"Learning About Vitamin D.\"  Current as of: August 22, 2019               Content Version: 12.6  © 1864-7123 [employer]. Care instructions adapted under license by [employer] (which disclaims liability or warranty for this information). If you have questions about a medical condition or this instruction, always ask your healthcare professional. [place] [address] any warranty or liability for your use of this information. Follow-up and Dispositions    · Return in about 3 months (around 1/28/2021), or if symptoms worsen or fail to improve, for routine follow up.

## 2020-10-29 DIAGNOSIS — R97.20 ELEVATED PSA MEASUREMENT: Primary | ICD-10-CM

## 2020-10-29 LAB
25(OH)D3+25(OH)D2 SERPL-MCNC: 17.2 NG/ML (ref 30–100)
ALBUMIN SERPL-MCNC: 4.4 G/DL (ref 3.8–4.9)
ALBUMIN/GLOB SERPL: 1.6 {RATIO} (ref 1.2–2.2)
ALP SERPL-CCNC: 72 IU/L (ref 39–117)
ALT SERPL-CCNC: 22 IU/L (ref 0–44)
APPEARANCE UR: CLEAR
AST SERPL-CCNC: 19 IU/L (ref 0–40)
BASOPHILS # BLD AUTO: 0.1 X10E3/UL (ref 0–0.2)
BASOPHILS NFR BLD AUTO: 2 %
BILIRUB SERPL-MCNC: 0.3 MG/DL (ref 0–1.2)
BILIRUB UR QL STRIP: NEGATIVE
BUN SERPL-MCNC: 10 MG/DL (ref 6–24)
BUN/CREAT SERPL: 10 (ref 9–20)
CALCIUM SERPL-MCNC: 9.6 MG/DL (ref 8.7–10.2)
CHLORIDE SERPL-SCNC: 103 MMOL/L (ref 96–106)
CHOLEST SERPL-MCNC: 198 MG/DL (ref 100–199)
CO2 SERPL-SCNC: 24 MMOL/L (ref 20–29)
COLOR UR: YELLOW
CREAT SERPL-MCNC: 0.98 MG/DL (ref 0.76–1.27)
EOSINOPHIL # BLD AUTO: 0.3 X10E3/UL (ref 0–0.4)
EOSINOPHIL NFR BLD AUTO: 7 %
ERYTHROCYTE [DISTWIDTH] IN BLOOD BY AUTOMATED COUNT: 12.3 % (ref 11.6–15.4)
EST. AVERAGE GLUCOSE BLD GHB EST-MCNC: 134 MG/DL
GLOBULIN SER CALC-MCNC: 2.8 G/DL (ref 1.5–4.5)
GLUCOSE SERPL-MCNC: 75 MG/DL (ref 65–99)
GLUCOSE UR QL: NEGATIVE
HBA1C MFR BLD: 6.3 % (ref 4.8–5.6)
HCT VFR BLD AUTO: 45.1 % (ref 37.5–51)
HDLC SERPL-MCNC: 57 MG/DL
HGB BLD-MCNC: 15.2 G/DL (ref 13–17.7)
HGB UR QL STRIP: NEGATIVE
IMM GRANULOCYTES # BLD AUTO: 0 X10E3/UL (ref 0–0.1)
IMM GRANULOCYTES NFR BLD AUTO: 0 %
KETONES UR QL STRIP: NEGATIVE
LDLC SERPL CALC-MCNC: 128 MG/DL (ref 0–99)
LEUKOCYTE ESTERASE UR QL STRIP: NEGATIVE
LYMPHOCYTES # BLD AUTO: 2.1 X10E3/UL (ref 0.7–3.1)
LYMPHOCYTES NFR BLD AUTO: 50 %
MCH RBC QN AUTO: 29.9 PG (ref 26.6–33)
MCHC RBC AUTO-ENTMCNC: 33.7 G/DL (ref 31.5–35.7)
MCV RBC AUTO: 89 FL (ref 79–97)
MICRO URNS: NORMAL
MONOCYTES # BLD AUTO: 0.4 X10E3/UL (ref 0.1–0.9)
MONOCYTES NFR BLD AUTO: 10 %
NEUTROPHILS # BLD AUTO: 1.3 X10E3/UL (ref 1.4–7)
NEUTROPHILS NFR BLD AUTO: 31 %
NITRITE UR QL STRIP: NEGATIVE
PH UR STRIP: 7.5 [PH] (ref 5–7.5)
PLATELET # BLD AUTO: 270 X10E3/UL (ref 150–450)
POTASSIUM SERPL-SCNC: 4.4 MMOL/L (ref 3.5–5.2)
PROT SERPL-MCNC: 7.2 G/DL (ref 6–8.5)
PROT UR QL STRIP: NEGATIVE
PSA SERPL-MCNC: 4.6 NG/ML (ref 0–4)
RBC # BLD AUTO: 5.09 X10E6/UL (ref 4.14–5.8)
SODIUM SERPL-SCNC: 139 MMOL/L (ref 134–144)
SP GR UR: 1.01 (ref 1–1.03)
TRIGL SERPL-MCNC: 70 MG/DL (ref 0–149)
TSH SERPL DL<=0.005 MIU/L-ACNC: 1.99 UIU/ML (ref 0.45–4.5)
UROBILINOGEN UR STRIP-MCNC: 0.2 MG/DL (ref 0.2–1)
VLDLC SERPL CALC-MCNC: 13 MG/DL (ref 5–40)
WBC # BLD AUTO: 4.1 X10E3/UL (ref 3.4–10.8)

## 2020-12-10 DIAGNOSIS — I10 HYPERTENSION GOAL BP (BLOOD PRESSURE) < 130/80: ICD-10-CM

## 2020-12-10 NOTE — TELEPHONE ENCOUNTER
Patient called regarding rx: metoprolol tartrate (LOPRESSOR) 25 mg tablet . The pharmacy did not have this rx and told patient to call back here. Please call @[phone].

## 2020-12-12 RX ORDER — METOPROLOL TARTRATE 25 MG/1
12.5 TABLET, FILM COATED ORAL 2 TIMES DAILY
Qty: 60 TAB | Refills: 1 | Status: SHIPPED | OUTPATIENT
Start: 2020-12-12 | End: 2021-03-08 | Stop reason: ALTCHOICE

## 2020-12-16 ENCOUNTER — OFFICE VISIT (OUTPATIENT)
Dept: NEUROLOGY | Age: 50
End: 2020-12-16
Payer: MEDICAID

## 2020-12-16 DIAGNOSIS — R27.0 ATAXIA: ICD-10-CM

## 2020-12-16 DIAGNOSIS — Q21.12 PFO (PATENT FORAMEN OVALE): ICD-10-CM

## 2020-12-16 DIAGNOSIS — R29.898 BILATERAL LEG WEAKNESS: ICD-10-CM

## 2020-12-16 DIAGNOSIS — I65.23 BILATERAL CAROTID ARTERY STENOSIS: ICD-10-CM

## 2020-12-16 DIAGNOSIS — M48.02 CERVICAL SPINAL STENOSIS: ICD-10-CM

## 2020-12-16 DIAGNOSIS — M50.00 DEGENERATION OF CERVICAL INTERVERTEBRAL DISC WITH MYELOPATHY: Primary | ICD-10-CM

## 2020-12-16 PROCEDURE — 99443 PR PHYS/QHP TELEPHONE EVALUATION 21-30 MIN: CPT | Performed by: PSYCHIATRY & NEUROLOGY

## 2020-12-16 NOTE — LETTER
12/16/2020 Patient: Uriel Sarabia YOB: 1970 Date of Visit: 12/16/2020 Nelda Townsend MD 
[address] [facility] [address] [address] [address] [employer] Dear Nelda Townsend MD, Thank you for referring Mr. Eilene Thayer to [address] [facility] for evaluation. My notes for this consultation are attached. Uriel Sarabia is a 45 y.o. male, evaluated via audio-only technology on 12/16/2020 for Follow-up, Extremity Weakness, and Stroke Ronold Kanner Assessment & Plan:  
 
Diagnoses and all orders for this visit: 1. Degeneration of cervical intervertebral disc with myelopathy 
-     REFERRAL TO NEUROSURGERY 2. Cervical spinal stenosis -     REFERRAL TO NEUROSURGERY 3. Bilateral carotid artery stenosis -     REFERRAL TO NEUROSURGERY 4. Ataxia 
-     REFERRAL TO NEUROSURGERY 5. Bilateral leg weakness 
-     REFERRAL TO NEUROSURGERY 6. PFO (patent foramen ovale) 
-     REFERRAL TO NEUROSURGERY Patient is seen in virtual phone visit to follow-up on his progress because of his previous history of stroke and his history of severe cervical stenosis, for which she was supposed to contact his neurosurgeon Dr. Lary Ravi, and he still has a number in his pocket to call him, which we told him last summer early, and he was supposed to see Dr. Lary Ravi in July. He has been cleared by the cardiologist recently also he says. But for some reason is not made to call. Recall that number he has in his pocket, and to get the referral will put in again to Dr. Andreina Copeland because he had myelomalacia and severe cord compression. Overall he is done fairly well, not had any new weakness, some mild neck pain, but no progression of his myelopathy likely so far. He had a minuscule stroke probably from small vessel disease, but his study was otherwise negative. He is to continue his statin and antiplatelet therapy at this time. 23 minutes with the patient, 13 of the minutes explained to the patient the severity of his disease and his need to follow-up with neurosurgery and counseling and he says he finally agrees. Patient runs a very high risk of paraplegia should anything happen to him due to his severe cord compression, and for his stroke he is also counseled to control his blood pressure, do not smoke, control his cholesterol with his statin, control his diet with his PCP, and that there is a 4 main risk factors of stroke, and he is to look for warning signs of stroke as far as be fast, as balance, eyes, face, arms, sensory in time, and seek medical help immediately if he has any problem. These were all gone over with the patient with detail. 23 minutes spent counseling the patient and talking to him about his problems and plans for therapy and evaluation as above. The complexity of medical decision making for this visit is high Follow-up and Dispositions · Return in about 6 months (around 6/16/2021). 12 
Subjective:  
 
 
Prior to Admission medications Medication Sig Start Date End Date Taking? Authorizing Provider  
metoprolol tartrate (LOPRESSOR) 25 mg tablet Take 0.5 Tabs by mouth two (2) times a day. 12/12/20  Yes Fidel Marina MD  
cholecalciferol (VITAMIN D3) (5000 Units /125 mcg) capsule Take 2 Caps by mouth daily.  10/28/20  Yes Fidel Marina MD  
aspirin 81 mg chewable tablet CHEW 1 TABLET BY MOUTH DAILY 9/9/20  Yes Fidel Marina MD  
 ibuprofen (MOTRIN) 800 mg tablet Take  by mouth. Yes Provider, Historical  
folic acid (FOLVITE) 1 mg tablet TAKE 1 TABLET BY MOUTH EVERY DAY 8/8/20  Yes Devante Wells MD  
Vitamin B-1, mononitrate, 100 mg tablet TAKE 1 TAB BY MOUTH DAILY FOR 60 DAYS. 8/8/20  Yes Aubrey Chou MD  
atorvastatin (Lipitor) 40 mg tablet Take 1 Tab by mouth daily. Patient taking differently: Take 40 mg by mouth two (2) times a day. 5/7/20  Yes Aubrey Chou MD  
 
Patient Active Problem List  
Diagnosis Code  Vertigo R42  Alcohol abuse F10.10  
 HTN (hypertension) I10  
 Hypercholesteremia E78.00  Ataxia R27.0  History of TIAs Z86.73  
 Bilateral leg weakness R29.898  Bilateral carotid artery stenosis I65.23  
 Hx-TIA (transient ischemic attack) Z86.73  
 PFO (patent foramen ovale) Q21.1  Cervical spinal stenosis M48.02  
 Degeneration of cervical intervertebral disc with myelopathy M50.00  Injury of foot L98.135A Patient Active Problem List  
 Diagnosis Date Noted  Injury of foot 10/28/2020  Degeneration of cervical intervertebral disc with myelopathy 06/23/2020  
 PFO (patent foramen ovale) 04/25/2020  Cervical spinal stenosis 04/25/2020  Alcohol abuse 04/24/2020  
 HTN (hypertension) 04/24/2020  Hypercholesteremia 04/24/2020  Ataxia 04/24/2020  Bilateral carotid artery stenosis 04/24/2020  
 Hx-TIA (transient ischemic attack) 04/24/2020  
 History of TIAs  Bilateral leg weakness  Vertigo 04/23/2020 Current Outpatient Medications Medication Sig Dispense Refill  metoprolol tartrate (LOPRESSOR) 25 mg tablet Take 0.5 Tabs by mouth two (2) times a day. 60 Tab 1  cholecalciferol (VITAMIN D3) (5000 Units /125 mcg) capsule Take 2 Caps by mouth daily. 60 Cap 2  
 aspirin 81 mg chewable tablet CHEW 1 TABLET BY MOUTH DAILY 30 Tab 2  ibuprofen (MOTRIN) 800 mg tablet Take  by mouth.     
 folic acid (FOLVITE) 1 mg tablet TAKE 1 TABLET BY MOUTH EVERY DAY 30 Tab 1  
  Vitamin B-1, mononitrate, 100 mg tablet TAKE 1 TAB BY MOUTH DAILY FOR 60 DAYS. 30 Tab 1  
 atorvastatin (Lipitor) 40 mg tablet Take 1 Tab by mouth daily. (Patient taking differently: Take 40 mg by mouth two (2) times a day.) 30 Tab 1 No Known Allergies Past Medical History:  
Diagnosis Date  Alcohol abuse  Bilateral leg weakness  Developmental venous anomaly  History of TIAs  Hypercholesterolemia  Hypertension  Stroke ([facility] [address] No past surgical history on file. Family History Problem Relation Age of Onset  Lung Cancer Mother  COPD Mother  Lung Cancer Father  COPD Father Social History Tobacco Use  Smoking status: Former Smoker Quit date: 2000 Years since quittin.2  Smokeless tobacco: Never Used Substance Use Topics  Alcohol use: Not Currently Comment: social drinker ROS No flowsheet data found. Priscilla Gayathri, who was evaluated through a patient-initiated, synchronous (real-time) audio only encounter, and/or her healthcare decision maker, is aware that it is a billable service, with coverage as determined by his insurance carrier. He provided verbal consent to proceed: Yes. He has not had a related appointment within my department in the past 7 days or scheduled within the next 24 hours. Total Time: minutes: 21-30 minutes Jan Yun MD  
 
 
If you have questions, please do not hesitate to call me. I look forward to following your patient along with you. Sincerely, Jan Yun MD

## 2020-12-16 NOTE — PATIENT INSTRUCTIONS
Office Policies 
 
o Phone calls/patient messages: Please allow up to 24 hours for someone in the office to contact you about your call or message. Be mindful your provider may be out of the office or your message may require further review. We encourage you to use [employer] for your messages as this is a faster, more efficient way to communicate with our office 
 
o Medication Refills: 
Prescription medications require up to 48 business hours to process. We encourage you to use [employer] for your refills. For controlled medications: Please allow up to 72 business hours to process. Certain medications may require you to  a written prescription at our office. NO narcotic/controlled medications will be prescribed after 4pm Monday through Friday or on weekends 
 
o Form/Paperwork Completion: We ask that you allow 7-14 business days. You may also download your forms to [employer] to have your doctor print off. Due to COVID-19, please note there may be a longer wait time than usual. Thank you

## 2020-12-17 ENCOUNTER — TELEPHONE (OUTPATIENT)
Dept: FAMILY MEDICINE CLINIC | Age: 50
End: 2020-12-17

## 2020-12-17 NOTE — PROGRESS NOTES
Sherman Pierre is a 45 y.o. male, evaluated via audio-only technology on 12/16/2020 for Follow-up, Extremity Weakness, and Stroke  . Assessment & Plan:     Diagnoses and all orders for this visit:    1. Degeneration of cervical intervertebral disc with myelopathy  -     REFERRAL TO NEUROSURGERY    2. Cervical spinal stenosis  -     REFERRAL TO NEUROSURGERY    3. Bilateral carotid artery stenosis  -     REFERRAL TO NEUROSURGERY    4. Ataxia  -     REFERRAL TO NEUROSURGERY    5. Bilateral leg weakness  -     REFERRAL TO NEUROSURGERY    6. PFO (patent foramen ovale)  -     REFERRAL TO NEUROSURGERY      Patient is seen in virtual phone visit to follow-up on his progress because of his previous history of stroke and his history of severe cervical stenosis, for which she was supposed to contact his neurosurgeon Dr. Colletta Grice, and he still has a number in his pocket to call him, which we told him last summer early, and he was supposed to see Dr. Colletta Grice in July. He has been cleared by the cardiologist recently also he says. But for some reason is not made to call. Recall that number he has in his pocket, and to get the referral will put in again to Dr. Colletta Grice because he had myelomalacia and severe cord compression. Overall he is done fairly well, not had any new weakness, some mild neck pain, but no progression of his myelopathy likely so far. He had a minuscule stroke probably from small vessel disease, but his study was otherwise negative. He is to continue his statin and antiplatelet therapy at this time. 23 minutes with the patient, 13 of the minutes explained to the patient the severity of his disease and his need to follow-up with neurosurgery and counseling and he says he finally agrees.   Patient runs a very high risk of paraplegia should anything happen to him due to his severe cord compression, and for his stroke he is also counseled to control his blood pressure, do not smoke, control his cholesterol with his statin, control his diet with his PCP, and that there is a 4 main risk factors of stroke, and he is to look for warning signs of stroke as far as be fast, as balance, eyes, face, arms, sensory in time, and seek medical help immediately if he has any problem. These were all gone over with the patient with detail. 23 minutes spent counseling the patient and talking to him about his problems and plans for therapy and evaluation as above. The complexity of medical decision making for this visit is high   Follow-up and Dispositions    · Return in about 6 months (around 6/16/2021). 12  Subjective:       Prior to Admission medications    Medication Sig Start Date End Date Taking? Authorizing Provider   metoprolol tartrate (LOPRESSOR) 25 mg tablet Take 0.5 Tabs by mouth two (2) times a day. 12/12/20  Yes Hayden Pollard MD   cholecalciferol (VITAMIN D3) (5000 Units /125 mcg) capsule Take 2 Caps by mouth daily. 10/28/20  Yes Hayden Pollard MD   aspirin 81 mg chewable tablet CHEW 1 TABLET BY MOUTH DAILY 9/9/20  Yes Devante Wells MD   ibuprofen (MOTRIN) 800 mg tablet Take  by mouth. Yes Provider, Historical   folic acid (FOLVITE) 1 mg tablet TAKE 1 TABLET BY MOUTH EVERY DAY 8/8/20  Yes Devante Wells MD   Vitamin B-1, mononitrate, 100 mg tablet TAKE 1 TAB BY MOUTH DAILY FOR 60 DAYS. 8/8/20  Yes Hayden Pollard MD   atorvastatin (Lipitor) 40 mg tablet Take 1 Tab by mouth daily. Patient taking differently: Take 40 mg by mouth two (2) times a day.  5/7/20  Yes Hayden Pollard MD     Patient Active Problem List   Diagnosis Code    Vertigo R42    Alcohol abuse F10.10    HTN (hypertension) I10    Hypercholesteremia E78.00    Ataxia R27.0    History of TIAs Z86.73    Bilateral leg weakness R29.898    Bilateral carotid artery stenosis I65.23    Hx-TIA (transient ischemic attack) Z86.73    PFO (patent foramen ovale) Q21.1    Cervical spinal stenosis M48.02    Degeneration of cervical intervertebral disc with myelopathy M50.00    Injury of foot S99.929A     Patient Active Problem List    Diagnosis Date Noted    Injury of foot 10/28/2020    Degeneration of cervical intervertebral disc with myelopathy 2020    PFO (patent foramen ovale) 2020    Cervical spinal stenosis 2020    Alcohol abuse 2020    HTN (hypertension) 2020    Hypercholesteremia 2020    Ataxia 2020    Bilateral carotid artery stenosis 2020    Hx-TIA (transient ischemic attack) 2020    History of TIAs     Bilateral leg weakness     Vertigo 2020     Current Outpatient Medications   Medication Sig Dispense Refill    metoprolol tartrate (LOPRESSOR) 25 mg tablet Take 0.5 Tabs by mouth two (2) times a day. 60 Tab 1    cholecalciferol (VITAMIN D3) (5000 Units /125 mcg) capsule Take 2 Caps by mouth daily. 60 Cap 2    aspirin 81 mg chewable tablet CHEW 1 TABLET BY MOUTH DAILY 30 Tab 2    ibuprofen (MOTRIN) 800 mg tablet Take  by mouth.  folic acid (FOLVITE) 1 mg tablet TAKE 1 TABLET BY MOUTH EVERY DAY 30 Tab 1    Vitamin B-1, mononitrate, 100 mg tablet TAKE 1 TAB BY MOUTH DAILY FOR 60 DAYS. 30 Tab 1    atorvastatin (Lipitor) 40 mg tablet Take 1 Tab by mouth daily. (Patient taking differently: Take 40 mg by mouth two (2) times a day.) 30 Tab 1     No Known Allergies  Past Medical History:   Diagnosis Date    Alcohol abuse     Bilateral leg weakness     Developmental venous anomaly     History of TIAs     Hypercholesterolemia     Hypertension     Stroke ([facility] [address]      No past surgical history on file.   Family History   Problem Relation Age of Onset    Lung Cancer Mother     COPD Mother     Lung Cancer Father     COPD Father      Social History     Tobacco Use    Smoking status: Former Smoker     Quit date: 2000     Years since quittin.2    Smokeless tobacco: Never Used   Substance Use Topics    Alcohol use: Not Currently     Comment: social drinker ROS    No flowsheet data found. Moris Silva, who was evaluated through a patient-initiated, synchronous (real-time) audio only encounter, and/or her healthcare decision maker, is aware that it is a billable service, with coverage as determined by his insurance carrier. He provided verbal consent to proceed: Yes. He has not had a related appointment within my department in the past 7 days or scheduled within the next 24 hours.       Total Time: minutes: 21-30 minutes    Rocky Magallon MD

## 2020-12-17 NOTE — TELEPHONE ENCOUNTER
[facility] Clarification on metoprolo 25mg. Patient have been breaking the medication in half and taking  0.5 tab by mouth two times a day.  Please advise

## 2021-01-12 RX ORDER — GUAIFENESIN 100 MG/5ML
LIQUID (ML) ORAL
Qty: 30 TAB | Refills: 2 | Status: SHIPPED | OUTPATIENT
Start: 2021-01-12 | End: 2021-05-04

## 2021-03-08 ENCOUNTER — OFFICE VISIT (OUTPATIENT)
Dept: FAMILY MEDICINE CLINIC | Age: 51
End: 2021-03-08
Payer: MEDICAID

## 2021-03-08 VITALS
BODY MASS INDEX: 29.82 KG/M2 | RESPIRATION RATE: 20 BRPM | WEIGHT: 213 LBS | DIASTOLIC BLOOD PRESSURE: 80 MMHG | HEIGHT: 71 IN | SYSTOLIC BLOOD PRESSURE: 126 MMHG | TEMPERATURE: 97.7 F | HEART RATE: 69 BPM | OXYGEN SATURATION: 99 %

## 2021-03-08 DIAGNOSIS — R73.03 BORDERLINE DIABETES MELLITUS: ICD-10-CM

## 2021-03-08 DIAGNOSIS — R35.0 FREQUENCY OF URINATION: ICD-10-CM

## 2021-03-08 DIAGNOSIS — I10 HYPERTENSION, WELL CONTROLLED: Primary | ICD-10-CM

## 2021-03-08 DIAGNOSIS — E78.5 DYSLIPIDEMIA: ICD-10-CM

## 2021-03-08 DIAGNOSIS — E55.9 VITAMIN D DEFICIENCY: ICD-10-CM

## 2021-03-08 DIAGNOSIS — Z11.59 NEED FOR HEPATITIS C SCREENING TEST: ICD-10-CM

## 2021-03-08 PROCEDURE — 99214 OFFICE O/P EST MOD 30 MIN: CPT | Performed by: INTERNAL MEDICINE

## 2021-03-08 RX ORDER — LOSARTAN POTASSIUM 25 MG/1
25 TABLET ORAL DAILY
COMMUNITY

## 2021-03-08 RX ORDER — ATORVASTATIN CALCIUM 80 MG/1
TABLET, FILM COATED ORAL
COMMUNITY
Start: 2020-12-16

## 2021-03-08 NOTE — PATIENT INSTRUCTIONS

## 2021-03-08 NOTE — PROGRESS NOTES
Chief Complaint   Patient presents with    Follow-up     HPI:  Kyler Royal is a 45 y.o. AA male with hypertension, hypercholesterolemia, CVA presents for follow-up  Patient has been doing well. Blood pressure is at goal. He has no complaints. Review of Systems  As per hpi    Past Medical History:   Diagnosis Date    Alcohol abuse     Bilateral leg weakness     Developmental venous anomaly     History of TIAs     Hypercholesterolemia     Hypertension     Stroke [facility])      History reviewed. No pertinent surgical history. Social History     Socioeconomic History    Marital status: SINGLE     Spouse name: Not on file    Number of children: Not on file    Years of education: Not on file    Highest education level: Not on file   Tobacco Use    Smoking status: Former Smoker     Quit date: 2000     Years since quittin.4    Smokeless tobacco: Never Used   Substance and Sexual Activity    Alcohol use: Not Currently     Comment: social drinker    Drug use: No     Family History   Problem Relation Age of Onset    Lung Cancer Mother     COPD Mother     Lung Cancer Father     COPD Father      Current Outpatient Medications   Medication Sig Dispense Refill    losartan (COZAAR) 25 mg tablet Take 25 mg by mouth daily.  aspirin 81 mg chewable tablet CHEW 1 TABLET BY MOUTH DAILY 30 Tab 2    metoprolol tartrate (LOPRESSOR) 25 mg tablet Take 0.5 Tabs by mouth two (2) times a day. 60 Tab 1    cholecalciferol (VITAMIN D3) (5000 Units /125 mcg) capsule Take 2 Caps by mouth daily. 60 Cap 2    ibuprofen (MOTRIN) 800 mg tablet Take  by mouth.  folic acid (FOLVITE) 1 mg tablet TAKE 1 TABLET BY MOUTH EVERY DAY 30 Tab 1    Vitamin B-1, mononitrate, 100 mg tablet TAKE 1 TAB BY MOUTH DAILY FOR 60 DAYS.  30 Tab 1    atorvastatin (LIPITOR) 80 mg tablet        No Known Allergies    Objective:  Visit Vitals  /80   Pulse 69   Temp 97.7 °F (36.5 °C) (Temporal)   Resp 20   Ht 5' 11\" (1.803 m)   Wt 213 lb (96.6 kg)   SpO2 99%   BMI 29.71 kg/m²     Physical Exam:   General appearance - alert, well appearing in no distress  Mental status - alert, oriented to person, place, and time  Neck - supple, no significant adenopathy   Chest - clear to auscultation, no wheezes, rales or rhonchi  Heart - normal rate, regular rhythm, no murmurs  Abdomen - soft, nontender, nondistended, no organomegaly  Ext-peripheral pulses normal, no pedal edema  Neuro - no focal findings  Back-full range of motion, no tenderness, palpable spasm or pain on motion     Results for orders placed or performed in visit on 10/28/20   HEMOGLOBIN A1C WITH EAG   Result Value Ref Range    Hemoglobin A1c 6.3 (H) 4.8 - 5.6 %    Estimated average glucose 134 mg/dL   VITAMIN D, 25 HYDROXY   Result Value Ref Range    VITAMIN D, 25-HYDROXY 17.2 (L) 30.0 - 100.0 ng/mL   LIPID PANEL   Result Value Ref Range    Cholesterol, total 198 100 - 199 mg/dL    Triglyceride 70 0 - 149 mg/dL    HDL Cholesterol 57 >39 mg/dL    VLDL, calculated 13 5 - 40 mg/dL    LDL, calculated 128 (H) 0 - 99 mg/dL     Assessment/Plan:  Diagnoses and all orders for this visit:    Hypertension, well controlled    Vitamin D deficiency  -     VITAMIN D, 25 HYDROXY; Future    Frequency of urination  -     URINALYSIS W/ RFLX MICROSCOPIC; Future    Borderline diabetes mellitus  -     HEMOGLOBIN A1C WITH EAG; Future    Need for hepatitis C screening test  -     HEPATITIS C AB; Future    Dyslipidemia  -     LIPID PANEL; Future      Patient Instructions          High Cholesterol: Care Instructions  Your Care Instructions     Cholesterol is a type of fat in your blood. It is needed for many body functions, such as making new cells. Cholesterol is made by your body. It also comes from food you eat. High cholesterol means that you have too much of the fat in your blood. This raises your risk of a heart attack and stroke. LDL and HDL are part of your total cholesterol. LDL is the \"bad\" cholesterol. High LDL can raise your risk for heart disease, heart attack, and stroke. HDL is the \"good\" cholesterol. It helps clear bad cholesterol from the body. High HDL is linked with a lower risk of heart disease, heart attack, and stroke. Your cholesterol levels help your doctor find out your risk for having a heart attack or stroke. You and your doctor can talk about whether you need to lower your risk and what treatment is best for you. A heart-healthy lifestyle along with medicines can help lower your cholesterol and your risk. The way you choose to lower your risk will depend on how high your risk is for heart attack and stroke. It will also depend on how you feel about taking medicines. Follow-up care is a key part of your treatment and safety. Be sure to make and go to all appointments, and call your doctor if you are having problems. It's also a good idea to know your test results and keep a list of the medicines you take. How can you care for yourself at home? · Eat a variety of foods every day. Good choices include fruits, vegetables, whole grains (like oatmeal), dried beans and peas, nuts and seeds, soy products (like tofu), and fat-free or low-fat dairy products. · Replace butter, margarine, and hydrogenated or partially hydrogenated oils with olive and canola oils. (Canola oil margarine without trans fat is fine.)  · Replace red meat with fish, poultry, and soy protein (like tofu). · Limit processed and packaged foods like chips, crackers, and cookies. · Bake, broil, or steam foods. Don't valdes them. · Be physically active. Get at least 30 minutes of exercise on most days of the week. Walking is a good choice. You also may want to do other activities, such as running, swimming, cycling, or playing tennis or team sports. · Stay at a healthy weight or lose weight by making the changes in eating and physical activity listed above.  Losing just a small amount of weight, even 5 to 10 pounds, can reduce your risk for having a heart attack or stroke. · Do not smoke. When should you call for help? Watch closely for changes in your health, and be sure to contact your doctor if:    · You need help making lifestyle changes.     · You have questions about your medicine. Where can you learn more? Go to http://www.gray.com/  Enter H645 in the search box to learn more about \"High Cholesterol: Care Instructions. \"  Current as of: December 16, 2019               Content Version: 12.6  © 1104-8112 [employer]. Care instructions adapted under license by [employer] (which disclaims liability or warranty for this information). If you have questions about a medical condition or this instruction, always ask your healthcare professional. [address] any warranty or liability for your use of this information. Follow-up and Dispositions    · Return in about 3 months (around 6/8/2021), or if symptoms worsen or fail to improve, for routine follow up.

## 2021-03-09 LAB
25(OH)D3+25(OH)D2 SERPL-MCNC: 91.9 NG/ML (ref 30–100)
APPEARANCE UR: CLEAR
BILIRUB UR QL STRIP: NEGATIVE
CHOLEST SERPL-MCNC: 135 MG/DL (ref 100–199)
COLOR UR: YELLOW
EST. AVERAGE GLUCOSE BLD GHB EST-MCNC: 126 MG/DL
GLUCOSE UR QL: NEGATIVE
HBA1C MFR BLD: 6 % (ref 4.8–5.6)
HCV AB S/CO SERPL IA: <0.1 S/CO RATIO (ref 0–0.9)
HDLC SERPL-MCNC: 53 MG/DL
HGB UR QL STRIP: NEGATIVE
KETONES UR QL STRIP: NEGATIVE
LDLC SERPL CALC-MCNC: 72 MG/DL (ref 0–99)
LEUKOCYTE ESTERASE UR QL STRIP: NEGATIVE
MICRO URNS: NORMAL
NITRITE UR QL STRIP: NEGATIVE
PH UR STRIP: 5.5 [PH] (ref 5–7.5)
PROT UR QL STRIP: NEGATIVE
SP GR UR: 1.02 (ref 1–1.03)
TRIGL SERPL-MCNC: 40 MG/DL (ref 0–149)
UROBILINOGEN UR STRIP-MCNC: 0.2 MG/DL (ref 0.2–1)
VLDLC SERPL CALC-MCNC: 10 MG/DL (ref 5–40)

## 2021-03-10 ENCOUNTER — TELEPHONE (OUTPATIENT)
Dept: NEUROLOGY | Age: 51
End: 2021-03-10

## 2021-03-10 DIAGNOSIS — M48.02 CERVICAL SPINAL STENOSIS: ICD-10-CM

## 2021-03-10 DIAGNOSIS — M50.00 DEGENERATION OF CERVICAL INTERVERTEBRAL DISC WITH MYELOPATHY: Primary | ICD-10-CM

## 2021-05-04 RX ORDER — GUAIFENESIN 100 MG/5ML
LIQUID (ML) ORAL
Qty: 30 TAB | Refills: 2 | Status: SHIPPED | OUTPATIENT
Start: 2021-05-04

## 2022-03-18 PROBLEM — I10 HTN (HYPERTENSION): Status: ACTIVE | Noted: 2020-04-24

## 2022-03-18 PROBLEM — Q21.12 PFO (PATENT FORAMEN OVALE): Status: ACTIVE | Noted: 2020-04-25

## 2022-03-19 PROBLEM — E78.00 HYPERCHOLESTEREMIA: Status: ACTIVE | Noted: 2020-04-24

## 2022-03-19 PROBLEM — R27.0 ATAXIA: Status: ACTIVE | Noted: 2020-04-24

## 2022-03-19 PROBLEM — I65.23 BILATERAL CAROTID ARTERY STENOSIS: Status: ACTIVE | Noted: 2020-04-24

## 2022-03-19 PROBLEM — S99.929A INJURY OF FOOT: Status: ACTIVE | Noted: 2020-10-28

## 2022-03-19 PROBLEM — F10.10 ALCOHOL ABUSE: Status: ACTIVE | Noted: 2020-04-24

## 2022-03-19 PROBLEM — M48.02 CERVICAL SPINAL STENOSIS: Status: ACTIVE | Noted: 2020-04-25

## 2022-03-20 PROBLEM — M50.00 DEGENERATION OF CERVICAL INTERVERTEBRAL DISC WITH MYELOPATHY: Status: ACTIVE | Noted: 2020-06-23

## 2022-03-20 PROBLEM — Z86.73 HX-TIA (TRANSIENT ISCHEMIC ATTACK): Status: ACTIVE | Noted: 2020-04-24

## 2022-03-20 PROBLEM — R42 VERTIGO: Status: ACTIVE | Noted: 2020-04-23

## 2023-05-10 RX ORDER — LOSARTAN POTASSIUM 25 MG/1
25 TABLET ORAL DAILY
COMMUNITY

## 2023-05-10 RX ORDER — FOLIC ACID 1 MG/1
1 TABLET ORAL DAILY
COMMUNITY
Start: 2020-08-08

## 2023-05-10 RX ORDER — ATORVASTATIN CALCIUM 80 MG/1
TABLET, FILM COATED ORAL
COMMUNITY
Start: 2020-12-16

## 2023-05-10 RX ORDER — ASPIRIN 81 MG/1
TABLET, CHEWABLE ORAL
COMMUNITY
Start: 2021-05-04

## 2023-05-10 RX ORDER — LANOLIN ALCOHOL/MO/W.PET/CERES
CREAM (GRAM) TOPICAL
COMMUNITY
Start: 2020-08-08

## 2023-07-06 NOTE — LETTER
6/23/20 Patient: Alhaji Marmolejo YOB: 1970 Date of Visit: 6/23/2020 Sam Kimble MD 
[address] [address] VIA In Basket Dear Sam Kimble MD, Thank you for referring Mr. Víctor Hull to [facility] for evaluation. My notes for this consultation are attached. Consult REFERRED BY: 
Obdulia Goodwin MD 
 
CHIEF COMPLAINT: Bilateral leg weakness Subjective:  
 
Alhaji Marmolejo is a 45 y.o. right-handed male seen for evaluation of new problem bilateral leg weakness and unsteadiness that has been present for the last 2 to 3 days, and he was admitted to the hospital had a normal CT of the head and CTA of the head on admission and had an MRI scan that showed a tiny lacunar infarct in the left corpus callosum for which she was given aspirin and statin, but more significantly was found to have a severe spinal cord compression at C4-5 with myelomalacia and a noted cord compression at C6-7 that was also significant, and neurosurgery has seen him and has set him up for follow-up in 3 months time from the stroke to consider surgery in view of the stroke itself. Patient still remains myelopathic with mild weakness in the left arm and left leg and spastic slightly clumsy gait left side worse than the right. He has better than he was in the hospital however. He did finish his course of therapy. He is trying to avoid alcohol at this time and is homeless and has been housed by [facility] [employer]. Puneet Freitas He denies any weakness in his arms, but does have a history of drinking somewhat heavily at times, said he stopped 2 days ago but still had a blood alcohol level of 208. Patient did have a history of a TIA 1 year ago in January 2019, that was thought to be due to dehydration, alcohol, and not eating properly.   Patient denies any chest pain or palpitations, denies any fever or meningismus, denies any head Medications:  Try to minimize any use of aleve: do not take it every day.  Try tylenol first.    Stay hydrated. Drinking 48-64 oz water per day.    Change your depends every time they are wet. Change them more than once per day.  When you change them, take a body wipe or wet washrag and wipe front to back before putting on the new depends.    Aquafor to sore area lower back, top of gluteal crease.  Home health nurse to check sore skin area gluteal crease.    Diabetes: the hemoglobin A1c is 8.0 which is elevated.  Take Januvia 50mg every am.  Change cranberry and tea drinks to sugar free for now.  Decrease corn bread at breakfast to a few times per week, try a different bread on the otherdays that is less sweet.    Continue iron three times per week.       trauma, and denies any major neck pain, denies any other cause of his symptoms. Patient was supposed to be on a baby aspirin but has not been taking his medications. Patient still has some mild neck pain, but no other new weakness or other new focal neurologic deficits. His MRI scans reviewed personally on the PACS system with the patient, and I agree with the diagnosis of a tiny lacunar infarct as probably not even symptomatic, and his severe cord compression. He has no history of atrial fibrillation Past Medical History:  
Diagnosis Date  Alcohol abuse  Bilateral leg weakness  Developmental venous anomaly  History of TIAs History reviewed. No pertinent surgical history. Family History Problem Relation Age of Onset  Lung Cancer Mother  COPD Mother  Lung Cancer Father  COPD Father Social History Tobacco Use  Smoking status: Never Smoker  Smokeless tobacco: Never Used Substance Use Topics  Alcohol use: Yes Comment: social drinker Current Outpatient Medications:  
  atorvastatin (Lipitor) 40 mg tablet, Take 1 Tab by mouth daily. , Disp: 30 Tab, Rfl: 1 
  metoprolol tartrate (LOPRESSOR) 25 mg tablet, Take 0.5 Tabs by mouth two (2) times a day., Disp: 60 Tab, Rfl: 1 
  folic acid (FOLVITE) 1 mg tablet, Take 1 Tab by mouth daily for 60 days. , Disp: 30 Tab, Rfl: 1 
  thiamine mononitrate (B-1) 100 mg tablet, Take 1 Tab by mouth daily for 60 days. , Disp: 30 Tab, Rfl: 1 
  aspirin 81 mg chewable tablet, Take 1 Tab by mouth daily for 90 days. , Disp: 30 Tab, Rfl: 2   cholecalciferol (VITAMIN D3) (5000 Units /125 mcg) capsule, Take 2 caps by mouth daily for 3 months, Disp: 60 Cap, Rfl: 4 No Known Allergies MRI Results (most recent): 
Results from [facility] [place] Encounter encounter on 04/23/20 MRI CERV SPINE WO CONT Narrative EXAM: MRI CERV SPINE WO CONT INDICATION: Bilateral lower extremity weakness. Clinical differential diagnosis includes myelopathy. Hospitalization for workup of vertigo, slurred speech, and 
ataxia. COMPARISON: None TECHNIQUE: MR imaging of the cervical spine was performed using the following 
sequences: sagittal T1, T2, STIR;  axial T2, T1 performed on the 3 Chasity magnet. CONTRAST:  None. FINDINGS: Congenitally slender cervical spinal canal measures 8 mm in diameter 
at C4. Cervical kyphosis is mild and due to patient positioning or muscle spasm. No 
anterior or posterior subluxation. Vertebral body heights are maintained. Degenerative bone marrow edema is mild within C4-C7 vertebral bodies. No marrow 
replacing process. Multilevel facet arthrosis is greatest at C4-C5. Disc 
desiccation is moderate in greatest at C6-C7. No discitis. The craniocervical junction is intact. Hyperintense T2 signal in the central 
spinal cord is predominantly linear and C3-C5. There is heterogeneous horizontal 
signal in the spinal cord at 4-C5. Mild cord compression at C4-C5. No cord 
expansion or syrinx. The paraspinal soft tissues are within normal limits. C2-C3: No herniation or stenosis. C3-C4: Posterior disc osteophyte complex. Mild central spinal canal stenosis. C4-C5: Posterior disc osteophyte complex. Severe central spinal canal stenosis. Severe right and mild left foraminal stenosis. C5-C6: Posterior disc osteophyte complex. Moderate right foraminal stenosis. C6-C7: Posterior disc osteophyte complex. Moderate central spinal canal 
stenosis. Moderate bilateral foraminal stenosis. C7-T1: No herniation or stenosis. Impression IMPRESSION: 
 
1. C4-C5 severe central spinal canal stenosis, cord compression, and cord 
myelomalacia more likely than cord contusion. 2. C6-C7 moderate central spinal canal stenosis. 3. Degenerative disease is superimposed on a congenitally slender cervical 
spinal canal. Stenoses are detailed above. [place] [facility] [place]23/20 MRI CERV SPINE WO CONT Narrative EXAM: MRI CERV SPINE WO CONT INDICATION: Bilateral lower extremity weakness. Clinical differential diagnosis 
includes myelopathy. Hospitalization for workup of vertigo, slurred speech, and 
ataxia. COMPARISON: None TECHNIQUE: MR imaging of the cervical spine was performed using the following 
sequences: sagittal T1, T2, STIR;  axial T2, T1 performed on the 3 Chasity magnet. CONTRAST:  None. FINDINGS: Congenitally slender cervical spinal canal measures 8 mm in diameter 
at C4. Cervical kyphosis is mild and due to patient positioning or muscle spasm. No 
anterior or posterior subluxation. Vertebral body heights are maintained. Degenerative bone marrow edema is mild within C4-C7 vertebral bodies. No marrow 
replacing process. Multilevel facet arthrosis is greatest at C4-C5. Disc 
desiccation is moderate in greatest at C6-C7. No discitis. The craniocervical junction is intact. Hyperintense T2 signal in the central 
spinal cord is predominantly linear and C3-C5. There is heterogeneous horizontal 
signal in the spinal cord at 4-C5. Mild cord compression at C4-C5. No cord 
expansion or syrinx. The paraspinal soft tissues are within normal limits. C2-C3: No herniation or stenosis. C3-C4: Posterior disc osteophyte complex. Mild central spinal canal stenosis. C4-C5: Posterior disc osteophyte complex. Severe central spinal canal stenosis. Severe right and mild left foraminal stenosis. C5-C6: Posterior disc osteophyte complex. Moderate right foraminal stenosis. C6-C7: Posterior disc osteophyte complex. Moderate central spinal canal 
stenosis. Moderate bilateral foraminal stenosis. C7-T1: No herniation or stenosis. Impression IMPRESSION: 
 
1. C4-C5 severe central spinal canal stenosis, cord compression, and cord 
myelomalacia more likely than cord contusion. 2. C6-C7 moderate central spinal canal stenosis. 3. Degenerative disease is superimposed on a congenitally slender cervical 
spinal canal. Stenoses are detailed above. Review of Systems: A comprehensive review of systems was negative except for: Neurological: positive for paresthesia, coordination problems, gait problems and weakness Behvioral/Psych: positive for anxiety and depression Vitals:  
 06/23/20 1205 BP: 128/82 Pulse: 60 SpO2: 99% Weight: 200 lb (90.7 kg) Height: 5' 11\" (1.803 m) Objective: I 
 
 
NEUROLOGICAL EXAM: 
 
Appearance: The patient is fairly developed and nourished, provides a fair history and is in no acute distress. Mental Status: Oriented to time, place and person, and the president, cognitive function is normal and speech is fluent and no aphasia or dysarthria. Mood and affect appropriate. Cranial Nerves:   Intact visual fields. Fundi are benign, disc are flat, no lesions seen on funduscopy. MUSHTAQ, EOM's full, no nystagmus, no ptosis. Facial sensation is normal. Corneal reflexes are not tested. Facial movement is symmetric. Hearing is normal bilaterally. Palate is midline with normal sternocleidomastoid and trapezius muscles are normal. Tongue is midline. Neck without meningismus or bruits Temporal arteries are not tender or enlarged TMJ areas are not tender on palpation Motor:  4/5 strength in upper and lower proximal and distal muscles, with the left side being slightly weaker than the right. Normal bulk and tone. No fasciculations. Rapid alternating movement is symmetric and slow on the left Reflexes:   Deep tendon reflexes 2+/4 and symmetrical in the upper extremity, and 3+ knee jerks bilaterally, and 1+ ankle jerks bilaterally. No babinski or clonus present Sensory:   Abnormal to touch, pinprick and vibration and temperature in his feet. DSS is intact Gait:  Abnormal gait with patient mildly weak and unsteady with wide-based gait. Tremor:   No tremor noted. Cerebellar:   Mildly abnormal cerebellar signs present on Romberg and tandem testing and finger-nose-finger exam.  
Neurovascular:  Normal heart sounds and regular rhythm, peripheral pulses decreased, and no carotid bruits. Assessment:  
 
{No Diagnosis Found} Active Problems: * No active hospital problems. * 
 
 
Plan:  
 
Patient with persistent and symptomatic cervical myelopathy, with mild left hemiparesis and unsteady myelopathic gait and needs decompression after his 3-month interval.  Patient stroke was April 24 of any time after that July 24 he can have surgery. Patient is to call Dr. Hooker Holding office for follow-up visit time, and surgery to be scheduled for his symptomatic myelopathy He is on vitamin supplements and not drinking supposedly. He may have a component of mild cerebellar ataxia secondary to his drinking. We will see the patient again in 6 months time or earlier as needed. Discussed this condition with the patient 25 minutes today and over his exam, and why he needs surgery, he is advised to be extremely careful to avoid any neck trauma in the interim. We will follow carefully with you. Continue vitamin supplementation and exercise program 
Will call if any change. Signed By: Janelle Verduzco MD   
 June 23, 2020 CC: Vianca Ziegler MD 
FAX: [phone] If you have questions, please do not hesitate to call me. I look forward to following your patient along with you. Sincerely, Janelle Verduzco MD

## 2024-11-07 ENCOUNTER — OFFICE VISIT (OUTPATIENT)
Age: 54
End: 2024-11-07
Payer: COMMERCIAL

## 2024-11-07 VITALS
RESPIRATION RATE: 18 BRPM | BODY MASS INDEX: 31.33 KG/M2 | TEMPERATURE: 96.4 F | OXYGEN SATURATION: 96 % | HEIGHT: 71 IN | SYSTOLIC BLOOD PRESSURE: 150 MMHG | WEIGHT: 223.8 LBS | DIASTOLIC BLOOD PRESSURE: 101 MMHG | HEART RATE: 89 BPM

## 2024-11-07 DIAGNOSIS — R73.03 BORDERLINE DIABETES MELLITUS: ICD-10-CM

## 2024-11-07 DIAGNOSIS — E03.9 HYPOTHYROIDISM, UNSPECIFIED TYPE: ICD-10-CM

## 2024-11-07 DIAGNOSIS — N40.0 BPH WITHOUT OBSTRUCTION/LOWER URINARY TRACT SYMPTOMS: ICD-10-CM

## 2024-11-07 DIAGNOSIS — M16.12 ARTHRITIS OF LEFT HIP: ICD-10-CM

## 2024-11-07 DIAGNOSIS — E55.9 VITAMIN D DEFICIENCY: ICD-10-CM

## 2024-11-07 DIAGNOSIS — Z11.4 ENCOUNTER FOR SCREENING FOR HUMAN IMMUNODEFICIENCY VIRUS (HIV): ICD-10-CM

## 2024-11-07 DIAGNOSIS — R35.0 FREQUENCY OF URINATION: ICD-10-CM

## 2024-11-07 DIAGNOSIS — M54.50 CHRONIC LOW BACK PAIN WITHOUT SCIATICA, UNSPECIFIED BACK PAIN LATERALITY: ICD-10-CM

## 2024-11-07 DIAGNOSIS — E78.00 HYPERCHOLESTEREMIA: ICD-10-CM

## 2024-11-07 DIAGNOSIS — Z11.59 NEED FOR HEPATITIS C SCREENING TEST: ICD-10-CM

## 2024-11-07 DIAGNOSIS — I10 HYPERTENSION GOAL BP (BLOOD PRESSURE) < 130/80: ICD-10-CM

## 2024-11-07 DIAGNOSIS — Z00.00 ENCOUNTER FOR MEDICAL EXAMINATION TO ESTABLISH CARE: Primary | ICD-10-CM

## 2024-11-07 DIAGNOSIS — G89.29 CHRONIC LOW BACK PAIN WITHOUT SCIATICA, UNSPECIFIED BACK PAIN LATERALITY: ICD-10-CM

## 2024-11-07 PROCEDURE — 99205 OFFICE O/P NEW HI 60 MIN: CPT | Performed by: INTERNAL MEDICINE

## 2024-11-07 RX ORDER — DICLOFENAC SODIUM 75 MG/1
75 TABLET, DELAYED RELEASE ORAL 2 TIMES DAILY
COMMUNITY
Start: 2024-10-28 | End: 2024-11-07 | Stop reason: SDUPTHER

## 2024-11-07 RX ORDER — METHOCARBAMOL 500 MG/1
500 TABLET, FILM COATED ORAL 3 TIMES DAILY
Qty: 90 TABLET | Refills: 0 | Status: SHIPPED | OUTPATIENT
Start: 2024-11-07

## 2024-11-07 RX ORDER — DICLOFENAC SODIUM 75 MG/1
75 TABLET, DELAYED RELEASE ORAL 2 TIMES DAILY
Qty: 30 TABLET | Refills: 0 | Status: SHIPPED | OUTPATIENT
Start: 2024-11-07 | End: 2024-11-22

## 2024-11-07 RX ORDER — CYCLOBENZAPRINE HCL 10 MG
10 TABLET ORAL 3 TIMES DAILY PRN
COMMUNITY
Start: 2024-10-28

## 2024-11-07 RX ORDER — LOSARTAN POTASSIUM 50 MG/1
50 TABLET ORAL DAILY
Qty: 30 TABLET | Refills: 5 | Status: SHIPPED | OUTPATIENT
Start: 2024-11-07

## 2024-11-07 RX ORDER — METHOCARBAMOL 500 MG/1
500 TABLET, FILM COATED ORAL
COMMUNITY
Start: 2023-12-03 | End: 2024-11-07 | Stop reason: SDUPTHER

## 2024-11-07 SDOH — ECONOMIC STABILITY: FOOD INSECURITY: WITHIN THE PAST 12 MONTHS, THE FOOD YOU BOUGHT JUST DIDN'T LAST AND YOU DIDN'T HAVE MONEY TO GET MORE.: NEVER TRUE

## 2024-11-07 SDOH — ECONOMIC STABILITY: FOOD INSECURITY: WITHIN THE PAST 12 MONTHS, YOU WORRIED THAT YOUR FOOD WOULD RUN OUT BEFORE YOU GOT MONEY TO BUY MORE.: NEVER TRUE

## 2024-11-07 SDOH — ECONOMIC STABILITY: INCOME INSECURITY: HOW HARD IS IT FOR YOU TO PAY FOR THE VERY BASICS LIKE FOOD, HOUSING, MEDICAL CARE, AND HEATING?: NOT HARD AT ALL

## 2024-11-07 ASSESSMENT — PATIENT HEALTH QUESTIONNAIRE - PHQ9
SUM OF ALL RESPONSES TO PHQ QUESTIONS 1-9: 0
SUM OF ALL RESPONSES TO PHQ QUESTIONS 1-9: 0
2. FEELING DOWN, DEPRESSED OR HOPELESS: NOT AT ALL
SUM OF ALL RESPONSES TO PHQ QUESTIONS 1-9: 0
SUM OF ALL RESPONSES TO PHQ9 QUESTIONS 1 & 2: 0
1. LITTLE INTEREST OR PLEASURE IN DOING THINGS: NOT AT ALL
SUM OF ALL RESPONSES TO PHQ QUESTIONS 1-9: 0

## 2024-11-07 NOTE — PROGRESS NOTES
Chief Complaint   Patient presents with    Back Pain    Hip Pain     HPI:  Camilo Temple is a 54 y.o. male with with hypertension, hypercholesterolemia, CVA presents to  re-establish care. Patient has not been seen since . Patient has been doing.  Blood pressure reading is not at goal. Patient stopped taking losartan. Advised to resume medication.  Patient has c/o back pain and hip pain. This is a chronic issue.    Review of Systems  As per hpi    Past Medical History:   Diagnosis Date    Alcohol abuse     Bilateral leg weakness     Developmental venous anomaly     History of TIAs     Hypercholesterolemia     Hypertension     Stroke (HCC)      No past surgical history on file.  Social History     Socioeconomic History    Marital status: Single     Spouse name: None    Number of children: None    Years of education: None    Highest education level: None   Tobacco Use    Smoking status: Former     Current packs/day: 0.00     Types: Cigarettes     Quit date: 2000     Years since quittin.1    Smokeless tobacco: Never   Vaping Use    Vaping status: Never Used   Substance and Sexual Activity    Alcohol use: Not Currently    Drug use: No     Social Determinants of Health     Financial Resource Strain: Low Risk  (2024)    Overall Financial Resource Strain (CARDIA)     Difficulty of Paying Living Expenses: Not hard at all   Food Insecurity: No Food Insecurity (2024)    Hunger Vital Sign     Worried About Running Out of Food in the Last Year: Never true     Ran Out of Food in the Last Year: Never true   Transportation Needs: Unknown (2024)    PRAPARE - Transportation     Lack of Transportation (Non-Medical): No   Housing Stability: Unknown (2024)    Housing Stability Vital Sign     Homeless in the Last Year: No     Family History   Problem Relation Age of Onset    COPD Mother     Lung Cancer Mother     COPD Father     Lung Cancer Father      Current Outpatient Medications   Medication Sig

## 2024-11-14 DIAGNOSIS — Z00.00 ENCOUNTER FOR MEDICAL EXAMINATION TO ESTABLISH CARE: ICD-10-CM

## 2024-11-14 DIAGNOSIS — N40.0 BPH WITHOUT OBSTRUCTION/LOWER URINARY TRACT SYMPTOMS: ICD-10-CM

## 2024-11-14 DIAGNOSIS — Z11.59 NEED FOR HEPATITIS C SCREENING TEST: ICD-10-CM

## 2024-11-14 DIAGNOSIS — E55.9 VITAMIN D DEFICIENCY: ICD-10-CM

## 2024-11-14 DIAGNOSIS — R73.03 BORDERLINE DIABETES MELLITUS: ICD-10-CM

## 2024-11-14 DIAGNOSIS — E03.9 HYPOTHYROIDISM, UNSPECIFIED TYPE: ICD-10-CM

## 2024-11-14 DIAGNOSIS — I10 HYPERTENSION GOAL BP (BLOOD PRESSURE) < 130/80: ICD-10-CM

## 2024-11-14 DIAGNOSIS — R35.0 FREQUENCY OF URINATION: ICD-10-CM

## 2024-11-14 DIAGNOSIS — E78.00 HYPERCHOLESTEREMIA: ICD-10-CM

## 2024-11-14 DIAGNOSIS — Z11.4 ENCOUNTER FOR SCREENING FOR HUMAN IMMUNODEFICIENCY VIRUS (HIV): ICD-10-CM

## 2024-11-15 LAB
25(OH)D3 SERPL-MCNC: 11.7 NG/ML (ref 30–100)
ALBUMIN SERPL-MCNC: 4.3 G/DL (ref 3.5–5)
ALBUMIN/GLOB SERPL: 1.1 (ref 1.1–2.2)
ALP SERPL-CCNC: 74 U/L (ref 45–117)
ALT SERPL-CCNC: 40 U/L (ref 12–78)
ANION GAP SERPL CALC-SCNC: 5 MMOL/L (ref 2–12)
APPEARANCE UR: CLEAR
AST SERPL-CCNC: 20 U/L (ref 15–37)
BACTERIA URNS QL MICRO: NEGATIVE /HPF
BASOPHILS # BLD: 0 K/UL (ref 0–0.1)
BASOPHILS NFR BLD: 1 % (ref 0–1)
BILIRUB SERPL-MCNC: 0.4 MG/DL (ref 0.2–1)
BILIRUB UR QL: NEGATIVE
BUN SERPL-MCNC: 10 MG/DL (ref 6–20)
BUN/CREAT SERPL: 9 (ref 12–20)
CALCIUM SERPL-MCNC: 9.8 MG/DL (ref 8.5–10.1)
CHLORIDE SERPL-SCNC: 105 MMOL/L (ref 97–108)
CHOLEST SERPL-MCNC: 276 MG/DL
CO2 SERPL-SCNC: 27 MMOL/L (ref 21–32)
COLOR UR: NORMAL
CREAT SERPL-MCNC: 1.11 MG/DL (ref 0.7–1.3)
DIFFERENTIAL METHOD BLD: ABNORMAL
EOSINOPHIL # BLD: 0.1 K/UL (ref 0–0.4)
EOSINOPHIL NFR BLD: 3 % (ref 0–7)
EPITH CASTS URNS QL MICRO: NORMAL /LPF
ERYTHROCYTE [DISTWIDTH] IN BLOOD BY AUTOMATED COUNT: 12.4 % (ref 11.5–14.5)
EST. AVERAGE GLUCOSE BLD GHB EST-MCNC: 131 MG/DL
GLOBULIN SER CALC-MCNC: 4 G/DL (ref 2–4)
GLUCOSE SERPL-MCNC: 94 MG/DL (ref 65–100)
GLUCOSE UR STRIP.AUTO-MCNC: NEGATIVE MG/DL
HBA1C MFR BLD: 6.2 % (ref 4–5.6)
HCT VFR BLD AUTO: 50.3 % (ref 36.6–50.3)
HCV AB SER IA-ACNC: 0.17 INDEX
HCV AB SERPL QL IA: NONREACTIVE
HDLC SERPL-MCNC: 61 MG/DL
HDLC SERPL: 4.5 (ref 0–5)
HGB BLD-MCNC: 16.8 G/DL (ref 12.1–17)
HGB UR QL STRIP: NEGATIVE
HIV 1+2 AB+HIV1 P24 AG SERPL QL IA: NONREACTIVE
HIV 1/2 RESULT COMMENT: NORMAL
HYALINE CASTS URNS QL MICRO: NORMAL /LPF (ref 0–5)
IMM GRANULOCYTES # BLD AUTO: 0 K/UL (ref 0–0.04)
IMM GRANULOCYTES NFR BLD AUTO: 0 % (ref 0–0.5)
KETONES UR QL STRIP.AUTO: NEGATIVE MG/DL
LDLC SERPL CALC-MCNC: 200.6 MG/DL (ref 0–100)
LEUKOCYTE ESTERASE UR QL STRIP.AUTO: NEGATIVE
LYMPHOCYTES # BLD: 1.6 K/UL (ref 0.8–3.5)
LYMPHOCYTES NFR BLD: 52 % (ref 12–49)
MCH RBC QN AUTO: 30.1 PG (ref 26–34)
MCHC RBC AUTO-ENTMCNC: 33.4 G/DL (ref 30–36.5)
MCV RBC AUTO: 90 FL (ref 80–99)
MONOCYTES # BLD: 0.3 K/UL (ref 0–1)
MONOCYTES NFR BLD: 9 % (ref 5–13)
NEUTS SEG # BLD: 1.1 K/UL (ref 1.8–8)
NEUTS SEG NFR BLD: 35 % (ref 32–75)
NITRITE UR QL STRIP.AUTO: NEGATIVE
NRBC # BLD: 0 K/UL (ref 0–0.01)
NRBC BLD-RTO: 0 PER 100 WBC
PH UR STRIP: 6 (ref 5–8)
PLATELET # BLD AUTO: 283 K/UL (ref 150–400)
PMV BLD AUTO: 10.5 FL (ref 8.9–12.9)
POTASSIUM SERPL-SCNC: 4.8 MMOL/L (ref 3.5–5.1)
PROT SERPL-MCNC: 8.3 G/DL (ref 6.4–8.2)
PROT UR STRIP-MCNC: NEGATIVE MG/DL
RBC # BLD AUTO: 5.59 M/UL (ref 4.1–5.7)
RBC #/AREA URNS HPF: NORMAL /HPF (ref 0–5)
SODIUM SERPL-SCNC: 137 MMOL/L (ref 136–145)
SP GR UR REFRACTOMETRY: 1.01 (ref 1–1.03)
TRIGL SERPL-MCNC: 72 MG/DL
TSH SERPL DL<=0.05 MIU/L-ACNC: 1.52 UIU/ML (ref 0.36–3.74)
URINE CULTURE IF INDICATED: NORMAL
UROBILINOGEN UR QL STRIP.AUTO: 1 EU/DL (ref 0.2–1)
VLDLC SERPL CALC-MCNC: 14.4 MG/DL
WBC # BLD AUTO: 3.1 K/UL (ref 4.1–11.1)
WBC URNS QL MICRO: NORMAL /HPF (ref 0–4)

## 2024-11-16 LAB
PROSTATE SPECIFIC ANTIGEN FREE: 0.97 NG/ML
PROSTATE SPECIFIC ANTIGEN PERCENT FREE: 9.7 %
PSA SERPL-MCNC: 10 NG/ML (ref 0–4)
REFLEX CRITERIA: ABNORMAL

## 2024-11-18 ENCOUNTER — OFFICE VISIT (OUTPATIENT)
Age: 54
End: 2024-11-18
Payer: COMMERCIAL

## 2024-11-18 VITALS
HEIGHT: 71 IN | RESPIRATION RATE: 20 BRPM | BODY MASS INDEX: 31.22 KG/M2 | HEART RATE: 83 BPM | TEMPERATURE: 97.7 F | OXYGEN SATURATION: 97 % | WEIGHT: 223 LBS | SYSTOLIC BLOOD PRESSURE: 134 MMHG | DIASTOLIC BLOOD PRESSURE: 86 MMHG

## 2024-11-18 DIAGNOSIS — E78.00 HYPERCHOLESTEREMIA: Primary | ICD-10-CM

## 2024-11-18 DIAGNOSIS — R73.03 BORDERLINE DIABETES MELLITUS: ICD-10-CM

## 2024-11-18 DIAGNOSIS — Z12.12 ENCOUNTER FOR COLORECTAL CANCER SCREENING: ICD-10-CM

## 2024-11-18 DIAGNOSIS — R97.20 ELEVATED PSA MEASUREMENT: ICD-10-CM

## 2024-11-18 DIAGNOSIS — E55.9 VITAMIN D DEFICIENCY: ICD-10-CM

## 2024-11-18 DIAGNOSIS — Z12.11 ENCOUNTER FOR COLORECTAL CANCER SCREENING: ICD-10-CM

## 2024-11-18 PROCEDURE — 99214 OFFICE O/P EST MOD 30 MIN: CPT | Performed by: INTERNAL MEDICINE

## 2024-11-18 RX ORDER — ATORVASTATIN CALCIUM 10 MG/1
10 TABLET, FILM COATED ORAL DAILY
Qty: 90 TABLET | Refills: 0 | Status: SHIPPED | OUTPATIENT
Start: 2024-11-18

## 2024-11-18 RX ORDER — ACETAMINOPHEN 160 MG
2000 TABLET,DISINTEGRATING ORAL DAILY
Qty: 90 CAPSULE | Refills: 1 | Status: SHIPPED | OUTPATIENT
Start: 2024-11-18

## 2024-11-18 NOTE — PROGRESS NOTES
Chief Complaint   Patient presents with    Results     HPI:  Camilo Temple is a 54 y.o. male presents to discuss lab results.  Serum vit D level is low. Supplement is called in to pharmacy.  Total cholesterol and LDL are elevated compared to last. Agreed to resume statin therapy.  A1C is at borderline diabetes level, low cab diet is encouraged.  PSA is markedly elevated compared to last.  A referral urologist is placed.  Patient is also due for colorectal can screen.     Review of Systems  As per hpi    Past Medical History:   Diagnosis Date    Alcohol abuse     Bilateral leg weakness     Developmental venous anomaly     History of TIAs     Hypercholesterolemia     Hypertension     Stroke (HCC)      No past surgical history on file.  Social History     Socioeconomic History    Marital status: Single   Tobacco Use    Smoking status: Former     Current packs/day: 0.00     Types: Cigarettes     Quit date: 2000     Years since quittin.1    Smokeless tobacco: Never   Vaping Use    Vaping status: Never Used   Substance and Sexual Activity    Alcohol use: Not Currently    Drug use: No     Social Determinants of Health     Financial Resource Strain: Low Risk  (2024)    Overall Financial Resource Strain (CARDIA)     Difficulty of Paying Living Expenses: Not hard at all   Food Insecurity: No Food Insecurity (2024)    Hunger Vital Sign     Worried About Running Out of Food in the Last Year: Never true     Ran Out of Food in the Last Year: Never true   Transportation Needs: Unknown (2024)    PRAPARE - Transportation     Lack of Transportation (Non-Medical): No   Housing Stability: Unknown (2024)    Housing Stability Vital Sign     Homeless in the Last Year: No     Family History   Problem Relation Age of Onset    COPD Mother     Lung Cancer Mother     COPD Father     Lung Cancer Father      Current Outpatient Medications   Medication Sig Dispense Refill    cyclobenzaprine (FLEXERIL) 10 MG tablet

## 2025-06-19 ENCOUNTER — COMMUNITY OUTREACH (OUTPATIENT)
Age: 55
End: 2025-06-19

## 2025-06-19 NOTE — PROGRESS NOTES
Patient's HM shows they are overdue for Colorectal Screening.   Care Everywhere and  files searched.  HM updated with 5/20/21 colonoscopy.  HM Topic still Over Due at this time.